# Patient Record
Sex: FEMALE | Race: ASIAN | NOT HISPANIC OR LATINO | Employment: UNEMPLOYED | URBAN - METROPOLITAN AREA
[De-identification: names, ages, dates, MRNs, and addresses within clinical notes are randomized per-mention and may not be internally consistent; named-entity substitution may affect disease eponyms.]

---

## 2017-01-06 ENCOUNTER — COMMUNICATION - HEALTHEAST (OUTPATIENT)
Dept: UROLOGY | Facility: CLINIC | Age: 72
End: 2017-01-06

## 2017-01-16 ENCOUNTER — OFFICE VISIT - HEALTHEAST (OUTPATIENT)
Dept: UROLOGY | Facility: CLINIC | Age: 72
End: 2017-01-16

## 2017-01-16 DIAGNOSIS — N20.9 URINARY CALCULUS, UNSPECIFIED: ICD-10-CM

## 2017-01-16 DIAGNOSIS — R34 LOW URINE OUTPUT: ICD-10-CM

## 2017-06-06 ENCOUNTER — OFFICE VISIT (OUTPATIENT)
Dept: FAMILY MEDICINE | Facility: CLINIC | Age: 72
End: 2017-06-06

## 2017-06-06 VITALS
BODY MASS INDEX: 23.33 KG/M2 | WEIGHT: 100.8 LBS | DIASTOLIC BLOOD PRESSURE: 73 MMHG | OXYGEN SATURATION: 100 % | HEART RATE: 56 BPM | SYSTOLIC BLOOD PRESSURE: 147 MMHG | TEMPERATURE: 97.8 F | HEIGHT: 55 IN

## 2017-06-06 DIAGNOSIS — M15.0 PRIMARY OSTEOARTHRITIS INVOLVING MULTIPLE JOINTS: ICD-10-CM

## 2017-06-06 DIAGNOSIS — E44.1 MILD MALNUTRITION (H): ICD-10-CM

## 2017-06-06 DIAGNOSIS — F33.1 MODERATE EPISODE OF RECURRENT MAJOR DEPRESSIVE DISORDER (H): Primary | ICD-10-CM

## 2017-06-06 DIAGNOSIS — E78.00 HYPERCHOLESTEREMIA: ICD-10-CM

## 2017-06-06 DIAGNOSIS — M81.0 OSTEOPOROSIS: ICD-10-CM

## 2017-06-06 RX ORDER — FEEDER CONTAINER WITH PUMP SET
1 EACH MISCELLANEOUS 2 TIMES DAILY
Qty: 60 CAN | Refills: 11 | Status: SHIPPED | OUTPATIENT
Start: 2017-06-06 | End: 2018-06-19

## 2017-06-06 RX ORDER — SIMVASTATIN 20 MG
20 TABLET ORAL DAILY
COMMUNITY
Start: 2017-06-06 | End: 2017-06-06

## 2017-06-06 RX ORDER — ALENDRONATE SODIUM 70 MG/1
70 TABLET ORAL
COMMUNITY
Start: 2017-06-06 | End: 2017-06-06

## 2017-06-06 RX ORDER — NAPROXEN 500 MG/1
500 TABLET ORAL DAILY PRN
Qty: 30 TABLET | Refills: 1 | Status: SHIPPED | OUTPATIENT
Start: 2017-06-06 | End: 2017-07-31

## 2017-06-06 RX ORDER — ACETAMINOPHEN 500 MG
500 TABLET ORAL EVERY 4 HOURS PRN
Qty: 30 TABLET | Refills: 11 | Status: SHIPPED | OUTPATIENT
Start: 2017-06-06 | End: 2018-07-27

## 2017-06-06 RX ORDER — SIMVASTATIN 20 MG
20 TABLET ORAL DAILY
Qty: 30 TABLET | Refills: 3 | Status: SHIPPED | OUTPATIENT
Start: 2017-06-06 | End: 2017-07-31

## 2017-06-06 RX ORDER — ACETAMINOPHEN 500 MG
500 TABLET ORAL EVERY 4 HOURS PRN
COMMUNITY
Start: 2017-06-06 | End: 2017-06-06

## 2017-06-06 RX ORDER — CAPSAICIN 0.025 %
CREAM (GRAM) TOPICAL DAILY
COMMUNITY
Start: 2017-06-06 | End: 2017-06-06

## 2017-06-06 RX ORDER — CITALOPRAM HYDROBROMIDE 20 MG/1
20 TABLET ORAL DAILY
Qty: 30 TABLET | Refills: 6 | Status: SHIPPED | OUTPATIENT
Start: 2017-06-06 | End: 2017-12-19

## 2017-06-06 RX ORDER — CAPSAICIN 0.025 %
CREAM (GRAM) TOPICAL
Qty: 45 G | Refills: 3 | Status: SHIPPED | OUTPATIENT
Start: 2017-06-06 | End: 2018-02-16

## 2017-06-06 RX ORDER — ALENDRONATE SODIUM 70 MG/1
70 TABLET ORAL
Qty: 4 TABLET | Refills: 2 | Status: SHIPPED | OUTPATIENT
Start: 2017-06-06 | End: 2017-08-31

## 2017-06-06 ASSESSMENT — ANXIETY QUESTIONNAIRES
1. FEELING NERVOUS, ANXIOUS, OR ON EDGE: MORE THAN HALF THE DAYS
2. NOT BEING ABLE TO STOP OR CONTROL WORRYING: NEARLY EVERY DAY
IF YOU CHECKED OFF ANY PROBLEMS ON THIS QUESTIONNAIRE, HOW DIFFICULT HAVE THESE PROBLEMS MADE IT FOR YOU TO DO YOUR WORK, TAKE CARE OF THINGS AT HOME, OR GET ALONG WITH OTHER PEOPLE: VERY DIFFICULT
3. WORRYING TOO MUCH ABOUT DIFFERENT THINGS: NEARLY EVERY DAY
7. FEELING AFRAID AS IF SOMETHING AWFUL MIGHT HAPPEN: NOT AT ALL
5. BEING SO RESTLESS THAT IT IS HARD TO SIT STILL: MORE THAN HALF THE DAYS
6. BECOMING EASILY ANNOYED OR IRRITABLE: MORE THAN HALF THE DAYS
GAD7 TOTAL SCORE: 14

## 2017-06-06 ASSESSMENT — PATIENT HEALTH QUESTIONNAIRE - PHQ9: 5. POOR APPETITE OR OVEREATING: MORE THAN HALF THE DAYS

## 2017-06-06 NOTE — PATIENT INSTRUCTIONS
Ask [facility] about delivering your prescriptions to your adult day center.     Restart depression medication - citalopram    Follow up in 3 weeks    Can take naproxen once daily as needed for joint pain

## 2017-06-06 NOTE — MR AVS SNAPSHOT
After Visit Summary   2017    See You    MRN: [id number]           Patient Information     Date Of Birth          1945        Visit Information        Provider Department      2017 9:40 AM Blanco Moreno MD [facility]        Today's Diagnoses     Mild malnutrition (H)    -  1    Hypercholesteremia        Major depressive disorder, recurrent episode, mild (H)        Primary osteoarthritis involving multiple joints        Osteoporosis          Care Instructions    Ask [facility] about delivering your prescriptions to your adult day center.     Restart depression medication - citalopram    Follow up in 3 weeks    Can take naproxen once daily as needed for joint pain          Follow-ups after your visit        Who to contact     Please call your clinic at [phone] to:    Ask questions about your health    Make or cancel appointments    Discuss your medicines    Learn about your test results    Speak to your doctor   If you have compliments or concerns about an experience at your clinic, or if you wish to file a complaint, please contact [facility] Patient Relations at [phone] or email us at [email or web address]         Additional Information About Your Visit        MyChart Information     [employer] is an electronic gateway that provides easy, online access to your medical records. With [employer], you can request a clinic appointment, read your test results, renew a prescription or communicate with your care team.     To sign up for [employer] visit the website at [email or web address]   You will be asked to enter the access code listed below, as well as some personal information. Please follow the directions to create your username and password.     Your access code is: [email or web address]  Expires: 2017 11:06 AM     Your access code will  in 90 days. If you need help or a new code, please contact your [facility] [place] [facility] or call [phone] for assistance.        Care EveryWhere ID     This is your Care EveryWhere ID. This could be used by other organizations to access your [place] medical records  [id number]        Your Vitals Were     Pulse Temperature Height Pulse Oximetry BMI (Body Mass Index)       56 97.8  F (36.6  C) (Oral) 4' 6.5\" (138.4 cm) 100% 23.86 kg/m2        Blood Pressure from Last 3 Encounters:   06/06/17 147/73   07/17/15 125/70   04/21/15 154/73    Weight from Last 3 Encounters:   06/06/17 100 lb 12.8 oz (45.7 kg)   07/17/15 103 lb 9.6 oz (47 kg)   04/21/15 108 lb (49 kg)              Today, you had the following     No orders found for display         Today's Medication Changes          These changes are accurate as of: 6/6/17 11:07 AM.  If you have any questions, ask your nurse or doctor.               Start taking these medicines.        Dose/Directions    acetaminophen 500 MG tablet   Commonly known as:  TYLENOL   Used for:  Primary osteoarthritis involving multiple joints   Started by:  Blanco Moreno MD        Dose:  500 mg   Take 1 tablet (500 mg) by mouth every 4 hours as needed for pain   Quantity:  30 tablet   Refills:  11       ENSURE HIGH PROTEIN   Used for:  Mild malnutrition (H)   Started by:  Blanco Moreno MD        Dose:  1 Can   Take 1 Can by mouth 2 times daily   Quantity:  60 Can   Refills:  11       naproxen 500 MG tablet   Commonly known as:  NAPROSYN   Used for:  Primary osteoarthritis involving multiple joints   Started by:  Blanco Moreno MD        Dose:  500 mg   Take 1 tablet (500 mg) by mouth daily as needed for moderate pain   Quantity:  30 tablet   Refills:  1       simvastatin 20 MG tablet   Commonly known as:  ZOCOR   Used for:  Hypercholesteremia   Started by:  Blanco Moreno MD        Dose:  20 mg   Take 1 tablet (20 mg) by mouth daily   Quantity:  30 tablet   Refills:  3         These medicines have changed or have " updated prescriptions.        Dose/Directions    capsaicin 0.025 % Crea cream   Commonly known as:  ZOSTRIX   This may have changed:    - how to take this  - when to take this  - additional instructions   Used for:  Primary osteoarthritis involving multiple joints   Changed by:  Blanco Moreno MD        Apply twice daily as needed   Quantity:  45 g   Refills:  3            Where to get your medicines      These medications were sent to [facility] - [place], [place] - [address] [place] [address] [address]  [address], [place] [address]     Phone:  [phone]     acetaminophen 500 MG tablet    alendronate 70 MG tablet    capsaicin 0.025 % Crea cream    citalopram 20 MG tablet    ENSURE HIGH PROTEIN    naproxen 500 MG tablet    simvastatin 20 MG tablet                Primary Care Provider Office Phone # Fax #    Blanco Moreno [phone]       [facility] [address] [facility] [address]        Thank you!     Thank you for choosing [facility]  for your care. Our goal is always to provide you with excellent care. Hearing back from our patients is one way we can continue to improve our services. Please take a few minutes to complete the written survey that you may receive in the mail after your visit with us. Thank you!             Your Updated Medication List - Protect others around you: Learn how to safely use, store and throw away your medicines at www.disposemymeds.org.          This list is accurate as of: 6/6/17 11:07 AM.  Always use your most recent med list.                   Brand Name Dispense Instructions for use    acetaminophen 500 MG tablet    TYLENOL    30 tablet    Take 1 tablet (500 mg) by mouth every 4 hours as needed for pain       alendronate 70 MG tablet    FOSAMAX    4 tablet    Take 1 tablet (70 mg) by mouth every 7 days Take 60 minutes before am meal with 8 oz. water. Remain upright for 30 minutes.       capsaicin 0.025 %  Crea cream    ZOSTRIX    45 g    Apply twice daily as needed       citalopram 20 MG tablet    celeXA    30 tablet    Take 1 tablet (20 mg) by mouth daily       ENSURE HIGH PROTEIN     60 Can    Take 1 Can by mouth 2 times daily       naproxen 500 MG tablet    NAPROSYN    30 tablet    Take 1 tablet (500 mg) by mouth daily as needed for moderate pain       simvastatin 20 MG tablet    ZOCOR    30 tablet    Take 1 tablet (20 mg) by mouth daily
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
## 2017-06-14 NOTE — PROGRESS NOTES
Preceptor Attestation:  Patient's case reviewed and discussed with  Patient seen and discussed with the resident..  I agree with written assessment and plan of care.  Supervising Physician:  Nkechi Olivo MD  [facility]

## 2017-06-15 ASSESSMENT — ANXIETY QUESTIONNAIRES: GAD7 TOTAL SCORE: 14

## 2017-06-15 ASSESSMENT — PATIENT HEALTH QUESTIONNAIRE - PHQ9: SUM OF ALL RESPONSES TO PHQ QUESTIONS 1-9: 17

## 2017-06-22 ENCOUNTER — DOCUMENTATION ONLY (OUTPATIENT)
Dept: FAMILY MEDICINE | Facility: CLINIC | Age: 72
End: 2017-06-22
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
## 2017-06-29 ENCOUNTER — OFFICE VISIT (OUTPATIENT)
Dept: FAMILY MEDICINE | Facility: CLINIC | Age: 72
End: 2017-06-29

## 2017-06-29 VITALS
WEIGHT: 99.4 LBS | HEIGHT: 55 IN | BODY MASS INDEX: 23.01 KG/M2 | OXYGEN SATURATION: 97 % | DIASTOLIC BLOOD PRESSURE: 75 MMHG | SYSTOLIC BLOOD PRESSURE: 131 MMHG | TEMPERATURE: 97.5 F | HEART RATE: 128 BPM

## 2017-06-29 DIAGNOSIS — R73.03 PREDIABETES: ICD-10-CM

## 2017-06-29 DIAGNOSIS — E78.00 HYPERCHOLESTEREMIA: ICD-10-CM

## 2017-06-29 DIAGNOSIS — F33.1 MODERATE EPISODE OF RECURRENT MAJOR DEPRESSIVE DISORDER (H): Primary | ICD-10-CM

## 2017-06-29 LAB
CHOLEST SERPL-MCNC: 196 MG/DL
CHOLEST/HDLC SERPL: 3 RATIO
HBA1C MFR BLD: 6.1 % (ref 4.1–5.7)
HDLC SERPL-MCNC: 66 MG/DL
LDLC SERPL CALC-MCNC: 79 MG/DL (ref 0–99)
TRIGL SERPL-MCNC: 255 MG/DL
VLDL-CHOLESTEROL: 51 MG/DL (ref 7–32)

## 2017-06-29 RX ORDER — BUPROPION HYDROCHLORIDE 150 MG/1
150 TABLET ORAL EVERY MORNING
Qty: 30 TABLET | Refills: 1 | Status: SHIPPED | OUTPATIENT
Start: 2017-06-29 | End: 2017-08-31

## 2017-06-29 NOTE — PROGRESS NOTES
Preceptor Attestation:  Patient's case reviewed and discussed with Blanco Moreno MD Patient seen and discussed with the resident.. I agree with assessment and plan of care.  Supervising Physician:  Mariajose Gayle DO  [facility]

## 2017-06-29 NOTE — PROGRESS NOTES
"[facility] Progress note: June 29, 2017       HPI:       See You is a 71 year old female with PMH of depression, HLD, osteoporosis who presents for:     Depression F/U:   - restarted citalopram 20 mg 3 weeks ago (recently moved back to [place] from [place])  - continues to feel significant depressed mood  - feels alone/ignored, isolated from family  - states her relationships with her kids are \"strained\"  - Going to adult day program 2 days a week, will increase to 4 days a week soon, finds this helpful  - plans to establish with her previous therapist  - no SI/HI    Wants to be checked for diabetes. Has family history of this.     Plans to fill out and advanced directive with her son. Plans to be DNR/DNI.          PMHX:     Patient Active Problem List   Diagnosis     Major depression     Esophageal reflux     Generalized osteoarthrosis, unspecified site     Hyperlipidemia     Osteoporosis       Current Outpatient Prescriptions   Medication Sig Dispense Refill     citalopram (CELEXA) 20 MG tablet Take 1 tablet (20 mg) by mouth daily 30 tablet 6     Nutritional Supplements (ENSURE HIGH PROTEIN) Take 1 Can by mouth 2 times daily 60 Can 11     acetaminophen (TYLENOL) 500 MG tablet Take 1 tablet (500 mg) by mouth every 4 hours as needed for pain 30 tablet 11     alendronate (FOSAMAX) 70 MG tablet Take 1 tablet (70 mg) by mouth every 7 days Take 60 minutes before am meal with 8 oz. water. Remain upright for 30 minutes. 4 tablet 2     capsaicin (ZOSTRIX) 0.025 % CREA cream Apply twice daily as needed 45 g 3     simvastatin (ZOCOR) 20 MG tablet Take 1 tablet (20 mg) by mouth daily 30 tablet 3     naproxen (NAPROSYN) 500 MG tablet Take 1 tablet (500 mg) by mouth daily as needed for moderate pain 30 tablet 1          Allergies   Allergen Reactions     Nka [No Known Allergies]             Review of Systems:   Complete ROS negative other than above          Physical Exam:     Vitals:    06/29/17 0956   BP: 131/75 " "  Pulse: 128   Temp: 97.5  F (36.4  C)   TempSrc: Oral   SpO2: 97%   Weight: 99 lb 6.4 oz (45.1 kg)   Height: 4' 7\" (139.7 cm)     Body mass index is 23.1 kg/(m^2).    Gen: AOx3, NAD  HEENT: PERRL, EOMI, no icterus, MMM  Neck: no LAD  Lungs: CTAB, no wheezing or crackles  CV: RRR, no murmurs/rubs/gallops  ABD: Soft, NT, ND, no masses, BS+  Extrem: warm with pulses, no edema  Skin: no rash or lesions visible  Neuro: grossly intact, no focal deficits  Psych: depressed affect, tearful at times    Results for orders placed or performed in visit on 06/29/17   Hemoglobin A1c ([facility] )   Result Value Ref Range    Hemoglobin A1C 6.1 (H) 4.1 - 5.7 %   Lipid Panel ([facility]) - Results < 1 hr   Result Value Ref Range    Cholesterol 196.0 <200.0 mg/dL    Triglycerides 255.0 (H) <150.0 mg/dL    HDL Cholesterol 66.0 >50.0 mg/dL    VLDL-Cholesterol 51.0 (H) 7.0 - 32.0 mg/dL    LDL Cholesterol Direct 79.0 0.0 - 99.0 mg/dL    Cholesterol/HDL Ratio 3.0 <5.0 RATIO         Assessment and Plan     Major depressive disorder, recurrent episode, moderate: primarily related to feelings of isolation from her family. At last visit restarted citalopram, she has not restarted therapy yet. Adult day-program has been helpful.   - citalopram 20 mg daily, max dose for geriatric patients  - start bupropion  mg daily as adjunct to SSRI  - restart therapy with previous provider  - continue Adult day program    New prediabetes: A1c of 6.1%. Has family history of diabetes.   - Will plan to discuss lifestyle modification at follow up  - recheck A1c in 6 months, consider starting metformin if increasing    HLD: lipids checked. 10 yr ASCVD risk 8.5%.   - continue simvastatin 20 mg daily     Follow up in 4 weeks     Blanco Moreno MD PGY3  [facility] Medicine Residency      Precepted today with: Dr. Gayle    "

## 2017-06-29 NOTE — MR AVS SNAPSHOT
After Visit Summary   2017    See You    MRN: [id number]           Patient Information     Date Of Birth          1945        Visit Information        Provider Department      2017 9:40 AM Blanco Moreno MD [facility]        Today's Diagnoses     Dry eyes    -  1    Hypercholesteremia        Screening for diabetes mellitus        Moderate episode of recurrent major depressive disorder (H)          Care Instructions    Start bupropion 150 mg daily to help with depression    Try lubricating eye drops twice daily           Follow-ups after your visit        Who to contact     Please call your clinic at [phone] to:    Ask questions about your health    Make or cancel appointments    Discuss your medicines    Learn about your test results    Speak to your doctor   If you have compliments or concerns about an experience at your clinic, or if you wish to file a complaint, please contact [facility] Patient Relations at [phone] or email us at [email or web address]         Additional Information About Your Visit        MyChart Information     [employer] is an electronic gateway that provides easy, online access to your medical records. With Promote[employer], you can request a clinic appointment, read your test results, renew a prescription or communicate with your care team.     To sign up for [employer] visit the website at [email or web address]   You will be asked to enter the access code listed below, as well as some personal information. Please follow the directions to create your username and password.     Your access code is: [email or web address]  Expires: 2017 11:06 AM     Your access code will  in 90 days. If you need help or a new code, please contact your [facility] or call [phone] for assistance.        Care EveryWhere ID     This is your Care EveryWhere ID. This could be used by other  "organizations to access your [facility] medical records  [id number]        Your Vitals Were     Pulse Temperature Height Pulse Oximetry BMI (Body Mass Index)       128 97.5  F (36.4  C) (Oral) 4' 7\" (139.7 cm) 97% 23.1 kg/m2        Blood Pressure from Last 3 Encounters:   06/29/17 131/75   06/06/17 147/73   07/17/15 125/70    Weight from Last 3 Encounters:   06/29/17 99 lb 6.4 oz (45.1 kg)   06/06/17 100 lb 12.8 oz (45.7 kg)   07/17/15 103 lb 9.6 oz (47 kg)              We Performed the Following     Hemoglobin A1c ([facility])     Lipid Panel ([facility]) - Results < 1 hr          Today's Medication Changes          These changes are accurate as of: 6/29/17 10:53 AM.  If you have any questions, ask your nurse or doctor.               Start taking these medicines.        Dose/Directions    buPROPion 150 MG 24 hr tablet   Commonly known as:  WELLBUTRIN XL   Used for:  Moderate episode of recurrent major depressive disorder (H)   Started by:  Blanco Moreno MD        Dose:  150 mg   Take 1 tablet (150 mg) by mouth every morning   Quantity:  30 tablet   Refills:  1       glycerin-hypromellose- 0.2-0.2-1 % Soln ophthalmic solution   Commonly known as:  ARTIFICIAL TEARS   Used for:  Dry eyes   Started by:  Blanco Moreno MD        Dose:  1 drop   Place 1 drop into both eyes 2 times daily   Quantity:  1 Bottle   Refills:  3            Where to get your medicines      These medications were sent to [facility] - [place], [place] - [address]  [address], [place] [address]     Phone:  [phone]     buPROPion 150 MG 24 hr tablet    glycerin-hypromellose- 0.2-0.2-1 % Soln ophthalmic solution                Primary Care Provider Office Phone # Fax #    Blanco Moreno [phone]       [facility] [address] [place] [address]        Equal Access to Services     BANDAR MENDOZA AH: nimesh Gomes, " guido childs tristongrover mcghee ah. So [facility] [phone].    ATENCIÓN: Si cody mast, tiene a bedoya disposición servicios gratuitos de asistencia lingüística. Sonja al [phone].    We comply with applicable federal civil rights laws and [place] laws. We do not discriminate on the basis of race, color, national origin, age, disability sex, sexual orientation or gender identity.            Thank you!     Thank you for choosing [facility]  for your care. Our goal is always to provide you with excellent care. Hearing back from our patients is one way we can continue to improve our services. Please take a few minutes to complete the written survey that you may receive in the mail after your visit with us. Thank you!             Your Updated Medication List - Protect others around you: Learn how to safely use, store and throw away your medicines at www.disposemymeds.org.          This list is accurate as of: 6/29/17 10:53 AM.  Always use your most recent med list.                   Brand Name Dispense Instructions for use Diagnosis    acetaminophen 500 MG tablet    TYLENOL    30 tablet    Take 1 tablet (500 mg) by mouth every 4 hours as needed for pain    Primary osteoarthritis involving multiple joints       alendronate 70 MG tablet    FOSAMAX    4 tablet    Take 1 tablet (70 mg) by mouth every 7 days Take 60 minutes before am meal with 8 oz. water. Remain upright for 30 minutes.    Osteoporosis       buPROPion 150 MG 24 hr tablet    WELLBUTRIN XL    30 tablet    Take 1 tablet (150 mg) by mouth every morning    Moderate episode of recurrent major depressive disorder (H)       capsaicin 0.025 % Crea cream    ZOSTRIX    45 g    Apply twice daily as needed    Primary osteoarthritis involving multiple joints       citalopram 20 MG tablet    celeXA    30 tablet    Take 1 tablet (20 mg) by mouth daily        ENSURE HIGH PROTEIN     60 Can    Take 1 Can by mouth 2 times daily     Mild malnutrition (H)       glycerin-hypromellose- 0.2-0.2-1 % Soln ophthalmic solution    ARTIFICIAL TEARS    1 Bottle    Place 1 drop into both eyes 2 times daily    Dry eyes       naproxen 500 MG tablet    NAPROSYN    30 tablet    Take 1 tablet (500 mg) by mouth daily as needed for moderate pain    Primary osteoarthritis involving multiple joints       simvastatin 20 MG tablet    ZOCOR    30 tablet    Take 1 tablet (20 mg) by mouth daily    Hypercholesteremia

## 2017-06-29 NOTE — PATIENT INSTRUCTIONS
Start bupropion 150 mg daily to help with depression  - this can cause headaches or sleep issues, let us know if this happens    Try lubricating eye drops twice daily

## 2017-07-02 PROBLEM — R73.03 PREDIABETES: Status: ACTIVE | Noted: 2017-07-02

## 2017-07-31 ENCOUNTER — OFFICE VISIT (OUTPATIENT)
Dept: FAMILY MEDICINE | Facility: CLINIC | Age: 72
End: 2017-07-31

## 2017-07-31 VITALS
TEMPERATURE: 98.7 F | WEIGHT: 98.6 LBS | HEIGHT: 55 IN | OXYGEN SATURATION: 97 % | BODY MASS INDEX: 22.82 KG/M2 | SYSTOLIC BLOOD PRESSURE: 130 MMHG | HEART RATE: 67 BPM | DIASTOLIC BLOOD PRESSURE: 76 MMHG

## 2017-07-31 DIAGNOSIS — F33.1 MODERATE EPISODE OF RECURRENT MAJOR DEPRESSIVE DISORDER (H): ICD-10-CM

## 2017-07-31 DIAGNOSIS — M15.0 PRIMARY OSTEOARTHRITIS INVOLVING MULTIPLE JOINTS: ICD-10-CM

## 2017-07-31 DIAGNOSIS — Z71.89 ADVANCE CARE PLANNING: ICD-10-CM

## 2017-07-31 DIAGNOSIS — Z91.81 AT MODERATE RISK FOR FALL: ICD-10-CM

## 2017-07-31 DIAGNOSIS — E78.00 HYPERCHOLESTEREMIA: ICD-10-CM

## 2017-07-31 DIAGNOSIS — H04.123 DRY EYES: Primary | ICD-10-CM

## 2017-07-31 RX ORDER — NAPROXEN 500 MG/1
500 TABLET ORAL DAILY PRN
Qty: 30 TABLET | Refills: 1 | Status: SHIPPED | OUTPATIENT
Start: 2017-07-31 | End: 2018-02-16

## 2017-07-31 RX ORDER — SIMVASTATIN 20 MG
20 TABLET ORAL DAILY
Qty: 30 TABLET | Refills: 3 | Status: SHIPPED | OUTPATIENT
Start: 2017-07-31 | End: 2018-07-27

## 2017-07-31 NOTE — PROGRESS NOTES
HPI:   Neil Orta is a 71 year old  female who presents to clinic today for follow-up of her depression, refills, and request to fill out advanced care directive.     She states she feels sad, but ok. PHQ-9 is 16 today. She does not have problems with sleep, SI, or appetite; however, she does feel depressed and little energy as well as labile mood. Mainly feels guilty and sad that she does not have a place to live of her own. She moved here from [place] because she does not have a good relationship with her kids and other family living there. She is currently living with a nephew of another relative.  She would like to make him her medical decision maker. She also attends adult day care 4 days a week.    She takes all of her medications without missing doses including her citalopram and Wellbutrin. She feels both of these medications are helping. She has not as of yet set up an appointment with her previous therapist. She states she will call to do so.    She has applied for public housing a few months back. She states all the paperwork is in, but she is waiting for an opening.    Additionally, she is working with [employer] to obtain a cane and bath chair. She states she feels unsteady and very weak at times and is worried she may fall, especially in the shower/bath. She states she does not currently need any further help obtaining these items, but will address me if needed.    She has no other concerns today. She denies fever, chills, chest pain, or shortness of breath.    Patient is an established patient of this clinic.    A [employer]  was used for this visit         PMHX:   Active Problems List  Patient Active Problem List   Diagnosis     Major depression     Esophageal reflux     Generalized osteoarthrosis, unspecified site     Hyperlipidemia     Osteoporosis     Prediabetes     Active problem list reviewed and updated.    Current Medications  Current Outpatient Prescriptions   Medication Sig Dispense  "Refill     naproxen (NAPROSYN) 500 MG tablet Take 1 tablet (500 mg) by mouth daily as needed for moderate pain 30 tablet 1     glycerin-hypromellose- (ARTIFICIAL TEARS) 0.2-0.2-1 % SOLN ophthalmic solution Place 1 drop into both eyes 2 times daily 1 Bottle 3     simvastatin (ZOCOR) 20 MG tablet Take 1 tablet (20 mg) by mouth daily 30 tablet 3     buPROPion (WELLBUTRIN XL) 150 MG 24 hr tablet Take 1 tablet (150 mg) by mouth every morning 30 tablet 1     citalopram (CELEXA) 20 MG tablet Take 1 tablet (20 mg) by mouth daily 30 tablet 6     Nutritional Supplements (ENSURE HIGH PROTEIN) Take 1 Can by mouth 2 times daily 60 Can 11     acetaminophen (TYLENOL) 500 MG tablet Take 1 tablet (500 mg) by mouth every 4 hours as needed for pain 30 tablet 11     alendronate (FOSAMAX) 70 MG tablet Take 1 tablet (70 mg) by mouth every 7 days Take 60 minutes before am meal with 8 oz. water. Remain upright for 30 minutes. 4 tablet 2     capsaicin (ZOSTRIX) 0.025 % CREA cream Apply twice daily as needed 45 g 3     [DISCONTINUED] simvastatin (ZOCOR) 20 MG tablet Take 1 tablet (20 mg) by mouth daily 30 tablet 3     Medication list reviewed and updated.    Social History  - Lives in a house with nephew of another family member  - Recently returned from move to [place]. Now living here because she is estranged from her other family members.  - She attends adult  4 days a week.  Social History   Substance Use Topics     Smoking status: Never Smoker     Smokeless tobacco: Not on file     Alcohol use No     History   Drug Use No     Allergies  Allergies   Allergen Reactions     Nka [No Known Allergies]      Allergies and Medication Intolerances Updated         Physical Exam:     Vitals:    07/31/17 1318   BP: 130/76   Pulse: 67   Temp: 98.7  F (37.1  C)   TempSrc: Oral   SpO2: 97%   Weight: 98 lb 9.6 oz (44.7 kg)   Height: 4' 6.5\" (138.4 cm)     Body mass index is 23.34 kg/(m^2).    General: Appears well and in no acute " "distress.  HEENT: Eyes grossly normal to inspection. Extraocular movements intact. Pupils equal, round, and reactive to light. Mucous membranes moist. No ulcers or lesions noted in the oropharynx.  Cardiovascular:  Regular rate and rhythm, normal S1 and S2 without murmur. No extra heartsounds or friction rub. Radial pulses present and equal bilaterally.  Respiratory: Lungs clear to auscultation bilaterally. No wheezing or crackles. No prolonged expiration. Symmetrical chest rise.  Psych: Depressed/\"sad but ok\" mood. Tearful at times. Alert and oriented to person, place, and time. Able to articulate logical thoughts. Affect matches mood.    Assessment and Plan   1. Primary osteoarthritis involving multiple joints: Refilled medication.   - naproxen (NAPROSYN) 500 MG tablet; Take 1 tablet (500 mg) by mouth daily as needed for moderate pain  Dispense: 30 tablet; Refill: 1      2. Risk for Falls: Does not live alone. Patient has difficulty with walking and sometimes looses balance in the shower. Working on getting a cane and bath chair which I recommend to prevent falls or significant harm that would otherwise increase her risk of morbidity and mortality.  - Obtain cane and bath chair.    3. Dry eyes: Refilled meds as requested.  - glycerin-hypromellose- (ARTIFICIAL TEARS) 0.2-0.2-1 % SOLN ophthalmic solution; Place 1 drop into both eyes 2 times daily  Dispense: 1 Bottle; Refill: 3    4. Hypercholesteremia: Last lipid panel 6/29/17. On Simvastain. Refilled medication as requested.  - simvastatin (ZOCOR) 20 MG tablet; Take 1 tablet (20 mg) by mouth daily  Dispense: 30 tablet; Refill: 3    5. Moderate episode of recurrent major depressive disorder (H): PHQ9 of 16 today. No SI or plan though. Currently depressed/sad mood and tearful mainly regarding lack of having her own place. On wait list for public housing. Thinks both her Wellbutrin and Citalopram are helping at current dose and no noticeable side effects. " Patient reports not having set up her therapist appointment yet with her old therapist. Recommend f/u in a month to check in, if still failing to follow-up recommend mental health referral placed with our clinic to have them help schedule it.   - 1 month follow-up   - Establish appointment with therapist, referral if not made by next appointment mental health referral for out clinic should be given  - No medication changes today. On max dose of citalopram 20 mg for Geriatrics. Continue Wellbutrin 150 mg XL.    6. Advanced Care Directive Planning:  Would like to fill out advanced irective to assign distant relative (nephew of another family member) as her medical decision maker. Currently living with relative. [place] met with patient to go over it today, but patient will take it home to discuss with family where she says they can help her read it and fill it out.  - Fill out advanced care directive and return to clinic at next appointment, to be filled out with family.  - Review at next appointment.    Options for treatment and follow-up care were reviewed with the patient and/or guardian. See You and/or guardian engaged in the decision making process and verbalized understanding of the options discussed and agreed with the final plan.    Luis Alberto Maya MD  [facility] Medicine Resident  Pager# [phone]    Precepted with: Dr. Lyndon Cash      This chart is completed utilizing dictation software; typos and/or incorrect word substitutions may unintentionally occur.

## 2017-07-31 NOTE — MR AVS SNAPSHOT
After Visit Summary   2017    See You    MRN: [id number]           Patient Information     Date Of Birth          1945        Visit Information        Provider Department      2017 1:20 PM Luis Alberto Maya MD [facility]         Follow-ups after your visit        Your next 10 appointments already scheduled     2017  1:20 PM CDT   Return Visit with Luis Alberto Maya MD   [facility] ([facility])    [address] [place] [address]   [phone]              Who to contact     Please call your clinic at [phone] to:    Ask questions about your health    Make or cancel appointments    Discuss your medicines    Learn about your test results    Speak to your doctor   If you have compliments or concerns about an experience at your clinic, or if you wish to file a complaint, please contact [facility] Patient Relations at [phone] or email us at [email or web address]         Additional Information About Your Visit        MyChart Information     [employer] is an electronic gateway that provides easy, online access to your medical records. With [employer], you can request a clinic appointment, read your test results, renew a prescription or communicate with your care team.     To sign up for [employer] visit the website at [email or web address]   You will be asked to enter the access code listed below, as well as some personal information. Please follow the directions to create your username and password.     Your access code is: [email or web address]  Expires: 2017 11:06 AM     Your access code will  in 90 days. If you need help or a new code, please contact your [facility] or call [phone] for assistance.        Care EveryWhere ID     This is your Care EveryWhere ID. This could be used by other organizations to access your [facility]  records  [id number]         Blood Pressure from Last 3 Encounters:   06/29/17 131/75   06/06/17 147/73   07/17/15 125/70    Weight from Last 3 Encounters:   06/29/17 99 lb 6.4 oz (45.1 kg)   06/06/17 100 lb 12.8 oz (45.7 kg)   07/17/15 103 lb 9.6 oz (47 kg)              Today, you had the following     No orders found for display       Primary Care Provider Office Phone # Fax #    Luis Alberto Maya [phone]       [facility] [address] [place] [address]        Equal Access to Services     BANDAR MENDOZA : Hadii cierra vyas hadasho Sosylvia, waaxda luqadaha, qaybta kaalmada adeegyada, grover small . So [facility] [phone].    ATENCIÓN: Si habla español, tiene a bedoya disposición servicios gratuitos de asistencia lingüística. Llame al [phone].    We comply with applicable federal civil rights laws and [place] laws. We do not discriminate on the basis of race, color, national origin, age, disability sex, sexual orientation or gender identity.            Thank you!     Thank you for choosing [facility]  for your care. Our goal is always to provide you with excellent care. Hearing back from our patients is one way we can continue to improve our services. Please take a few minutes to complete the written survey that you may receive in the mail after your visit with us. Thank you!             Your Updated Medication List - Protect others around you: Learn how to safely use, store and throw away your medicines at www.disposemymeds.org.          This list is accurate as of: 7/31/17  1:15 PM.  Always use your most recent med list.                   Brand Name Dispense Instructions for use Diagnosis    acetaminophen 500 MG tablet    TYLENOL    30 tablet    Take 1 tablet (500 mg) by mouth every 4 hours as needed for pain    Primary osteoarthritis involving multiple joints       alendronate 70 MG tablet    FOSAMAX    4 tablet    Take 1 tablet (70 mg) by  mouth every 7 days Take 60 minutes before am meal with 8 oz. water. Remain upright for 30 minutes.    Osteoporosis       buPROPion 150 MG 24 hr tablet    WELLBUTRIN XL    30 tablet    Take 1 tablet (150 mg) by mouth every morning    Moderate episode of recurrent major depressive disorder (H)       capsaicin 0.025 % Crea cream    ZOSTRIX    45 g    Apply twice daily as needed    Primary osteoarthritis involving multiple joints       citalopram 20 MG tablet    celeXA    30 tablet    Take 1 tablet (20 mg) by mouth daily        ENSURE HIGH PROTEIN     60 Can    Take 1 Can by mouth 2 times daily    Mild malnutrition (H)       glycerin-hypromellose- 0.2-0.2-1 % Soln ophthalmic solution    ARTIFICIAL TEARS    1 Bottle    Place 1 drop into both eyes 2 times daily        naproxen 500 MG tablet    NAPROSYN    30 tablet    Take 1 tablet (500 mg) by mouth daily as needed for moderate pain    Primary osteoarthritis involving multiple joints       simvastatin 20 MG tablet    ZOCOR    30 tablet    Take 1 tablet (20 mg) by mouth daily    Hypercholesteremia

## 2017-07-31 NOTE — PROGRESS NOTES
asked me to talk to the pt about doing a Advance Directive.  The pt stated that she wanted to talk to her son, and sister about it first. I told her to talk to them, and when she is ready to do one, then I can help her.

## 2017-07-31 NOTE — PROGRESS NOTES
Preceptor Attestation:  Patient's case reviewed and discussed with Luis Alberto Maya MD Patient seen and discussed with the resident.. I agree with assessment and plan of care.  Supervising Physician:  Lyndon Cash MD  [facility]

## 2017-07-31 NOTE — NURSING NOTE
name: Lis Manley-Her  Language: HMong  Agency: [employer]  Phone number: [phone]    PHQ9--given to pt to have  help fill out  Mammogram--pt decline.

## 2017-07-31 NOTE — PATIENT INSTRUCTIONS
Important Takeaway Points From This Visit:    Please fill out your after advanced care directive and bring it back to clinic for your next appointment.    I have refilled your medications.    Please schedule an appoint with me again in 1 month      As always, please call with any questions or concerns. I look forward to seeing you again soon!    Take care,  Dr. Maya    Your current medication list is printed. Please keep this with you - it is helpful to bring this current list to any other medical appointments. It can also be helpful if you ever go to the emergency room or hospital.    If you had lab testing today we will call you with the results. The phone number we will call with your results is # [phone] (home) . If this is not the best number please call our clinic and change the number.    If you need any refills, please call your pharmacy and they will contact us.    If you have any further concerns or wish to schedule another appointment, please call our office at [phone] during normal business hours (8-5, M-F).    If you have urgent medical questions that cannot wait, you may call [phone] at any time of day.    If you have a medical emergency, please call [phone].    Thank you for coming to [facility].

## 2017-08-01 ASSESSMENT — PATIENT HEALTH QUESTIONNAIRE - PHQ9: SUM OF ALL RESPONSES TO PHQ QUESTIONS 1-9: 16

## 2017-08-29 ENCOUNTER — OFFICE VISIT (OUTPATIENT)
Dept: FAMILY MEDICINE | Facility: CLINIC | Age: 72
End: 2017-08-29

## 2017-08-29 VITALS
TEMPERATURE: 97.9 F | HEART RATE: 59 BPM | OXYGEN SATURATION: 98 % | HEIGHT: 55 IN | WEIGHT: 99.8 LBS | SYSTOLIC BLOOD PRESSURE: 104 MMHG | DIASTOLIC BLOOD PRESSURE: 65 MMHG | BODY MASS INDEX: 23.1 KG/M2 | RESPIRATION RATE: 18 BRPM

## 2017-08-29 DIAGNOSIS — F33.1 MODERATE EPISODE OF RECURRENT MAJOR DEPRESSIVE DISORDER (H): Primary | ICD-10-CM

## 2017-08-29 DIAGNOSIS — Z71.89 ADVANCED DIRECTIVES, COUNSELING/DISCUSSION: ICD-10-CM

## 2017-08-29 DIAGNOSIS — K21.9 GASTROESOPHAGEAL REFLUX DISEASE, ESOPHAGITIS PRESENCE NOT SPECIFIED: ICD-10-CM

## 2017-08-29 RX ORDER — NICOTINE POLACRILEX 4 MG/1
20 GUM, CHEWING ORAL DAILY
Qty: 30 TABLET | Refills: 4 | Status: SHIPPED | OUTPATIENT
Start: 2017-08-29 | End: 2017-08-29 | Stop reason: ALTCHOICE

## 2017-08-29 ASSESSMENT — PATIENT HEALTH QUESTIONNAIRE - PHQ9: SUM OF ALL RESPONSES TO PHQ QUESTIONS 1-9: 9

## 2017-08-29 NOTE — PROGRESS NOTES
HPI:   See You is a 71 year old  female who presents to clinic today for f/u of her depression. She states she feels she is doing better. She has set up appointments with prior therapist and had one yesterday she thinks went very well. Her next appointment is September 29th. She denies SI. Sleeping ok. No side effects form Wellbutrin.    PHQ9 - 9 today, which is a clinically significant decrease from 16 at last visit on 7/31/17.    Would also like nexium or omeprazole. She has been on omeprazole in the past, but not since leaving for [place] years ago. Her friend gave her Nexium that has been helping with discomfort she has been having. She has been having abdominal pain with eating hard/chewy foods such as meat. This does not change with position, but she does not lie down frequently after meals. It is not made worse with activity and only ever comes on after eating foods. She cannot describe the pain much more than this other than being irritating. It is not a ache, stabbing, pressure, or burning type of pain. She does not feel that food is getting stuck in her throat or chest. tends to upset stomach like meat. No nausea, vomiting, diarrhea, fever, chills, or bloody or tarry stools.    I also helped this patient finish filling out advanced directive and this was signed by ana. 5 Copies given, one to be scanned in, one for her, and multiple to give to family members.    Now living with son.    Patient is an established patient of this clinic.    A beSUCCESS  was used for this visit         PMHX:   Active Problems List  Patient Active Problem List   Diagnosis     Major depression     Esophageal reflux     Generalized osteoarthrosis, unspecified site     Hyperlipidemia     Osteoporosis     Prediabetes     Active problem list reviewed and updated.    Current Medications  Current Outpatient Prescriptions   Medication Sig Dispense Refill     naproxen (NAPROSYN) 500 MG tablet Take 1 tablet (500 mg) by  mouth daily as needed for moderate pain 30 tablet 1     glycerin-hypromellose- (ARTIFICIAL TEARS) 0.2-0.2-1 % SOLN ophthalmic solution Place 1 drop into both eyes 2 times daily 1 Bottle 3     simvastatin (ZOCOR) 20 MG tablet Take 1 tablet (20 mg) by mouth daily 30 tablet 3     buPROPion (WELLBUTRIN XL) 150 MG 24 hr tablet Take 1 tablet (150 mg) by mouth every morning 30 tablet 1     citalopram (CELEXA) 20 MG tablet Take 1 tablet (20 mg) by mouth daily 30 tablet 6     Nutritional Supplements (ENSURE HIGH PROTEIN) Take 1 Can by mouth 2 times daily 60 Can 11     acetaminophen (TYLENOL) 500 MG tablet Take 1 tablet (500 mg) by mouth every 4 hours as needed for pain 30 tablet 11     alendronate (FOSAMAX) 70 MG tablet Take 1 tablet (70 mg) by mouth every 7 days Take 60 minutes before am meal with 8 oz. water. Remain upright for 30 minutes. 4 tablet 2     capsaicin (ZOSTRIX) 0.025 % CREA cream Apply twice daily as needed 45 g 3     Medication list reviewed and updated.    Family History  Family History   Problem Relation Age of Onset     DIABETES No family hx of      Coronary Artery Disease No family hx of      Breast Cancer No family hx of      Cancer - colorectal No family hx of      Ovarian Cancer No family hx of      Prostate Cancer No family hx of      Hypertension No family hx of      Other Cancer No family hx of      Mental Illness No family hx of      CEREBROVASCULAR DISEASE No family hx of      Anesthesia Reaction No family hx of      Asthma No family hx of      OSTEOPOROSIS No family hx of      Known Genetic Syndrome No family hx of      Obesity No family hx of      Unknown/Adopted No family hx of        Family history reviewed and updated.  Social History  - Lives with son now. Most of her family is still in [place].  Social History   Substance Use Topics     Smoking status: Never Smoker     Smokeless tobacco: Not on file     Alcohol use No     History   Drug Use No     Allergies  Allergies   Allergen  "Reactions     Nka [No Known Allergies]      Allergies and Medication Intolerances Updated         Physical Exam:     Vitals:    08/29/17 1101   BP: 104/65   Pulse: 59   Resp: 18   Temp: 97.9  F (36.6  C)   SpO2: 98%   Weight: 99 lb 12.8 oz (45.3 kg)   Height: 4' 6.5\" (138.4 cm)     Body mass index is 23.62 kg/(m^2).    General: Appears well and in no acute distress.  HEENT: Eyes grossly normal to inspection. Extraocular movements intact. Pupils equal, round, and reactive to light. Mucous membranes moist. No ulcers or lesions noted in the oropharynx.  Cardiovascular: Regular rate and rhythm, normal S1 and S2 without murmur. No extra heartsounds or friction rub. Radial pulses present and equal bilaterally.  Respiratory: Lungs clear to auscultation bilaterally. No wheezing or crackles.  GI/Rectal: Soft, non-tender abdomen. No hepatosplenomegaly. Normal active bowel sounds.    Assessment and Plan   1. Gastroesophageal reflux disease, esophagitis presence not specified: Likely GERD given description of occurring with food and relieved with PPI. No concerning symptoms otherwise for ACS or bleeding ulcer. Would prefer use of H2 blocker if possible rather than long term PPI. Will trial Zantac for 1 month and f/u.  - ranitidine (ZANTAC) 150 MG tablet; Take 1 tablet (150 mg) by mouth 2 times daily  Dispense: 60 tablet; Refill: 1  - F/u 1 month PRN    2. Moderate episode of recurrent major depressive disorder (H): Established care with Therapist. Living with son now. No side effects from Wellbutrin and mood improving. Will continue current care.   - Wellbutrin 150 mg XL Daily    3. Advanced directives, counseling/discussion: As above. Mostly completed at home, but helped patient fill out the rest with help of  for parts she had questions on. Patient and I signed form with notary witness who also stamped and signed the form. This is to be scanned into chart. Copies given to patient for herself and family.    Options " for treatment and follow-up care were reviewed with the patient and/or guardian. See You and/or guardian engaged in the decision making process and verbalized understanding of the options discussed and agreed with the final plan.    Luis Alberto Maya MD  [facility] Resident  Pager# [phone]    Precepted with: Lynn Olivo MD      This chart is completed utilizing dictation software; typos and/or incorrect word substitutions may unintentionally occur.

## 2017-08-29 NOTE — MR AVS SNAPSHOT
After Visit Summary   8/29/2017    See You    MRN: [id number]           Patient Information     Date Of Birth          1945        Visit Information        Provider Department      8/29/2017 11:00 AM Luis Alberto Maya MD [facility]        Today's Diagnoses     Moderate episode of recurrent major depressive disorder (H)    -  1    Gastroesophageal reflux disease, esophagitis presence not specified        Advanced directives, counseling/discussion          Care Instructions    Important Takeaway Points From This Visit:    Please try this new medication, Zantac twice a day for a month. This medication is safer than Omeprazole or Nexium.    If your symptoms aren't better in a month please come back to the clinic to let us know    Please give your extra copies of your advanced directive is given to family members      As always, please call with any questions or concerns. I look forward to seeing you again soon!    Take care,  Dr. Maya    Your current medication list is printed. Please keep this with you - it is helpful to bring this current list to any other medical appointments. It can also be helpful if you ever go to the emergency room or hospital.    If you had lab testing today we will call you with the results. The phone number we will call with your results is # [phone] (home) . If this is not the best number please call our clinic and change the number.    If you need any refills, please call your pharmacy and they will contact us.    If you have any further concerns or wish to schedule another appointment, please call our office at [phone] during normal business hours (8-5, M-F).    If you have urgent medical questions that cannot wait, you may call [phone] at any time of day.    If you have a medical emergency, please call [phone].    Thank you for coming to [facility].              Follow-ups after your visit        Your next 10 appointments  "already scheduled     Sep 22, 2017  1:20 PM CDT   Return Visit with Luis Alberto Maya MD   [facility] ([facility])    [address] [place] [address]   [phone]              Who to contact     Please call your clinic at [phone] to:    Ask questions about your health    Make or cancel appointments    Discuss your medicines    Learn about your test results    Speak to your doctor   If you have compliments or concerns about an experience at your clinic, or if you wish to file a complaint, please contact [facility] Patient Relations at [phone] or email us at [email or web address]         Additional Information About Your Visit        Biotixhart Information     PeakÂ® is an electronic gateway that provides easy, online access to your medical records. With PeakÂ®, you can request a clinic appointment, read your test results, renew a prescription or communicate with your care team.     To sign up for PeakÂ® visit the website at [email or web address]   You will be asked to enter the access code listed below, as well as some personal information. Please follow the directions to create your username and password.     Your access code is: [email or web address]  Expires: 12/10/2017  8:07 AM     Your access code will  in 90 days. If you need help or a new code, please contact your [facility] or call [phone] for assistance.        Care EveryWhere ID     This is your Care EveryWhere ID. This could be used by other organizations to access your [place] medical records  [id number]        Your Vitals Were     Pulse Temperature Respirations Height Pulse Oximetry BMI (Body Mass Index)    59 97.9  F (36.6  C) 18 4' 6.5\" (138.4 cm) 98% 23.62 kg/m2       Blood Pressure from Last 3 Encounters:   17 104/65   17 130/76   17 131/75    Weight from Last 3 Encounters:   17 99 lb 12.8 oz (45.3 kg) "   07/31/17 98 lb 9.6 oz (44.7 kg)   06/29/17 99 lb 6.4 oz (45.1 kg)              Today, you had the following     No orders found for display         Today's Medication Changes          These changes are accurate as of: 8/29/17 11:59 PM.  If you have any questions, ask your nurse or doctor.               Start taking these medicines.        Dose/Directions    ranitidine 150 MG tablet   Commonly known as:  ZANTAC   Used for:  Gastroesophageal reflux disease, esophagitis presence not specified   Started by:  Luis Alberto Maya MD        Dose:  150 mg   Take 1 tablet (150 mg) by mouth 2 times daily   Quantity:  60 tablet   Refills:  1            Where to get your medicines      These medications were sent to [facility] - [place], [place] - [address] [place] [address] [address]kw[address]  [address], [place] [address]     Phone:  [phone]     ranitidine 150 MG tablet                Primary Care Provider Office Phone # Fax #    Luis Alberto Maya [phone]       [facility] [address] [place] [address]        Equal Access to Services     [facility] Hospital[facility]ULIETTE AH: Hadii aad ku hadasho Soomaali, waaxda luqadaha, qaybta kaalmada adeegyada, waxay idiin hayaan tanner small . So [facility] [phone].    ATENCIÓN: Si habla español, tiene a bedoya disposición servicios gratuitos de asistencia lingüística. Llame al [phone].    We comply with applicable federal civil rights laws and [place] laws. We do not discriminate on the basis of race, color, national origin, age, disability sex, sexual orientation or gender identity.            Thank you!     Thank you for choosing [facility]  for your care. Our goal is always to provide you with excellent care. Hearing back from our patients is one way we can continue to improve our services. Please take a few minutes to complete the written survey that you may receive in the mail after your visit with us. Thank you!              Your Updated Medication List - Protect others around you: Learn how to safely use, store and throw away your medicines at www.disposemymeds.org.          This list is accurate as of: 8/29/17 11:59 PM.  Always use your most recent med list.                   Brand Name Dispense Instructions for use Diagnosis    acetaminophen 500 MG tablet    TYLENOL    30 tablet    Take 1 tablet (500 mg) by mouth every 4 hours as needed for pain    Primary osteoarthritis involving multiple joints       capsaicin 0.025 % Crea cream    ZOSTRIX    45 g    Apply twice daily as needed    Primary osteoarthritis involving multiple joints       citalopram 20 MG tablet    celeXA    30 tablet    Take 1 tablet (20 mg) by mouth daily        ENSURE HIGH PROTEIN     60 Can    Take 1 Can by mouth 2 times daily    Mild malnutrition (H)       glycerin-hypromellose- 0.2-0.2-1 % Soln ophthalmic solution    ARTIFICIAL TEARS    1 Bottle    Place 1 drop into both eyes 2 times daily    Dry eyes       naproxen 500 MG tablet    NAPROSYN    30 tablet    Take 1 tablet (500 mg) by mouth daily as needed for moderate pain    Primary osteoarthritis involving multiple joints       ranitidine 150 MG tablet    ZANTAC    60 tablet    Take 1 tablet (150 mg) by mouth 2 times daily    Gastroesophageal reflux disease, esophagitis presence not specified       simvastatin 20 MG tablet    ZOCOR    30 tablet    Take 1 tablet (20 mg) by mouth daily    Hypercholesteremia

## 2017-08-29 NOTE — PATIENT INSTRUCTIONS
Important Takeaway Points From This Visit:    Please try this new medication, Zantac twice a day for a month. This medication is safer than Omeprazole or Nexium.    If your symptoms aren't better in a month please come back to the clinic to let us know    Please give your extra copies of your advanced directive is given to family members      As always, please call with any questions or concerns. I look forward to seeing you again soon!    Take care,  Dr. Maya    Your current medication list is printed. Please keep this with you - it is helpful to bring this current list to any other medical appointments. It can also be helpful if you ever go to the emergency room or hospital.    If you had lab testing today we will call you with the results. The phone number we will call with your results is # [phone] (home) . If this is not the best number please call our clinic and change the number.    If you need any refills, please call your pharmacy and they will contact us.    If you have any further concerns or wish to schedule another appointment, please call our office at [phone] during normal business hours (8-5, M-F).    If you have urgent medical questions that cannot wait, you may call [phone] at any time of day.    If you have a medical emergency, please call [phone].    Thank you for coming to [facility].

## 2017-08-31 DIAGNOSIS — M81.0 AGE RELATED OSTEOPOROSIS, UNSPECIFIED PATHOLOGICAL FRACTURE PRESENCE: Primary | ICD-10-CM

## 2017-08-31 DIAGNOSIS — F33.1 MODERATE EPISODE OF RECURRENT MAJOR DEPRESSIVE DISORDER (H): ICD-10-CM

## 2017-08-31 DIAGNOSIS — M81.0 OSTEOPOROSIS: ICD-10-CM

## 2017-08-31 RX ORDER — BUPROPION HYDROCHLORIDE 150 MG/1
150 TABLET ORAL EVERY MORNING
Qty: 30 TABLET | Refills: 2 | Status: SHIPPED | OUTPATIENT
Start: 2017-08-31 | End: 2017-11-20

## 2017-08-31 RX ORDER — ALENDRONATE SODIUM 70 MG/1
70 TABLET ORAL
Qty: 4 TABLET | Refills: 5 | Status: SHIPPED | OUTPATIENT
Start: 2017-08-31 | End: 2018-01-29

## 2017-09-22 ENCOUNTER — OFFICE VISIT (OUTPATIENT)
Dept: FAMILY MEDICINE | Facility: CLINIC | Age: 72
End: 2017-09-22

## 2017-09-22 VITALS
HEART RATE: 67 BPM | TEMPERATURE: 98.4 F | WEIGHT: 100 LBS | DIASTOLIC BLOOD PRESSURE: 71 MMHG | HEIGHT: 55 IN | OXYGEN SATURATION: 97 % | SYSTOLIC BLOOD PRESSURE: 123 MMHG | BODY MASS INDEX: 23.14 KG/M2

## 2017-09-22 DIAGNOSIS — H57.9 ITCH OF EYE: ICD-10-CM

## 2017-09-22 DIAGNOSIS — F33.1 MODERATE EPISODE OF RECURRENT MAJOR DEPRESSIVE DISORDER (H): Primary | ICD-10-CM

## 2017-09-22 DIAGNOSIS — H53.8 BLURRED VISION: ICD-10-CM

## 2017-09-22 DIAGNOSIS — K21.9 GASTROESOPHAGEAL REFLUX DISEASE, ESOPHAGITIS PRESENCE NOT SPECIFIED: ICD-10-CM

## 2017-09-22 RX ORDER — KETOROLAC TROMETHAMINE 4 MG/ML
1 SOLUTION/ DROPS OPHTHALMIC 2 TIMES DAILY
Qty: 1 BOTTLE | Refills: 1 | Status: SHIPPED | OUTPATIENT
Start: 2017-09-22 | End: 2018-02-16

## 2017-09-22 NOTE — PATIENT INSTRUCTIONS
Important Takeaway Points From This Visit:    I have given you a new medicated eye drop to try for your itchy eyes    I would like to have you see an eye doctor too to check the pressure in your eyes given your vision changes.      As always, please call with any questions or concerns. I look forward to seeing you again soon!    Take care,  Dr. Maya    Your current medication list is printed. Please keep this with you - it is helpful to bring this current list to any other medical appointments. It can also be helpful if you ever go to the emergency room or hospital.    If you had lab testing today we will call you with the results. The phone number we will call with your results is # [phone] (home) . If this is not the best number please call our clinic and change the number.    If you need any refills, please call your pharmacy and they will contact us.    If you have any further concerns or wish to schedule another appointment, please call our office at [phone] during normal business hours (8-5, M-F).    If you have urgent medical questions that cannot wait, you may call [phone] at any time of day.    If you have a medical emergency, please call [phone].    Thank you for coming to [facility].      Referral for (Test): Optometry  Location/Place/Provider: DR Olivier 116Gisela Malone   Date/Time:10/13/17 at 10:00 AM  Phone: [phone]  Fax: [phone]  Additional information/prep.: Confirmed appointment with patient.  Scheduled by: Tiara BASS

## 2017-09-22 NOTE — NURSING NOTE
Flu vaccine--pt received at adult . Updated in our chart.  Mammogram--pt decline  Offer NCS40--sd decline. Believe already receive vaccine elsewhere but does not recall when.e     name: Rahel Yanira  Language: Hmgalina  Agency: [employer]  Phone number: [phone]

## 2017-09-22 NOTE — MR AVS SNAPSHOT
After Visit Summary   9/22/2017    See You    MRN: [id number]           Patient Information     Date Of Birth          1945        Visit Information        Provider Department      9/22/2017 1:20 PM Luis Alberto Maya MD [facility]        Today's Diagnoses     Moderate episode of recurrent major depressive disorder (H)    -  1    Gastroesophageal reflux disease, esophagitis presence not specified        Itch of eye        Blurred vision          Care Instructions    Important Takeaway Points From This Visit:    I have given you a new medicated eye drop to try for your itchy eyes    I would like to have you see an eye doctor too to check the pressure in your eyes given your vision changes.      As always, please call with any questions or concerns. I look forward to seeing you again soon!    Take care,  Dr. Maya    Your current medication list is printed. Please keep this with you - it is helpful to bring this current list to any other medical appointments. It can also be helpful if you ever go to the emergency room or hospital.    If you had lab testing today we will call you with the results. The phone number we will call with your results is # [phone] (home) . If this is not the best number please call our clinic and change the number.    If you need any refills, please call your pharmacy and they will contact us.    If you have any further concerns or wish to schedule another appointment, please call our office at [phone] during normal business hours (8-5, M-F).    If you have urgent medical questions that cannot wait, you may call [phone] at any time of day.    If you have a medical emergency, please call [phone].    Thank you for coming to [facility].              Follow-ups after your visit        Additional Services     OPTOMETRY REFERRAL       Your provider has referred you to: patient preference    Please be aware that coverage of these  services is subject to the terms and limitations of your health insurance plan.  Call member services at your health plan with any benefit or coverage questions.      Please bring the following with you to your appointment:    (1) Any X-Rays, CTs or MRIs which have been performed.  Contact the facility where they were done to arrange for  prior to your scheduled appointment.    (2) List of current medications  (3) This referral request   (4) Any documents/labs given to you for this referral                  Who to contact     Please call your clinic at [phone] to:    Ask questions about your health    Make or cancel appointments    Discuss your medicines    Learn about your test results    Speak to your doctor   If you have compliments or concerns about an experience at your clinic, or if you wish to file a complaint, please contact [facility] Patient Relations at [phone] or email us at [email or web address]         Additional Information About Your Visit        Ionic SecurityharPlurchase Information     Imaging3t is an electronic gateway that provides easy, online access to your medical records. With Bizzuka, you can request a clinic appointment, read your test results, renew a prescription or communicate with your care team.     To sign up for Imaging3t visit the website at [email or web address]   You will be asked to enter the access code listed below, as well as some personal information. Please follow the directions to create your username and password.     Your access code is: [email or web address]  Expires: 12/10/2017  8:07 AM     Your access code will  in 90 days. If you need help or a new code, please contact your [facility] or call [phone] for assistance.        Care EveryWhere ID     This is your Care EveryWhere ID. This could be used by other organizations to access your [facility] medical records  [id number]        Your Vitals Were   "   Pulse Temperature Height Pulse Oximetry BMI (Body Mass Index)       67 98.4  F (36.9  C) (Oral) 4' 6.75\" (139.1 cm) 97% 23.45 kg/m2        Blood Pressure from Last 3 Encounters:   09/22/17 123/71   08/29/17 104/65   07/31/17 130/76    Weight from Last 3 Encounters:   09/22/17 100 lb (45.4 kg)   08/29/17 99 lb 12.8 oz (45.3 kg)   07/31/17 98 lb 9.6 oz (44.7 kg)              We Performed the Following     OPTOMETRY REFERRAL          Today's Medication Changes          These changes are accurate as of: 9/22/17  1:53 PM.  If you have any questions, ask your nurse or doctor.               Start taking these medicines.        Dose/Directions    ketorolac tromethamine 0.4 % Soln ophthalmic solution   Commonly known as:  ACULAR-LS   Used for:  Itch of eye   Started by:  Luis Alberto Maya MD        Dose:  1 drop   Place 1 drop into both eyes 2 times daily   Quantity:  1 Bottle   Refills:  1            Where to get your medicines      These medications were sent to [facility] - [place], [place] - [address] [place] [address] [address]  [address], [place] [address]     Phone:  [phone]     ketorolac tromethamine 0.4 % Soln ophthalmic solution                Primary Care Provider Office Phone # Fax #    Luis Alberto Maya [phone]       [facility] [address] [place] [address]        Equal Access to Services     BANDAR MENDOZA AH: Hadii cierra ku hadasho Soomaali, waaxda luqadaha, qaybta kaalmada adeegyada, waxay idichristina hale. So [facility] [phone].    ATENCIÓN: Si habla español, tiene a bedoya disposición servicios gratuitos de asistencia lingüística. Llame al [phone].    We comply with applicable federal civil rights laws and [place] laws. We do not discriminate on the basis of race, color, national origin, age, disability sex, sexual orientation or gender identity.            Thank you!     Thank you for choosing [facility]  for " your care. Our goal is always to provide you with excellent care. Hearing back from our patients is one way we can continue to improve our services. Please take a few minutes to complete the written survey that you may receive in the mail after your visit with us. Thank you!             Your Updated Medication List - Protect others around you: Learn how to safely use, store and throw away your medicines at www.disposemymeds.org.          This list is accurate as of: 9/22/17  1:53 PM.  Always use your most recent med list.                   Brand Name Dispense Instructions for use Diagnosis    acetaminophen 500 MG tablet    TYLENOL    30 tablet    Take 1 tablet (500 mg) by mouth every 4 hours as needed for pain    Primary osteoarthritis involving multiple joints       alendronate 70 MG tablet    FOSAMAX    4 tablet    Take 1 tablet (70 mg) by mouth every 7 days Take 60 minutes before am meal with 8 oz. water. Remain upright for 30 minutes.    Age related osteoporosis, unspecified pathological fracture presence       buPROPion 150 MG 24 hr tablet    WELLBUTRIN XL    30 tablet    Take 1 tablet (150 mg) by mouth every morning    Moderate episode of recurrent major depressive disorder (H)       capsaicin 0.025 % Crea cream    ZOSTRIX    45 g    Apply twice daily as needed    Primary osteoarthritis involving multiple joints       citalopram 20 MG tablet    celeXA    30 tablet    Take 1 tablet (20 mg) by mouth daily        ENSURE HIGH PROTEIN     60 Can    Take 1 Can by mouth 2 times daily    Mild malnutrition (H)       glycerin-hypromellose- 0.2-0.2-1 % Soln ophthalmic solution    ARTIFICIAL TEARS    1 Bottle    Place 1 drop into both eyes 2 times daily    Dry eyes       ketorolac tromethamine 0.4 % Soln ophthalmic solution    ACULAR-LS    1 Bottle    Place 1 drop into both eyes 2 times daily    Itch of eye       naproxen 500 MG tablet    NAPROSYN    30 tablet    Take 1 tablet (500 mg) by mouth daily as needed for  moderate pain    Primary osteoarthritis involving multiple joints       ranitidine 150 MG tablet    ZANTAC    60 tablet    Take 1 tablet (150 mg) by mouth 2 times daily    Gastroesophageal reflux disease, esophagitis presence not specified       simvastatin 20 MG tablet    ZOCOR    30 tablet    Take 1 tablet (20 mg) by mouth daily    Hypercholesteremia

## 2017-09-22 NOTE — PROGRESS NOTES
HPI:   See You is a 71 year old  female who presents to clinic today for follow-up of her abdominal pain and bilateral eye itching.     I previously saw her for her abdominal pain on 8/29/17. She was previously on omeprazole prior to her trip to [place] for this. Her friend offered her Nexium when she returned which completely resolved her pain. After our last appointment I gave her a prescription for ranitidine which has completely resolved her pain.    She is more concerned about her bilateral eye itching R > L. This has been going on for ~ 1 year. She has associated blurry vision in the morning. She does feel something is caught in her eye at times.    Denies eye pain, fever, chills, runny nose, diarrhea, sore throat, cough, ear pain, and changes in hearing.    Previously prescribed drops when living in [place] that helped. Lubricating eye drops prescribed here have not been helping.    She uses a hand lotion/cream; however, this has not changed recently. She denies using facial cosmetic products.    Her mood is very good today. PHQ-9 = 0 today. No side effects from Wellbutrin and seeing therapist next week on the 29th.    Patient is an established patient of this clinic.     A Hot Dot  was used for this visit         PMHX:   Active Problems List  Patient Active Problem List   Diagnosis     Major depression     Esophageal reflux     Generalized osteoarthrosis, unspecified site     Hyperlipidemia     Osteoporosis     Prediabetes     Active problem list reviewed and updated.    Current Medications  Current Outpatient Prescriptions   Medication Sig Dispense Refill     alendronate (FOSAMAX) 70 MG tablet Take 1 tablet (70 mg) by mouth every 7 days Take 60 minutes before am meal with 8 oz. water. Remain upright for 30 minutes. 4 tablet 5     buPROPion (WELLBUTRIN XL) 150 MG 24 hr tablet Take 1 tablet (150 mg) by mouth every morning 30 tablet 2     ranitidine (ZANTAC) 150 MG tablet Take 1 tablet  "(150 mg) by mouth 2 times daily 60 tablet 1     naproxen (NAPROSYN) 500 MG tablet Take 1 tablet (500 mg) by mouth daily as needed for moderate pain 30 tablet 1     glycerin-hypromellose- (ARTIFICIAL TEARS) 0.2-0.2-1 % SOLN ophthalmic solution Place 1 drop into both eyes 2 times daily 1 Bottle 3     simvastatin (ZOCOR) 20 MG tablet Take 1 tablet (20 mg) by mouth daily 30 tablet 3     citalopram (CELEXA) 20 MG tablet Take 1 tablet (20 mg) by mouth daily 30 tablet 6     Nutritional Supplements (ENSURE HIGH PROTEIN) Take 1 Can by mouth 2 times daily 60 Can 11     acetaminophen (TYLENOL) 500 MG tablet Take 1 tablet (500 mg) by mouth every 4 hours as needed for pain 30 tablet 11     capsaicin (ZOSTRIX) 0.025 % CREA cream Apply twice daily as needed 45 g 3     Medication list reviewed and updated.    Social History  Social History   Substance Use Topics     Smoking status: Never Smoker     Smokeless tobacco: Not on file     Alcohol use No     History   Drug Use No     Allergies  Allergies   Allergen Reactions     Nka [No Known Allergies]      Allergies and Medication Intolerances Updated         Physical Exam:     Vitals:    09/22/17 1323   BP: 123/71   Pulse: 67   Temp: 98.4  F (36.9  C)   TempSrc: Oral   SpO2: 97%   Weight: 100 lb (45.4 kg)   Height: 4' 6.75\" (139.1 cm)     Body mass index is 23.45 kg/(m^2).     General: Appears well and in no acute distress.  HEENT: Erythematous medial superior semilunar fold right greater than. Eyes otherwise grossly normal to inspection. Extraocular movements intact. Pupils equal, round, and reactive to light. Mucous membranes moist. No ulcers or lesions noted in the oropharynx. Conjunctiva slightly injected. Sclera normal.  Cardiovascular: Regular rate and rhythm, normal S1 and S2 without murmur. No extra heartsounds or friction rub. Radial pulses present and equal bilaterally.  Respiratory: Lungs clear to auscultation bilaterally. No wheezing or crackles.  GI/Rectal: Soft, " non-tender abdomen. No hepatosplenomegaly. Normal active bowel sounds.    Assessment and Plan   1. Moderate episode of recurrent major depressive disorder (H): Much improved. Scheduled with therapist    2. Gastroesophageal reflux disease, esophagitis presence not specified: Resolved with Zantac. Continue therapy. If continues consider association with patients fosamax.    3. Itch of eye: No history of trauma. No symptoms of pain. But does have changes in vision. Bilateral and does so less likely cellulitis. Consider dry eye, allergies, or cosmetic irritation (lotions other unknown environmental factor). Will prescribe Acular drops and F/u prn.  - ketorolac tromethamine (ACULAR-LS) 0.4 % SOLN ophthalmic solution; Place 1 drop into both eyes 2 times daily  Dispense: 1 Bottle; Refill: 1    4. Blurred vision: No eye pain, but should have occular pressures measured given report of morning vision blurriness.  - OPTOMETRY REFERRAL    Options for treatment and follow-up care were reviewed with the patient and/or guardian. See You and/or guardian engaged in the decision making process and verbalized understanding of the options discussed and agreed with the final plan.    Luis Alberto Maya MD  [facility] Resident  Pager# [phone]    Precepted with: Maryellen Nickerson MD      This chart is completed utilizing dictation software; typos and/or incorrect word substitutions may unintentionally occur.

## 2017-09-27 ASSESSMENT — PATIENT HEALTH QUESTIONNAIRE - PHQ9: SUM OF ALL RESPONSES TO PHQ QUESTIONS 1-9: 0

## 2017-09-28 NOTE — PROGRESS NOTES
Preceptor Attestation:  Patient's case reviewed and discussed with Luis Alberto Maya MD.  Patient seen and discussed with the resident.  I agree with assessment and plan of care.  Supervising Physician:  Maryellen Nickerson MD  [facility]

## 2017-10-05 ENCOUNTER — MEDICAL CORRESPONDENCE (OUTPATIENT)
Dept: HEALTH INFORMATION MANAGEMENT | Facility: CLINIC | Age: 72
End: 2017-10-05

## 2017-10-23 DIAGNOSIS — K21.9 GASTROESOPHAGEAL REFLUX DISEASE, ESOPHAGITIS PRESENCE NOT SPECIFIED: ICD-10-CM

## 2017-11-20 DIAGNOSIS — F33.1 MODERATE EPISODE OF RECURRENT MAJOR DEPRESSIVE DISORDER (H): ICD-10-CM

## 2017-11-21 RX ORDER — BUPROPION HYDROCHLORIDE 150 MG/1
150 TABLET ORAL EVERY MORNING
Qty: 30 TABLET | Refills: 2 | Status: SHIPPED | OUTPATIENT
Start: 2017-11-21 | End: 2018-02-12

## 2017-12-19 DIAGNOSIS — F33.42 RECURRENT MAJOR DEPRESSIVE DISORDER, IN FULL REMISSION (H): Primary | ICD-10-CM

## 2017-12-19 RX ORDER — CITALOPRAM HYDROBROMIDE 20 MG/1
20 TABLET ORAL DAILY
Qty: 30 TABLET | Refills: 6 | Status: SHIPPED | OUTPATIENT
Start: 2017-12-19 | End: 2018-02-16

## 2018-01-29 DIAGNOSIS — M81.0 AGE RELATED OSTEOPOROSIS, UNSPECIFIED PATHOLOGICAL FRACTURE PRESENCE: ICD-10-CM

## 2018-01-29 RX ORDER — ALENDRONATE SODIUM 70 MG/1
70 TABLET ORAL
Qty: 4 TABLET | Refills: 5 | Status: SHIPPED | OUTPATIENT
Start: 2018-01-29 | End: 2018-07-02

## 2018-02-12 DIAGNOSIS — F33.1 MODERATE EPISODE OF RECURRENT MAJOR DEPRESSIVE DISORDER (H): ICD-10-CM

## 2018-02-12 RX ORDER — BUPROPION HYDROCHLORIDE 150 MG/1
150 TABLET ORAL EVERY MORNING
Qty: 30 TABLET | Refills: 2 | Status: SHIPPED | OUTPATIENT
Start: 2018-02-12 | End: 2018-02-16

## 2018-02-16 ENCOUNTER — RECORDS - HEALTHEAST (OUTPATIENT)
Dept: ADMINISTRATIVE | Facility: OTHER | Age: 73
End: 2018-02-16

## 2018-02-16 ENCOUNTER — OFFICE VISIT (OUTPATIENT)
Dept: FAMILY MEDICINE | Facility: CLINIC | Age: 73
End: 2018-02-16
Payer: COMMERCIAL

## 2018-02-16 VITALS
SYSTOLIC BLOOD PRESSURE: 128 MMHG | HEART RATE: 64 BPM | DIASTOLIC BLOOD PRESSURE: 69 MMHG | HEIGHT: 55 IN | TEMPERATURE: 98.1 F | BODY MASS INDEX: 22.91 KG/M2 | OXYGEN SATURATION: 96 % | RESPIRATION RATE: 12 BRPM | WEIGHT: 99 LBS

## 2018-02-16 DIAGNOSIS — M15.0 PRIMARY OSTEOARTHRITIS INVOLVING MULTIPLE JOINTS: ICD-10-CM

## 2018-02-16 DIAGNOSIS — Z13.9 SCREENING FOR CONDITION: ICD-10-CM

## 2018-02-16 DIAGNOSIS — H57.89 EYE IRRITATION: ICD-10-CM

## 2018-02-16 DIAGNOSIS — F33.1 MODERATE EPISODE OF RECURRENT MAJOR DEPRESSIVE DISORDER (H): Primary | ICD-10-CM

## 2018-02-16 LAB
BUN SERPL-MCNC: 17 MG/DL (ref 7–30)
CALCIUM SERPL-MCNC: 9.4 MG/DL (ref 8.5–10.4)
CHLORIDE SERPLBLD-SCNC: 105 MMOL/L (ref 94–109)
CO2 SERPL-SCNC: 25 MMOL/L (ref 20–32)
CREAT SERPL-MCNC: 0.6 MG/DL (ref 0.6–1.3)
EGFR CALCULATED (BLACK REFERENCE): >90 ML/MIN
EGFR CALCULATED (NON BLACK REFERENCE): >90 ML/MIN
GLUCOSE SERPL-MCNC: 106 MG/DL (ref 60–109)
POTASSIUM SERPL-SCNC: 3.6 MMOL/L (ref 3.4–5.3)
SODIUM SERPL-SCNC: 141 MMOL/L (ref 133–144)

## 2018-02-16 RX ORDER — BUPROPION HYDROCHLORIDE 300 MG/1
300 TABLET ORAL EVERY MORNING
Qty: 30 TABLET | Refills: 1 | Status: SHIPPED | OUTPATIENT
Start: 2018-02-16 | End: 2018-04-12

## 2018-02-16 RX ORDER — NAPROXEN 500 MG/1
500 TABLET ORAL DAILY PRN
Qty: 30 TABLET | Refills: 1 | Status: SHIPPED | OUTPATIENT
Start: 2018-02-16 | End: 2018-07-27

## 2018-02-16 NOTE — MR AVS SNAPSHOT
After Visit Summary   2018    See You    MRN: [id number]           Patient Information     Date Of Birth          1945        Visit Information        Provider Department      2018 9:40 AM Chasity Gill MD [facility]        Today's Diagnoses     Moderate episode of recurrent major depressive disorder (H)    -  1    Eye irritation        Screening for condition          Care Instructions    Suspect eye irritation is from crying and low mood.  Will increase wellbutrin to 300mg daily to help with this  Can keep using saline eye drops to help with eye irritation  Return to clinic in 1 month for recheck          Follow-ups after your visit        Future tests that were ordered for you today     Open Future Orders        Priority Expected Expires Ordered    Fecal Occult Blood ([facility]) Routine  2019            Who to contact     Please call your clinic at [phone] to:    Ask questions about your health    Make or cancel appointments    Discuss your medicines    Learn about your test results    Speak to your doctor            Additional Information About Your Visit        MyChart Information     TEAM INTERVAL is an electronic gateway that provides easy, online access to your medical records. With TEAM INTERVAL, you can request a clinic appointment, read your test results, renew a prescription or communicate with your care team.     To sign up for TEAM INTERVAL visit the website at [email or web address]   You will be asked to enter the access code listed below, as well as some personal information. Please follow the directions to create your username and password.     Your access code is: [email or web address]  Expires: 2018 10:48 AM     Your access code will  in 90 days. If you need help or a new code, please contact your [facility] or call [phone] for assistance.        Care EveryWhere ID     This is your Care EveryWhere ID.  "This could be used by other organizations to access your [facility] medical records  [id number]        Your Vitals Were     Pulse Temperature Respirations Height Pulse Oximetry BMI (Body Mass Index)    64 98.1  F (36.7  C) (Oral) 12 4' 6.25\" (137.8 cm) 96% 23.65 kg/m2       Blood Pressure from Last 3 Encounters:   02/16/18 128/69   09/22/17 123/71   08/29/17 104/65    Weight from Last 3 Encounters:   02/16/18 99 lb (44.9 kg)   09/22/17 100 lb (45.4 kg)   08/29/17 99 lb 12.8 oz (45.3 kg)              We Performed the Following     Basic Metabolic Panel ([facility])  - Results < 1 hr     [facility]atitis C Antibody ([facility])     MA SCREENING DIGITAL BILAT          Today's Medication Changes          These changes are accurate as of 2/16/18 10:48 AM.  If you have any questions, ask your nurse or doctor.               These medicines have changed or have updated prescriptions.        Dose/Directions    buPROPion 300 MG 24 hr tablet   Commonly known as:  WELLBUTRIN XL   This may have changed:    - medication strength  - how much to take   Used for:  Moderate episode of recurrent major depressive disorder (H)   Changed by:  Chasity Gill MD        Dose:  300 mg   Take 1 tablet (300 mg) by mouth every morning   Quantity:  30 tablet   Refills:  1            Where to get your medicines      These medications were sent to [facility] - [place], [place] - [address]  [address], [place] [address]     Phone:  [phone]     buPROPion 300 MG 24 hr tablet                Primary Care Provider Office Phone # Fax #    Luis Alberto Maya [phone]       [facility] [address] [place] [address]        Equal Access to Services     HANNA MENDOZA AH: Lennox Manuel, waaxda luqadaha, qaybta kaalmada chanelle, grover hale. So [facility] [phone].    ATENCIÓN: Si habla español, tiene a bedoya disposición servicios gratuitos " de asistencia lingüística. Sonja soriano [phone].    We comply with applicable federal civil rights laws and [place] laws. We do not discriminate on the basis of race, color, national origin, age, disability, sex, sexual orientation, or gender identity.            Thank you!     Thank you for choosing [facility]  for your care. Our goal is always to provide you with excellent care. Hearing back from our patients is one way we can continue to improve our services. Please take a few minutes to complete the written survey that you may receive in the mail after your visit with us. Thank you!             Your Updated Medication List - Protect others around you: Learn how to safely use, store and throw away your medicines at www.disposemymeds.org.          This list is accurate as of 2/16/18 10:48 AM.  Always use your most recent med list.                   Brand Name Dispense Instructions for use Diagnosis    acetaminophen 500 MG tablet    TYLENOL    30 tablet    Take 1 tablet (500 mg) by mouth every 4 hours as needed for pain    Primary osteoarthritis involving multiple joints       alendronate 70 MG tablet    FOSAMAX    4 tablet    Take 1 tablet (70 mg) by mouth every 7 days Take 60 minutes before am meal with 8 oz. water. Remain upright for 30 minutes.    Age related osteoporosis, unspecified pathological fracture presence       buPROPion 300 MG 24 hr tablet    WELLBUTRIN XL    30 tablet    Take 1 tablet (300 mg) by mouth every morning    Moderate episode of recurrent major depressive disorder (H)       capsaicin 0.025 % Crea cream    ZOSTRIX    45 g    Apply twice daily as needed    Primary osteoarthritis involving multiple joints       ENSURE HIGH PROTEIN     60 Can    Take 1 Can by mouth 2 times daily    Mild malnutrition (H)       glycerin-hypromellose- 0.2-0.2-1 % Soln ophthalmic solution    ARTIFICIAL TEARS    1 Bottle    Place 1 drop into both eyes 2 times daily    Dry eyes       naproxen  500 MG tablet    NAPROSYN    30 tablet    Take 1 tablet (500 mg) by mouth daily as needed for moderate pain    Primary osteoarthritis involving multiple joints       ranitidine 150 MG tablet    ZANTAC    60 tablet    Take 1 tablet (150 mg) by mouth 2 times daily    Gastroesophageal reflux disease, esophagitis presence not specified       simvastatin 20 MG tablet    ZOCOR    30 tablet    Take 1 tablet (20 mg) by mouth daily    Hypercholesteremia

## 2018-02-16 NOTE — NURSING NOTE
name: Jayjay Orta  Language: Hmong  Agency: [employer]tts  Phone number: [phone]    phq9 given to  to help pt fill out  Kori Black, SMA

## 2018-02-16 NOTE — PROGRESS NOTES
Preceptor Attestation:  Patient's case reviewed and discussed with Chasity Gill MD Patient seen and discussed with the resident.. I agree with assessment and plan of care.  Supervising Physician:  Nancy Cook MD  [facility]

## 2018-02-16 NOTE — PROGRESS NOTES
"       HPI:       See You is a 72 year old female with a hx of major depression, GERD and osteoporosis who presents for follow up of concern(s) listed below    Red and itchy eyes:  - She was seen in clinic Sept 2017 for bilateral erythema and pruritis of her eyes associated with blurriness of vision especially in the morning. She was prescribed acular drops and artificial tears and referred to optometry for further evaluation   - She saw optometry and was prescribed erythromycin cream and ketotifen drops. She feels these worsened symptoms and self-discontinued 1 month ago.   - Symptoms include: itching, redness, excessive tearing and blurriness. Symptoms are similar bilaterally. Feels blurriness is worse but all other symptoms are similar in severity the past 6 months  - worsens with crying. She reports she is crying too many times to count every day (see below)  - She has been using inder and a cold compress. Has improved her symptoms slightly. Has not found any of the creams or eye drops she has used previously to be helpful  - No seasonal allergies. No congestion, rhinorrhea, sneezing, ear issues, sore/dry throat. No dry mouth  - No new cosmetics, soaps, lotions, detergents etc.     Depression - PHQ-9 today = 3 (scored 3 for feeling depressed, down or hopeless), somewhat difficult with functioning  - had been taking celexa 20mg and wellbutrin 150mg at last appointment in September.   - self-discontinued celexa one month ago because she felt that it made her foggy. She has not tried other anti-depressants in the past   - she states she cries frequently because she is upset about \"suffering\" related to feeling rejected by her family and living alone.   - feels people at [facility] are supportive but only sees them once a week  - denies suicidal ideation, auditory hallucinations, visual hallucinations  - had previously going to therapy but her provider left the practice and hasn't been able to establish with a new " provider yet; states she has been told that she will get in within 2 weeks    [employer]  was used for this visit         PMHX:     Patient Active Problem List   Diagnosis     Major depression     Esophageal reflux     Generalized osteoarthrosis, unspecified site     Hyperlipidemia     Osteoporosis     Prediabetes     Current Outpatient Prescriptions   Medication Sig Dispense Refill     buPROPion (WELLBUTRIN XL) 300 MG 24 hr tablet Take 1 tablet (300 mg) by mouth every morning 30 tablet 1     ranitidine (ZANTAC) 150 MG tablet Take 1 tablet (150 mg) by mouth 2 times daily 60 tablet 11     naproxen (NAPROSYN) 500 MG tablet Take 1 tablet (500 mg) by mouth daily as needed for moderate pain 30 tablet 1     glycerin-hypromellose- (ARTIFICIAL TEARS) 0.2-0.2-1 % SOLN ophthalmic solution Place 1 drop into both eyes 2 times daily 1 Bottle 3     simvastatin (ZOCOR) 20 MG tablet Take 1 tablet (20 mg) by mouth daily 30 tablet 3     [DISCONTINUED] buPROPion (WELLBUTRIN XL) 150 MG 24 hr tablet Take 1 tablet (150 mg) by mouth every morning 30 tablet 2     alendronate (FOSAMAX) 70 MG tablet Take 1 tablet (70 mg) by mouth every 7 days Take 60 minutes before am meal with 8 oz. water. Remain upright for 30 minutes. 4 tablet 5     Nutritional Supplements (ENSURE HIGH PROTEIN) Take 1 Can by mouth 2 times daily 60 Can 11     acetaminophen (TYLENOL) 500 MG tablet Take 1 tablet (500 mg) by mouth every 4 hours as needed for pain 30 tablet 11     capsaicin (ZOSTRIX) 0.025 % CREA cream Apply twice daily as needed 45 g 3     Social History     Social History     Marital status: Single     Spouse name: N/A     Number of children: N/A     Years of education: N/A     Occupational History     Not on file.     Social History Main Topics     Smoking status: Never Smoker     Smokeless tobacco: Never Used     Alcohol use No     Drug use: No     Sexual activity: No     Other Topics Concern     Not on file     Social History Narrative     "This January moved to [place] from [place], [place]. [place] she lived with her son and his family, however, in January they kicked her out of the house and she relocated to be near her brother here. Here she lives alone in a small apartment.      Allergies   Allergen Reactions     Nka [No Known Allergies]           Review of Systems:   ROS negative except as noted above          Physical Exam:     Vitals:    02/16/18 0948   BP: 128/69   BP Location: Right arm   Patient Position: Sitting   Cuff Size: Adult Regular   Pulse: 64   Resp: 12   Temp: 98.1  F (36.7  C)   TempSrc: Oral   SpO2: 96%   Weight: 99 lb (44.9 kg)   Height: 4' 6.25\" (137.8 cm)     Body mass index is 23.65 kg/(m^2).    General appearance: alert, NAD  HEENT: atraumatic, normocephalic, PERRL, EOMI, no scleral icterus or injection. No discharge from eyes. No palpebral conjunctival edema. ears and nose normal, moist mucous membranes. No oral lesions or ulcers. Posterior oropharynx clear. No pain with movement of eyes. No swelling of eyelids. No redness of eyes.  Neck: supple, few palpable anterior cervical nodes bilaterally but none tender and not enlarged. normal ROM  CV: RRR, no murmurs/rubs/gallops, normal S1 and S2  Lungs: CTAB, no wheezes or crackles, breathing comfortably on room air  Neuro: alert, oriented x3, CNs grossly intact, no focal deficits appreciated  Psych: depressed affect and tearful at times during visit, answering questions appropriately, linear thought process    Assessment and Plan     Depression - clearly uncontrolled though only scored 3 on PHQ9 today. Pt tearful throughout visit but open to sharing her feelings. Pt previously on celexa and wellbutrin but stopped celexa d/t side effect (fogginess); no issues taking wellbutrin. Discussed increasing Wellbutrin or adding a different SSRI - pt wishes to increase wellbutrin.   - Increase Wellbutrin to 300mg  - Restart therapy. Instructed to call clinic for new referral if not able " to get an appointment in the next 2-4 weeks   - F/U in 1 month to discuss medication change and assess if able to get into therapy     Bilateral eye irritation - feel this is most likely due to frequent crying. No evidence of conjunctivitis or other infection on today's exam; no signs of allergies. Will focus on improving mental health in order to reduce symptoms. Encouraged symptomatic treatment with lubricating drops and cold compresses. Will reassess at f/u appointment in 4 weeks.     Preventive care: has not had BMP in many years, will recheck mainly for kidney function today. Due for hep C screening, done today.    Options for treatment and follow-up care were reviewed with the patient and/or guardian. See You and/or guardian engaged in the decision making process and verbalized understanding of the options discussed and agreed with the final plan.    Chasity Gill MD    Precepted today with: Nancy Cook MD

## 2018-02-16 NOTE — PATIENT INSTRUCTIONS
Suspect eye irritation is from crying and low mood.  Will increase wellbutrin to 300mg daily to help with this  Can keep using saline eye drops to help with eye irritation  Return to clinic in 1 month for recheck    Referral for ( TEST )  :      Mammogram  LOCATION/PLACE/Provider :    [facility]  DATE & TIME :     3-2-2018  PHONE :     [phone]  FAX :     [phone]  Appointment made by clinic staff/:    Iliana

## 2018-02-19 LAB — HCV AB SER QL: NEGATIVE

## 2018-02-24 ASSESSMENT — PATIENT HEALTH QUESTIONNAIRE - PHQ9: SUM OF ALL RESPONSES TO PHQ QUESTIONS 1-9: 3

## 2018-03-02 ENCOUNTER — HOSPITAL ENCOUNTER (OUTPATIENT)
Dept: MAMMOGRAPHY | Facility: HOSPITAL | Age: 73
Discharge: HOME | End: 2018-03-02

## 2018-03-02 DIAGNOSIS — Z12.31 VISIT FOR SCREENING MAMMOGRAM: ICD-10-CM

## 2018-03-02 LAB — MAMMOGRAM: NORMAL

## 2018-03-20 ENCOUNTER — RECORDS - HEALTHEAST (OUTPATIENT)
Dept: ADMINISTRATIVE | Facility: OTHER | Age: 73
End: 2018-03-20

## 2018-03-21 ENCOUNTER — OFFICE VISIT (OUTPATIENT)
Dept: FAMILY MEDICINE | Facility: CLINIC | Age: 73
End: 2018-03-21
Payer: COMMERCIAL

## 2018-03-21 VITALS
OXYGEN SATURATION: 99 % | BODY MASS INDEX: 23.37 KG/M2 | HEIGHT: 55 IN | TEMPERATURE: 97.8 F | WEIGHT: 101 LBS | HEART RATE: 49 BPM

## 2018-03-21 DIAGNOSIS — K21.9 GASTROESOPHAGEAL REFLUX DISEASE, ESOPHAGITIS PRESENCE NOT SPECIFIED: ICD-10-CM

## 2018-03-21 DIAGNOSIS — H04.123 DRY EYES: ICD-10-CM

## 2018-03-21 DIAGNOSIS — Z13.9 SCREENING FOR CONDITION: ICD-10-CM

## 2018-03-21 DIAGNOSIS — F33.1 MODERATE EPISODE OF RECURRENT MAJOR DEPRESSIVE DISORDER (H): Primary | ICD-10-CM

## 2018-03-21 LAB — HEMOCCULT STL QL IA: NEGATIVE

## 2018-03-21 NOTE — MR AVS SNAPSHOT
After Visit Summary   3/21/2018    See You    MRN: [id number]           Patient Information     Date Of Birth          1945        Visit Information        Provider Department      3/21/2018 9:40 AM Luis Alberto Maya MD [facility]        Today's Diagnoses     Moderate episode of recurrent major depressive disorder (H)    -  1    Gastroesophageal reflux disease, esophagitis presence not specified          Care Instructions    Important Takeaway Points From This Visit:    I have given you a referral for a new therapist since yours has left    Please call if you symptoms are worsening    I have not changed your medications today    Please see your eye doctor about your eyes.    We should see you again in 1 month. Please set up this appointment.    Please take your Wellbutrin in the morning rather than at night to help your sleep      As always, please call with any questions or concerns. I look forward to seeing you again soon!    Take care,  Dr. Maya    Your current medication list is printed. Please keep this with you - it is helpful to bring this current list to any other medical appointments. It can also be helpful if you ever go to the emergency room or hospital.    If you had lab testing today we will call you with the results. The phone number we will call with your results is # [phone] (home) . If this is not the best number please call our clinic and change the number.    If you need any refills, please call your pharmacy and they will contact us.    If you have any further concerns or wish to schedule another appointment, please call our office at [phone] during normal business hours (8-5, M-F).    If you have urgent medical questions that cannot wait, you may call [phone] at any time of day.    If you have a medical emergency, please call [phone].    Thank you for coming to [facility].              Follow-ups after your visit         Additional Services     MENTAL HEALTH REFERRAL  -       Use this form for behavioral health consults and assessments. The referral coordinator will help to determine whether patients are best served by clinic behavioral health staff or by community providers.    Type of referral(s) requested (indicate all that apply):  Adult Psychotherapy--for diagnosis and non-pharmacological treatment    Reason for referral: previous therapist left because of personal interest. Depression related to loss of  50 years ago    Currently receiving mental health services (if 'Yes', what services and why today's referral?): Yes: but not available anymore  Currently having suicidal thoughts: No  Previous psych hospitalization: No    Please provide data for below screening tools if available.   PHQ-9 Score: 12       needed: Yes  Language: Hmong                  Who to contact     Please call your clinic at [phone] to:    Ask questions about your health    Make or cancel appointments    Discuss your medicines    Learn about your test results    Speak to your doctor            Additional Information About Your Visit        MyChart Information     [employer] is an electronic gateway that provides easy, online access to your medical records. With [employer], you can request a clinic appointment, read your test results, renew a prescription or communicate with your care team.     To sign up for [employer] visit the website at [email or web address]   You will be asked to enter the access code listed below, as well as some personal information. Please follow the directions to create your username and password.     Your access code is: [email or web address]  Expires: 2018 11:48 AM     Your access code will  in 90 days. If you need help or a new code, please contact your [facility] or call [phone] for assistance.        Care EveryWhere ID     This is your Care EveryWhere ID. This could be  "used by other organizations to access your [facility] medical records  [id number]        Your Vitals Were     Pulse Temperature Height Pulse Oximetry BMI (Body Mass Index)       49 97.8  F (36.6  C) (Oral) 4' 6.8\" (139.2 cm) 99% 23.65 kg/m2        Blood Pressure from Last 3 Encounters:   02/16/18 128/69   09/22/17 123/71   08/29/17 104/65    Weight from Last 3 Encounters:   03/21/18 101 lb (45.8 kg)   02/16/18 99 lb (44.9 kg)   09/22/17 100 lb (45.4 kg)              We Performed the Following     MENTAL HEALTH REFERRAL  -        Primary Care Provider Office Phone # Fax #    Luis Alberto Maya [phone]       [facility] [address] [place] [address]        Equal Access to Services     [facility]: Hadii aad ku hadasho Soomaali, waaxda luqadaha, qaybta kaalmada adeegyada, waxay idiin hayaan tanner kharaana luisa la'aan . So [facility] [phone].    ATENCIÓN: Si habla español, tiene a bedoya disposición servicios gratuitos de asistencia lingüística. Sonja al [phone].    We comply with applicable federal civil rights laws and [place] laws. We do not discriminate on the basis of race, color, national origin, age, disability, sex, sexual orientation, or gender identity.            Thank you!     Thank you for choosing [facility]  for your care. Our goal is always to provide you with excellent care. Hearing back from our patients is one way we can continue to improve our services. Please take a few minutes to complete the written survey that you may receive in the mail after your visit with us. Thank you!             Your Updated Medication List - Protect others around you: Learn how to safely use, store and throw away your medicines at www.disposemymeds.org.          This list is accurate as of 3/21/18 11:02 AM.  Always use your most recent med list.                   Brand Name Dispense Instructions for use Diagnosis    acetaminophen 500 MG tablet    TYLENOL    30 tablet    " Take 1 tablet (500 mg) by mouth every 4 hours as needed for pain    Primary osteoarthritis involving multiple joints       alendronate 70 MG tablet    FOSAMAX    4 tablet    Take 1 tablet (70 mg) by mouth every 7 days Take 60 minutes before am meal with 8 oz. water. Remain upright for 30 minutes.    Age related osteoporosis, unspecified pathological fracture presence       buPROPion 300 MG 24 hr tablet    WELLBUTRIN XL    30 tablet    Take 1 tablet (300 mg) by mouth every morning    Moderate episode of recurrent major depressive disorder (H)       ENSURE HIGH PROTEIN     60 Can    Take 1 Can by mouth 2 times daily    Mild malnutrition (H)       glycerin-hypromellose- 0.2-0.2-1 % Soln ophthalmic solution    ARTIFICIAL TEARS    1 Bottle    Place 1 drop into both eyes 2 times daily    Dry eyes       naproxen 500 MG tablet    NAPROSYN    30 tablet    Take 1 tablet (500 mg) by mouth daily as needed for moderate pain    Primary osteoarthritis involving multiple joints       ranitidine 150 MG tablet    ZANTAC    60 tablet    Take 1 tablet (150 mg) by mouth 2 times daily    Gastroesophageal reflux disease, esophagitis presence not specified       simvastatin 20 MG tablet    ZOCOR    30 tablet    Take 1 tablet (20 mg) by mouth daily    Hypercholesteremia

## 2018-03-21 NOTE — PATIENT INSTRUCTIONS
Important Takeaway Points From This Visit:    I have given you a referral for a new therapist since yours has left    Please call if you symptoms are worsening    I have not changed your medications today    Please see your eye doctor about your eyes.    We should see you again in 1 month. Please set up this appointment.    Please take your Wellbutrin in the morning rather than at night to help your sleep      As always, please call with any questions or concerns. I look forward to seeing you again soon!    Take care,  Dr. Maya    Your current medication list is printed. Please keep this with you - it is helpful to bring this current list to any other medical appointments. It can also be helpful if you ever go to the emergency room or hospital.    If you had lab testing today we will call you with the results. The phone number we will call with your results is # [phone] (home) . If this is not the best number please call our clinic and change the number.    If you need any refills, please call your pharmacy and they will contact us.    If you have any further concerns or wish to schedule another appointment, please call our office at [phone] during normal business hours (8-5, M-F).    If you have urgent medical questions that cannot wait, you may call [phone] at any time of day.    If you have a medical emergency, please call [phone].    Thank you for coming to [facility].

## 2018-03-21 NOTE — PROGRESS NOTES
"  SUBJECTIVE:                                                    See You is a 72 year old year old female who presents to clinic today for the following health issues:    Depression Followup    Status since last visit: Stable, \"unchanged per patient\".     Has been more active on Wellbutrin.    See PHQ-9 for current symptoms (Score of 12).  Other associated symptoms: Sleeps only 4 hours a day (10 pm - 2 am), but feels rested.     Lives alone. Son and daughter-in-law come to visit her. Usually stays home and watches TV, no other socialization.    Has not been following with therapist for 2 months because the therapist left. They are looking to find her a new therapist. She believes this was helpful.    Complicating factors:   Significant life event:  Yes-  Passing of  50 years ago and loss of therapist   Current substance abuse:  None  Anxiety or Panic symptoms:  No    PHQ-9 9/22/2017 2/16/2018 3/21/2018   Total Score 0 3 12   Q9: Suicide Ideation Not at all Not at all Not at all     In the past two weeks have you had thoughts of suicide or self-harm?  No.    Do you have concerns about your personal safety or the safety of others?   No    Amount of exercise or physical activity: 4-5 days/week for an average of 45-60 minutes at adult     Problems taking medications regularly: No    Medication side effects: none, but has been taking Wellbutrin before bed    Dry Eyes:  States she feels this is unchanged. Feels very dry L>R. Saw eye doctor twice. She state she had \"3 eye bumps\" that went away with drops. Now taking 2 drops artifical tears and an unknown type of eye drop. Was told this is from her crying from grief. Follow-up appointment to see eye doctor set up.    GERD:   Resolved on Zantac. No symptoms of nausea, vomiting, abdominal/epigastric pain, melena, hematochezia.     Patient is an established patient of this clinic.    A [employer]  was used for this " visit  ---------------------------------------------------------------------------------------------------------------  Problem list and histories reviewed & adjusted, as indicated.    Patient Active Problem List   Diagnosis     Major depression     Esophageal reflux     Generalized osteoarthrosis, unspecified site     Hyperlipidemia     Osteoporosis     Prediabetes     Past Surgical History:   Procedure Laterality Date     CHOLECYSTECTOMY         Social History   Substance Use Topics     Smoking status: Never Smoker     Smokeless tobacco: Never Used     Alcohol use No     Family History   Problem Relation Age of Onset     DIABETES No family hx of      Coronary Artery Disease No family hx of      Breast Cancer No family hx of      Cancer - colorectal No family hx of      Ovarian Cancer No family hx of      Prostate Cancer No family hx of      Hypertension No family hx of      Other Cancer No family hx of      Mental Illness No family hx of      CEREBROVASCULAR DISEASE No family hx of      Anesthesia Reaction No family hx of      Asthma No family hx of      OSTEOPOROSIS No family hx of      Known Genetic Syndrome No family hx of      Obesity No family hx of      Unknown/Adopted No family hx of          Current Outpatient Prescriptions   Medication Sig Dispense Refill     buPROPion (WELLBUTRIN XL) 300 MG 24 hr tablet Take 1 tablet (300 mg) by mouth every morning 30 tablet 1     naproxen (NAPROSYN) 500 MG tablet Take 1 tablet (500 mg) by mouth daily as needed for moderate pain 30 tablet 1     alendronate (FOSAMAX) 70 MG tablet Take 1 tablet (70 mg) by mouth every 7 days Take 60 minutes before am meal with 8 oz. water. Remain upright for 30 minutes. 4 tablet 5     ranitidine (ZANTAC) 150 MG tablet Take 1 tablet (150 mg) by mouth 2 times daily 60 tablet 11     glycerin-hypromellose- (ARTIFICIAL TEARS) 0.2-0.2-1 % SOLN ophthalmic solution Place 1 drop into both eyes 2 times daily 1 Bottle 3     simvastatin (ZOCOR)  "20 MG tablet Take 1 tablet (20 mg) by mouth daily 30 tablet 3     Nutritional Supplements (ENSURE HIGH PROTEIN) Take 1 Can by mouth 2 times daily 60 Can 11     acetaminophen (TYLENOL) 500 MG tablet Take 1 tablet (500 mg) by mouth every 4 hours as needed for pain 30 tablet 11     Allergies   Allergen Reactions     Nka [No Known Allergies]      ----------------------------------------------------------------------------------------------  ROS:  Constitutional, HEENT, cardiovascular, pulmonary, gi and gu systems are negative, except as otherwise noted.    OBJECTIVE:     Pulse (!) 49  Temp 97.8  F (36.6  C) (Oral)  Ht 4' 6.8\" (139.2 cm)  Wt 101 lb (45.8 kg)  SpO2 99%  BMI 23.65 kg/m2  Body mass index is 23.65 kg/(m^2).  General: Appears well and in no acute distress.  Psych: Alert and oriented to person, place, and time. Able to articulate logical thoughts. Mood is fair. Affect matches mood.  Cardiovascular: Heart rate of 65. Regular rate and rhythm, normal S1 and S2 without murmur. No extra heartsounds or friction rub. Radial pulses present and equal bilaterally.  Respiratory: Lungs clear to auscultation bilaterally. No wheezing or crackles. No prolonged expiration. Symmetrical chest rise.    Diagnostic Test Results:  none     ASSESSMENT/PLAN:     1. Moderate episode of recurrent major depressive disorder (H): Worsening PHQ-9, though patient feels nothing has changed. No SI. Patient is having difficult time without therapist. Will give referral to establish with new provider. Additionally, having sleep difficulty. Discussed taking Wellbutrin in the morning rather than at night to help with sleep. No medication changes today.  - MENTAL HEALTH REFERRAL  -    2. Gastroesophageal reflux disease, esophagitis presence not specified:   - Resolved with Zantac. Continue. If worsens consider discussion to d/c naproxen and/or fosamax.    3. Dry eyes: Encouraged follow-up with ophthalmology.    4. Screening for condition: Due " for screening. Declines colonoscopy. Will call with results.  - Fecal Occult Blood ([facility])    Schedule follow-up appointment in 1 month.    Luis Alberto Maya MD  [facility] Resident  Pager# [phone]    Precepted with: Preethi Perez MD      Options for treatment and follow-up care were reviewed with the patient and/or guardian. See You and/or guardian engaged in the decision making process and verbalized understanding of the options discussed and agreed with the final plan    This chart is completed utilizing dictation software; typos and/or incorrect word substitutions may unintentionally occur.

## 2018-03-21 NOTE — PROGRESS NOTES
Preceptor Attestation:  Patient's case reviewed and discussed with  Patient seen and discussed with the resident.  I agree with written assessment and plan of care.  Supervising Physician:  Preethi Perez MD  [facility]

## 2018-03-22 ENCOUNTER — DOCUMENTATION ONLY (OUTPATIENT)
Dept: FAMILY MEDICINE | Facility: CLINIC | Age: 73
End: 2018-03-22

## 2018-03-22 ASSESSMENT — PATIENT HEALTH QUESTIONNAIRE - PHQ9: SUM OF ALL RESPONSES TO PHQ QUESTIONS 1-9: 12

## 2018-03-22 NOTE — PROGRESS NOTES
Referral for (Test): Psychology   Location/Place/Provider: [facility] [address], [place] [address]   Date/Time:    Phone: [phone]  Fax:   Additional information/prep.: I faxed over the referral to [facility], once they receive it, they will register the pt and call to schedule an appointment for the pt.   Scheduled by: JESUS Galan

## 2018-03-22 NOTE — PROGRESS NOTES
Procedure Requested        9035     MENTAL HEALTH REFERRAL  -             [#[id number]]         Priority: Routine  Class: External referral         Comment:Use this form for behavioral health consults and assessments. The                  referral coordinator will help to determine whether patients are                  best served by clinic behavioral health staff or by community                  providers.                                    Type of referral(s) requested (indicate all that apply):                  Adult Psychotherapy--for diagnosis and non-pharmacological                   treatment                                    Reason for referral: previous therapist left because of personal                   interest. Depression related to loss of  50 years ago                                    Currently receiving mental health services (if 'Yes', what                   services and why today's referral?): Yes: but not available anymore                  Currently having suicidal thoughts: No                  Previous psych hospitalization: No                                    Please provide data for below screening tools if available.                   PHQ-9 Score: 12                                                       needed: Yes                  Language: Hmong       Associated Diagnoses         F33.1 Moderate episode of recurrent major depressive disorder (H)         Adult or Child/Adolescent:  Adult               Location:  [facility]                     [place]                   [id number]               : 1945  F      [address]                           PCP: EUNICE, DO*     [place] [address]                                CTR: [facility]

## 2018-04-12 DIAGNOSIS — F33.1 MODERATE EPISODE OF RECURRENT MAJOR DEPRESSIVE DISORDER (H): ICD-10-CM

## 2018-04-12 RX ORDER — BUPROPION HYDROCHLORIDE 300 MG/1
300 TABLET ORAL EVERY MORNING
Qty: 30 TABLET | Refills: 3 | Status: SHIPPED | OUTPATIENT
Start: 2018-04-12 | End: 2018-07-27

## 2018-05-25 ENCOUNTER — DOCUMENTATION ONLY (OUTPATIENT)
Dept: FAMILY MEDICINE | Facility: CLINIC | Age: 73
End: 2018-05-25

## 2018-06-04 NOTE — PROGRESS NOTES
Visit to the client's home for annual health risk assessment and PCA assessment.  An  was present.    Current situation/living environment  See is a 72 year old female who lives alone in a high rise building. Today we met at her son's home, as his wife is her RP ([employer] for PCA), and had to be present. She lived with them last year and they are supportive to her needs.    Activities of daily living (ADL)/instrumental activities of daily living (IADL) and functional issues  Client needs help with the following ADL's: dressing (making sure she is wearing appropriate clothing), grooming and bathing  Client needs help with the following IADL's: shopping, cooking, housekeeping, laundry, managing finances/bills and transportation  Client states she is unable to perform the above due to pain, limited ROM in her extremities and depression      Health concerns for today  No concerns today. Last clinic visit was in March for Depression.  Has patient fallen 2 or more times in the last year? No  Has patient fallen with injury in the last year? No    Cognition/mental health  She has an RP to help her make decisions. She also is very tearful during our visit. She was sitting on the floor, tucked behind the side of a couch. She did scoot out a little as we spoke. Seems fearful and guarded. Has been going to a [employer] therapist.     STARS/Med Adherence  Client is non-compliant with the following quality measures: NA   Comments: Has had mammogram this year, and FIT test as well.    Client's Plan of Care consists of:  Adult day care (4 days/week but is asking for one more day), Life line, Personal care assistance (PCA) (1.25 hours per day) and Transportation. She was assessed for 1.75hrs/day but is choosing to give up some of her PCA hours to go to the [facility] one more day.    Preethi Monterroso, ROBBIE  [phone]

## 2018-06-19 DIAGNOSIS — E44.1 MILD MALNUTRITION (H): ICD-10-CM

## 2018-06-19 RX ORDER — FEEDER CONTAINER WITH PUMP SET
1 EACH MISCELLANEOUS 2 TIMES DAILY
Qty: 60 CAN | Refills: 11 | Status: SHIPPED | OUTPATIENT
Start: 2018-06-19 | End: 2018-07-27

## 2018-07-02 DIAGNOSIS — M81.0 AGE RELATED OSTEOPOROSIS, UNSPECIFIED PATHOLOGICAL FRACTURE PRESENCE: ICD-10-CM

## 2018-07-02 RX ORDER — ALENDRONATE SODIUM 70 MG/1
70 TABLET ORAL
Qty: 4 TABLET | Refills: 5 | Status: SHIPPED | OUTPATIENT
Start: 2018-07-02 | End: 2019-01-23

## 2018-07-27 ENCOUNTER — OFFICE VISIT (OUTPATIENT)
Dept: FAMILY MEDICINE | Facility: CLINIC | Age: 73
End: 2018-07-27
Payer: COMMERCIAL

## 2018-07-27 VITALS
WEIGHT: 101.2 LBS | HEIGHT: 55 IN | DIASTOLIC BLOOD PRESSURE: 76 MMHG | OXYGEN SATURATION: 97 % | SYSTOLIC BLOOD PRESSURE: 114 MMHG | HEART RATE: 59 BPM | BODY MASS INDEX: 23.42 KG/M2 | TEMPERATURE: 98.4 F

## 2018-07-27 DIAGNOSIS — E78.00 HYPERCHOLESTEREMIA: ICD-10-CM

## 2018-07-27 DIAGNOSIS — F33.1 MODERATE EPISODE OF RECURRENT MAJOR DEPRESSIVE DISORDER (H): ICD-10-CM

## 2018-07-27 DIAGNOSIS — M15.0 PRIMARY OSTEOARTHRITIS INVOLVING MULTIPLE JOINTS: ICD-10-CM

## 2018-07-27 DIAGNOSIS — Z23 NEED FOR TDAP VACCINATION: ICD-10-CM

## 2018-07-27 DIAGNOSIS — E44.1 MILD MALNUTRITION (H): ICD-10-CM

## 2018-07-27 DIAGNOSIS — Z23 NEED FOR VACCINATION WITH 13-POLYVALENT PNEUMOCOCCAL CONJUGATE VACCINE: ICD-10-CM

## 2018-07-27 DIAGNOSIS — K21.9 GASTROESOPHAGEAL REFLUX DISEASE, ESOPHAGITIS PRESENCE NOT SPECIFIED: Primary | ICD-10-CM

## 2018-07-27 RX ORDER — SIMVASTATIN 20 MG
20 TABLET ORAL DAILY
Qty: 30 TABLET | Refills: 3 | Status: SHIPPED | OUTPATIENT
Start: 2018-07-27 | End: 2018-07-27

## 2018-07-27 RX ORDER — FEEDER CONTAINER WITH PUMP SET
1 EACH MISCELLANEOUS 2 TIMES DAILY
Qty: 60 CAN | Refills: 11 | Status: SHIPPED | OUTPATIENT
Start: 2018-07-27 | End: 2019-08-05

## 2018-07-27 RX ORDER — ACETAMINOPHEN 500 MG
500 TABLET ORAL EVERY 4 HOURS PRN
Qty: 30 TABLET | Refills: 11 | Status: SHIPPED | OUTPATIENT
Start: 2018-07-27 | End: 2019-01-23

## 2018-07-27 NOTE — PATIENT INSTRUCTIONS
Important Takeaway Points From This Visit:    Do not take the pills with the x's on the bottles    I have refilled your other medications    You got your TDAP and PCV13 shots today.    See us again in 1 month, sooner if needed for your mood    Please bring your son and all your medications to your next visit.    Continue to see your therapist      As always, please call with any questions or concerns. I look forward to seeing you again soon!    Take care,  Dr. Maya    Your current medication list is printed. Please keep this with you - it is helpful to bring this current list to any other medical appointments. It can also be helpful if you ever go to the emergency room or hospital.    If you had lab testing today we will call you with the results. The phone number we will call with your results is # [phone] (home) . If this is not the best number please call our clinic and change the number.    If you need any refills, please call your pharmacy and they will contact us.    If you have any further concerns or wish to schedule another appointment, please call our office at [phone] during normal business hours (8-5, M-F).    If you have urgent medical questions that cannot wait, you may call [phone] at any time of day.    If you have a medical emergency, please call 122.    Thank you for coming to [facility].

## 2018-07-27 NOTE — PROGRESS NOTES
Preceptor Attestation:   Patient seen, evaluated and discussed with the resident. I have verified the content of the note, which accurately reflects my assessment of the patient and the plan of care.  Supervising Physician:Nancy Cook MD  [facility]

## 2018-07-27 NOTE — MR AVS SNAPSHOT
After Visit Summary   7/27/2018    See You    MRN: [id number]           Patient Information     Date Of Birth          1945        Visit Information        Provider Department      7/27/2018 8:20 AM Luis Alberto Maya MD [facility]        Today's Diagnoses     Gastroesophageal reflux disease, esophagitis presence not specified    -  1    Moderate episode of recurrent major depressive disorder (H)        Mild malnutrition (H)        Hypercholesteremia        Primary osteoarthritis involving multiple joints        Need for Tdap vaccination        Need for vaccination with 13-polyvalent pneumococcal conjugate vaccine          Care Instructions    Important Takeaway Points From This Visit:    Do not take the pills with the x's on the bottles    I have refilled your other medications    You got your TDAP and PCV13 shots today.    See us again in 1 month, sooner if needed for your mood    Please bring your son and all your medications to your next visit.    Continue to see your therapist      As always, please call with any questions or concerns. I look forward to seeing you again soon!    Take care,  Dr. Maya    Your current medication list is printed. Please keep this with you - it is helpful to bring this current list to any other medical appointments. It can also be helpful if you ever go to the emergency room or hospital.    If you had lab testing today we will call you with the results. The phone number we will call with your results is # [phone] (home) . If this is not the best number please call our clinic and change the number.    If you need any refills, please call your pharmacy and they will contact us.    If you have any further concerns or wish to schedule another appointment, please call our office at [phone] during normal business hours (8-5, M-F).    If you have urgent medical questions that cannot wait, you may call [phone] at any time of  "day.    If you have a medical emergency, please call 911.    Thank you for coming to [facility].              Follow-ups after your visit        Who to contact     Please call your clinic at [phone] to:    Ask questions about your health    Make or cancel appointments    Discuss your medicines    Learn about your test results    Speak to your doctor            Additional Information About Your Visit        Care EveryWhere ID     This is your Care EveryWhere ID. This could be used by other organizations to access your [facility] medical records  [id number]        Your Vitals Were     Pulse Temperature Height Pulse Oximetry BMI (Body Mass Index)       59 98.4  F (36.9  C) (Oral) 4' 6.72\" (139 cm) 97% 23.76 kg/m2        Blood Pressure from Last 3 Encounters:   07/27/18 114/76   02/16/18 128/69   09/22/17 123/71    Weight from Last 3 Encounters:   07/27/18 101 lb 3.2 oz (45.9 kg)   03/21/18 101 lb (45.8 kg)   02/16/18 99 lb (44.9 kg)              We Performed the Following     ADMIN VACCINE, EACH ADDITIONAL     ADMIN VACCINE, INITIAL     Pneumococcal vaccine 13 valent PCV13 IM (Prevnar) [59965]     TDAP VACCINE (BOOSTRIX)          Today's Medication Changes          These changes are accurate as of 7/27/18  9:42 AM.  If you have any questions, ask your nurse or doctor.               Start taking these medicines.        Dose/Directions    FLUoxetine 20 MG capsule   Commonly known as:  PROzac   Used for:  Moderate episode of recurrent major depressive disorder (H)   Started by:  Luis Alberto Maya MD        Dose:  20 mg   Take 1 capsule (20 mg) by mouth daily   Quantity:  30 capsule   Refills:  1       omeprazole 20 MG CR capsule   Commonly known as:  priLOSEC   Used for:  Gastroesophageal reflux disease, esophagitis presence not specified   Started by:  Luis Alberto Maya MD        Dose:  20 mg   Take 1 capsule (20 mg) by mouth daily   Quantity:  90 capsule   Refills:  0         Stop taking " these medicines if you haven't already. Please contact your care team if you have questions.     simvastatin 20 MG tablet   Commonly known as:  ZOCOR   Stopped by:  Luis Alberto Maya MD                Where to get your medicines      These medications were sent to [facility] - [place], [place] - [address]  [address], [place] [address]     Phone:  [phone]     acetaminophen 500 MG tablet    FLUoxetine 20 MG capsule    omeprazole 20 MG CR capsule         Some of these will need a paper prescription and others can be bought over the counter.  Ask your nurse if you have questions.     Bring a paper prescription for each of these medications     ENSURE HIGH PROTEIN                Primary Care Provider Office Phone # Fax #    Luis Alberto Maya [phone]       [facility] [address] [place] [address]        Equal Access to Services     [facility] : Hadii aad ku hadasho Soomaali, waaxda luqadaha, qaybta kaalmada adeegyada, waxay idiin hayaan adecynthia small . So [facility] [phone].    ATENCIÓN: Si habla español, tiene a bedoya disposición servicios gratuitos de asistencia lingüística. Llame al [phone].    We comply with applicable federal civil rights laws and [place] laws. We do not discriminate on the basis of race, color, national origin, age, disability, sex, sexual orientation, or gender identity.            Thank you!     Thank you for choosing [facility]  for your care. Our goal is always to provide you with excellent care. Hearing back from our patients is one way we can continue to improve our services. Please take a few minutes to complete the written survey that you may receive in the mail after your visit with us. Thank you!             Your Updated Medication List - Protect others around you: Learn how to safely use, store and throw away your medicines at www.disposemymeds.org.          This list is  accurate as of 7/27/18  9:42 AM.  Always use your most recent med list.                   Brand Name Dispense Instructions for use Diagnosis    acetaminophen 500 MG tablet    TYLENOL    30 tablet    Take 1 tablet (500 mg) by mouth every 4 hours as needed for pain    Primary osteoarthritis involving multiple joints       alendronate 70 MG tablet    FOSAMAX    4 tablet    Take 1 tablet (70 mg) by mouth every 7 days Take 60 minutes before am meal with 8 oz. water. Remain upright for 30 minutes.    Age related osteoporosis, unspecified pathological fracture presence       ENSURE HIGH PROTEIN     60 Can    Take 1 Can by mouth 2 times daily    Mild malnutrition (H)       FLUoxetine 20 MG capsule    PROzac    30 capsule    Take 1 capsule (20 mg) by mouth daily    Moderate episode of recurrent major depressive disorder (H)       omeprazole 20 MG CR capsule    priLOSEC    90 capsule    Take 1 capsule (20 mg) by mouth daily    Gastroesophageal reflux disease, esophagitis presence not specified

## 2018-07-27 NOTE — NURSING NOTE
"Chief Complaint   Patient presents with     Recheck Medication     Medication Reconciliation     needs attention but was reviewed. No longer taking Buproprion     Refill Request     Tylenol and Ensure, but would like print out of ensure.      Imm/Inj     Tdap and PCV13       /76  Pulse 59  Temp 98.4  F (36.9  C) (Oral)  Ht 4' 6.72\" (139 cm)  Wt 101 lb 3.2 oz (45.9 kg)  SpO2 97%  BMI 23.76 kg/m2       name: Nona Orta Her  Language: GRIN Publishingong  Agency: [email or web address]  Phone number: [phone]  Type of interpretation: Face to Face, Spoken  "

## 2018-07-27 NOTE — PROGRESS NOTES
"  SUBJECTIVE:                                                    See You is a 72 year old year old female who presents to clinic today for the following health issues:    Medication Review:    Would like Ensure refilled, but with paper print out. Weight is stable from last visit. Cost is an issue for her as it is not covered by insurance.    Has ranitidine, but no longer wants to take as it has not been helpful for her reflux.    Omeprazole greatly helps her reflux and only takes this as needed. Still has prescription left, but expires in August of 2018.    Does take fosamax weekly for osteoporosis.    She does not take Wellbutrin 300 XL as she does not find it helps with her mood. It also make her feel drowsy.    She has simvastatin and states she has been taking it; however, she has the original prescription bottle and has not been prescribed this for 1 year.    Her son sets up her medications (Isaías). Comes over every Monday's or Friday to set up pill box for the week.      She has a pill box.     Misses medications up to \"2 times a week\" because \"I forgot\".    States her son could come with he to a next visit.    Mood/Depression:    Going to Adult day care 5 times a week now that approved with social work help.     Has couple of friends there.    No SI.    Feels more stressed when at home alone by herself compared to around people at the .    Has some issues with memory such as turning off stove when done with it once a month.    Does her own cooking and daughter-in-law brings over food occasionally.    Does not want to discuss anymore about her life and is very tearful at this point    Feels safe at her building, it has a  and she states no one is abusing her.    Has new [facility] therapist and sees them next in August around the 8th she thinks.    Denies chest pain, shortness of breath, leg swelling, abdominal pain, rash. Is drowsy as stated above. No recent fevers, chills. Denies SI and has " depressed mood as above. Sleep is still an issue.    Son's Cell phone number is [phone]    Due for TDAP and PCV13.    Patient is an established patient of this clinic.    A [employer]  was used for this visit  -------------------------------------------------------------------------------------------------------------  Patient Active Problem List   Diagnosis     Major depression     Esophageal reflux     Generalized osteoarthrosis, unspecified site     Hyperlipidemia     Osteoporosis     Prediabetes     Past Surgical History:   Procedure Laterality Date     CHOLECYSTECTOMY         Social History   Substance Use Topics     Smoking status: Never Smoker     Smokeless tobacco: Never Used     Alcohol use No     Family History   Problem Relation Age of Onset     Diabetes No family hx of      Coronary Artery Disease No family hx of      Breast Cancer No family hx of      Cancer - colorectal No family hx of      Ovarian Cancer No family hx of      Prostate Cancer No family hx of      Hypertension No family hx of      Other Cancer No family hx of      Mental Illness No family hx of      Cerebrovascular Disease No family hx of      Anesthesia Reaction No family hx of      Asthma No family hx of      Osteoperosis No family hx of      Known Genetic Syndrome No family hx of      Obesity No family hx of      Unknown/Adopted No family hx of          Problem list and past medical, surgical, social, and family histories reviewed & adjusted, as indicated.    Current Outpatient Prescriptions   Medication Sig Dispense Refill     acetaminophen (TYLENOL) 500 MG tablet Take 1 tablet (500 mg) by mouth every 4 hours as needed for pain 30 tablet 11     alendronate (FOSAMAX) 70 MG tablet Take 1 tablet (70 mg) by mouth every 7 days Take 60 minutes before am meal with 8 oz. water. Remain upright for 30 minutes. 4 tablet 5     FLUoxetine (PROZAC) 20 MG capsule Take 1 capsule (20 mg) by mouth daily 30 capsule 1     Nutritional  "Supplements (ENSURE HIGH PROTEIN) Take 1 Can by mouth 2 times daily 60 Can 11     omeprazole (PRILOSEC) 20 MG CR capsule Take 1 capsule (20 mg) by mouth daily 90 capsule 0     Medication list reviewed and updated as indicated.    Allergies   Allergen Reactions     Nka [No Known Allergies]      Allergies reviewed and updated as indicated.  ------------------------------------------------------------------------------------------------------------  ROS:  Constitutional, HEENT, cardiovascular, pulmonary, GI, musculoskeletal, neuro, skin, and psych systems are negative, except as otherwise noted.    OBJECTIVE:     /76  Pulse 59  Temp 98.4  F (36.9  C) (Oral)  Ht 4' 6.72\" (139 cm)  Wt 101 lb 3.2 oz (45.9 kg)  SpO2 97%  BMI 23.76 kg/m2  Body mass index is 23.76 kg/(m^2).  General: Tearful at times. Appears otherwise well and in no acute distress.  Psych: Feels sad and trapped in her life. Alert and oriented to person, place, and time. Able to articulate logical thoughts. Affect matches mood.  Cardiovascular: Regular rate and rhythm, normal S1 and S2 without murmur. No extra heartsounds or friction rub. Radial pulses present and equal bilaterally.  Respiratory: Lungs clear to auscultation bilaterally. No wheezing or crackles. No prolonged expiration. Symmetrical chest rise.  GI/Rectal: Soft, non-tender abdomen. No hepatosplenomegaly. Normal active bowel sounds.  Musculoskeletal: No gross extremity deformities. No peripheral edema. Normal muscle bulk.    ASSESSMENT/PLAN:     1. Moderate episode of recurrent major depressive disorder (H): Tired wellbutrin and celexa in the past as well as abilify. Unsure about patient compliance. Patient is doing adult day care 5 days weekly and helping with socializing. Son and daughter-in-law help support, but she lives a lone. Suspect poor medication compliance given medications brought in today. Asked if son could come to next visit. F/u in 1 month. Discontinue wellbutrin and " start Prozac 20 mg daily. Continue with [facility] therapist. Call clinic or 911 if having SI or tell family, friend. Patient understanding, no current SI.  - FLUoxetine (PROZAC) 20 MG capsule; Take 1 capsule (20 mg) by mouth daily  Dispense: 30 capsule; Refill: 1    2. Mild malnutrition (H): Paper prescription given to patient for Ensure as desired.  - Nutritional Supplements (ENSURE HIGH PROTEIN); Take 1 Can by mouth 2 times daily  Dispense: 60 Can; Refill: 11    3. Gastroesophageal reflux disease, esophagitis presence not specified: Discontinued Zantac as this was not helpful for patient. Re-prescribe 90 day supply of omeprazole. No current symptoms and stable per patient.  - omeprazole (PRILOSEC) 20 MG CR capsule; Take 1 capsule (20 mg) by mouth daily  Dispense: 90 capsule; Refill: 0    4. Hypercholesteremia: Does not have DM or other co morbidities. Last cholesterol level in 2017 would not warrant therapy and patient likely is not taking anyway's. Discontinue simvastatin.    5. Primary osteoarthritis involving multiple joints: Refilled per patient request.  - acetaminophen (TYLENOL) 500 MG tablet; Take 1 tablet (500 mg) by mouth every 4 hours as needed for pain  Dispense: 30 tablet; Refill: 11    6. Need for Tdap vaccination: Give today for preventative health.  - TDAP VACCINE (BOOSTRIX)  - ADMIN VACCINE, INITIAL    7. Need for vaccination with 13-polyvalent pneumococcal conjugate vaccine: Give today for preventative health.  - Pneumococcal vaccine 13 valent PCV13 IM (Prevnar) [27267]  - ADMIN VACCINE, EACH ADDITIONAL    Schedule follow-up appointment in 1 month.      Medications Discontinued During This Encounter   Medication Reason     ranitidine (ZANTAC) 150 MG tablet Ineffective     buPROPion (WELLBUTRIN XL) 300 MG 24 hr tablet Stopped by Patient     naproxen (NAPROSYN) 500 MG tablet Medication Reconciliation Clean Up     glycerin-hypromellose- (ARTIFICIAL TEARS) 0.2-0.2-1 % SOLN ophthalmic solution  Medication Reconciliation Clean Up     Nutritional Supplements (ENSURE HIGH PROTEIN) Reorder     acetaminophen (TYLENOL) 500 MG tablet Reorder     simvastatin (ZOCOR) 20 MG tablet Stopped by MD Luis Alberto Maya MD  [facility] Resident  Pager# [phone]    Precepted with: Nancy Cook MD    Options for treatment and follow-up care were reviewed with the patient and/or guardian. See You and/or guardian engaged in the decision making process and verbalized understanding of the options discussed and agreed with the final plan    This chart is completed utilizing dictation software; typos and/or incorrect word substitutions may unintentionally occur.     The Following is for Coding Purposes Only    Dx Type # This visit   Self-Limited                  (1 pt ea) 0   Established, Stable      (1 pt ea) 3   Established, Worse      (2 pt ea) 1   New, Simple                 (3 pt ea) 0   New, Further Work-Up (4 pt ea) 0       Total Points 4+ - High       Data Reviewed    Decision to Obtain Records                 (1 pt) 0   Review/Summarize Old Records         (2 pt) 0   Order/Review Labs                              (1 pt) 0   Order/Review Radiology                      (1 pt) 0   Order/Review Medical Tests                (1 pt) 0   Independent Review of EKG/XRay (2 pt ea) 0       Total Points 0 - Straightforward       Risk    1       One Minor Problem No   Basic Labs / Imaging / EKG No   Supportive Cares Yes   2       2+ Minor / Stable Chronic / Simple Acute Problem   Yes   Unstressed Test (Biopsy, PFT's, etc) No   OTC Meds / PT/OT Yes   3       Complicated Acute / Worse Chronic / 2+ Stable / Undiagnosed  Yes   Stress Test and Endoscopies No   Prescription Drugs or Treatment of Closed Injury Yes   4       Life Threatening Condition No   Rx With Monitoring / De-Escalate Care For Poor Prognosis  No   Level 3

## 2018-07-28 ASSESSMENT — PATIENT HEALTH QUESTIONNAIRE - PHQ9: SUM OF ALL RESPONSES TO PHQ QUESTIONS 1-9: 7

## 2018-08-23 ENCOUNTER — OFFICE VISIT (OUTPATIENT)
Dept: FAMILY MEDICINE | Facility: CLINIC | Age: 73
End: 2018-08-23
Payer: COMMERCIAL

## 2018-08-23 VITALS
WEIGHT: 100 LBS | OXYGEN SATURATION: 97 % | BODY MASS INDEX: 23.14 KG/M2 | HEIGHT: 55 IN | SYSTOLIC BLOOD PRESSURE: 123 MMHG | DIASTOLIC BLOOD PRESSURE: 78 MMHG | TEMPERATURE: 98.2 F | RESPIRATION RATE: 16 BRPM | HEART RATE: 52 BPM

## 2018-08-23 DIAGNOSIS — F33.1 MODERATE EPISODE OF RECURRENT MAJOR DEPRESSIVE DISORDER (H): ICD-10-CM

## 2018-08-23 DIAGNOSIS — R73.03 PREDIABETES: Primary | ICD-10-CM

## 2018-08-23 LAB — HBA1C MFR BLD: 5.9 % (ref 4.1–5.7)

## 2018-08-23 NOTE — PROGRESS NOTES
"  SUBJECTIVE:                                                    See You is a 72 year old year old female who presents to clinic today for the following health issues:    Mood/Depression:    Going to Adult day care 5 times a week now that approved with social work help.     Since attending she states \"I don't have depression or stress anymore\" as a result of \"people starting to talk to me\".    She states she has couple of friends there.    Also thinks Prozac is helping, no side effects.    No SI.     Her son (Isaías) is present today and states he sets up her medications for her daily.    Additionally, her meetings with her [employer] speaking therapist are going well and she sees them again tomorrow.    She has no other concerns today    PHQ-9 score of 6 today.    PHQ-9 SCORE 2/16/2018 3/21/2018 7/27/2018   Total Score - - -   Total Score 3 12 7     Patient is an established patient of this clinic.    A [employer]  was used for this visit  -------------------------------------------------------------------------------------------------------------  Patient Active Problem List   Diagnosis     Major depression     Esophageal reflux     Generalized osteoarthrosis, unspecified site     Hyperlipidemia     Osteoporosis     Prediabetes     Past Surgical History:   Procedure Laterality Date     CHOLECYSTECTOMY         Social History   Substance Use Topics     Smoking status: Never Smoker     Smokeless tobacco: Never Used     Alcohol use No     Family History   Problem Relation Age of Onset     Diabetes No family hx of      Coronary Artery Disease No family hx of      Breast Cancer No family hx of      Cancer - colorectal No family hx of      Ovarian Cancer No family hx of      Prostate Cancer No family hx of      Hypertension No family hx of      Other Cancer No family hx of      Mental Illness No family hx of      Cerebrovascular Disease No family hx of      Anesthesia Reaction No family hx of      Asthma No family hx of " "     Osteoperosis No family hx of      Known Genetic Syndrome No family hx of      Obesity No family hx of      Unknown/Adopted No family hx of          Problem list and past medical, surgical, social, and family histories reviewed & adjusted, as indicated.    Current Outpatient Prescriptions   Medication Sig Dispense Refill     acetaminophen (TYLENOL) 500 MG tablet Take 1 tablet (500 mg) by mouth every 4 hours as needed for pain 30 tablet 11     alendronate (FOSAMAX) 70 MG tablet Take 1 tablet (70 mg) by mouth every 7 days Take 60 minutes before am meal with 8 oz. water. Remain upright for 30 minutes. 4 tablet 5     FLUoxetine (PROZAC) 20 MG capsule Take 1 capsule (20 mg) by mouth daily 30 capsule 1     omeprazole (PRILOSEC) 20 MG CR capsule Take 1 capsule (20 mg) by mouth daily (Patient taking differently: Take 20 mg by mouth daily as needed ) 90 capsule 0     Nutritional Supplements (ENSURE HIGH PROTEIN) Take 1 Can by mouth 2 times daily 60 Can 11     Medication list reviewed and updated as indicated.    Allergies   Allergen Reactions     Nka [No Known Allergies]      Allergies reviewed and updated as indicated.  ------------------------------------------------------------------------------------------------------------  ROS:  Constitutional, HEENT, cardiovascular, pulmonary, GI, musculoskeletal, neuro, skin, and psych systems are negative, except as otherwise noted.    OBJECTIVE:     /78  Pulse 52  Temp 98.2  F (36.8  C) (Oral)  Resp 16  Ht 4' 6.72\" (139 cm)  Wt 100 lb (45.4 kg)  SpO2 97%  BMI 23.48 kg/m2  Body mass index is 23.48 kg/(m^2).  General: Appears well and in no acute distress. Accompanied by son.  Psych: Feels good today. Alert and oriented to person, place, and time. Able to articulate logical thoughts. Affect matches mood.  Cardiovascular: Regular rate and rhythm, normal S1 and S2 without murmur. No extra heartsounds or friction rub. Radial pulses present and equal " bilaterally.  Respiratory: Lungs clear to auscultation bilaterally. No wheezing or crackles. No prolonged expiration. Symmetrical chest rise.  Musculoskeletal: No gross extremity deformities. No peripheral edema. Normal muscle bulk.    A1c toay of 5.9    ASSESSMENT/PLAN:     1. Moderate episode of recurrent major depressive disorder (H): Much improved with Prozac and social situation attending adult day care. Will not make any medication changes today as she is greatly improved. No SI. Recommend continuing to see therapist as well. . Note: has tired wellbutrin and celexa in the past as well as abilify without improvement. Follow-up in 3 months.    2. Prediabetes: History of prediabetes with A1c of 6.1 over a year ago. Recheck today looking for progression to diabetes. Improved to 5.9. Recheck in 1 year.  - Hemoglobin A1c ([facility])    Schedule follow-up appointment in 3 months.    Luis Alberto Maya MD  [facility] Resident  Pager# [phone]    Precepted with: Dr. Dalila MD    Options for treatment and follow-up care were reviewed with the patient and/or guardian. See You and/or guardian engaged in the decision making process and verbalized understanding of the options discussed and agreed with the final plan    This chart is completed utilizing dictation software; typos and/or incorrect word substitutions may unintentionally occur.     The Following is for Coding Purposes Only    Dx Type # This visit   Self-Limited                  (1 pt ea) 0   Established, Stable      (1 pt ea) 2   Established, Worse      (2 pt ea) 0   New, Simple                 (3 pt ea) 0   New, Further Work-Up (4 pt ea) 0       Total Points 2- low       Data Reviewed    Decision to Obtain Records                 (1 pt) 0   Review/Summarize Old Records         (2 pt) 0   Order/Review Labs                              (1 pt) 1   Order/Review Radiology                      (1 pt) 0   Order/Review Medical Tests                (1  pt) 0   Independent Review of EKG/XRay (2 pt ea) 0       Total Points 1 - Straightforward       Risk    1       One Minor Problem No   Basic Labs / Imaging / EKG Yes   Supportive Cares Yes   2       2+ Minor / Stable Chronic / Simple Acute Problem   No   Unstressed Test (Biopsy, PFT's, etc) No   OTC Meds / PT/OT No   3       Complicated Acute / Worse Chronic / 2+ Stable / Undiagnosed  Yes   Stress Test and Endoscopies No   Prescription Drugs or Treatment of Closed Injury No   4       Life Threatening Condition No   Rx With Monitoring / De-Escalate Care For Poor Prognosis  No   Level 3

## 2018-08-23 NOTE — NURSING NOTE
Due to patient being non-English speaking/uses sign language, an  was used for this visit. Only for face-to-face interpretation by an external agency, date and length of interpretation can be found on the scanned worksheet.     name: Sherron  Agency: [employer]  Language: Hmong   Telephone number: [phone]  Type of interpretation: Face-to-face, spoken

## 2018-08-23 NOTE — MR AVS SNAPSHOT
"              After Visit Summary   8/23/2018    See You    MRN: [id number]           Patient Information     Date Of Birth          1945        Visit Information        Provider Department      8/23/2018 9:00 AM Luis Alberto Maya MD [facility]        Today's Diagnoses     Prediabetes    -  1    Moderate episode of recurrent major depressive disorder (H)           Follow-ups after your visit        Follow-up notes from your care team     Return in about 3 months (around 11/23/2018).      Who to contact     Please call your clinic at [phone] to:    Ask questions about your health    Make or cancel appointments    Discuss your medicines    Learn about your test results    Speak to your doctor            Additional Information About Your Visit        Care EveryWhere ID     This is your Care EveryWhere ID. This could be used by other organizations to access your [place] medical records  [id number]        Your Vitals Were     Pulse Temperature Respirations Height Pulse Oximetry BMI (Body Mass Index)    52 98.2  F (36.8  C) (Oral) 16 4' 6.72\" (139 cm) 97% 23.48 kg/m2       Blood Pressure from Last 3 Encounters:   08/23/18 123/78   07/27/18 114/76   02/16/18 128/69    Weight from Last 3 Encounters:   08/23/18 100 lb (45.4 kg)   07/27/18 101 lb 3.2 oz (45.9 kg)   03/21/18 101 lb (45.8 kg)              We Performed the Following     Hemoglobin A1c ([facility])          Today's Medication Changes          These changes are accurate as of 8/23/18 11:59 PM.  If you have any questions, ask your nurse or doctor.               These medicines have changed or have updated prescriptions.        Dose/Directions    omeprazole 20 MG CR capsule   Commonly known as:  priLOSEC   This may have changed:    - when to take this  - reasons to take this   Used for:  Gastroesophageal reflux disease, esophagitis presence not specified        Dose:  20 mg   Take 1 capsule (20 mg) by mouth daily   Quantity:  90 capsule "   Refills:  0                Primary Care Provider Office Phone # Fax #    Luis Alberto Maya [phone]       [facility] [address] [place] [address]        Equal Access to Services     BANDAR MENDOZA : Hadii cierra ku hadkassandrao Soomaali, waaxda luqadaha, qaybta kaalmada adeegyada, grover donin hayyoanan tanner brandon laLisamichelle hale. So [facility] [phone].    ATENCIÓN: Si habla español, tiene a bedoya disposición servicios gratuitos de asistencia lingüística. Llame al [phone].    We comply with applicable federal civil rights laws and [place] laws. We do not discriminate on the basis of race, color, national origin, age, disability, sex, sexual orientation, or gender identity.            Thank you!     Thank you for choosing [facility]  for your care. Our goal is always to provide you with excellent care. Hearing back from our patients is one way we can continue to improve our services. Please take a few minutes to complete the written survey that you may receive in the mail after your visit with us. Thank you!             Your Updated Medication List - Protect others around you: Learn how to safely use, store and throw away your medicines at www.disposemymeds.org.          This list is accurate as of 8/23/18 11:59 PM.  Always use your most recent med list.                   Brand Name Dispense Instructions for use Diagnosis    acetaminophen 500 MG tablet    TYLENOL    30 tablet    Take 1 tablet (500 mg) by mouth every 4 hours as needed for pain    Primary osteoarthritis involving multiple joints       alendronate 70 MG tablet    FOSAMAX    4 tablet    Take 1 tablet (70 mg) by mouth every 7 days Take 60 minutes before am meal with 8 oz. water. Remain upright for 30 minutes.    Age related osteoporosis, unspecified pathological fracture presence       ENSURE HIGH PROTEIN     60 Can    Take 1 Can by mouth 2 times daily    Mild malnutrition (H)       FLUoxetine 20 MG capsule     PROzac    30 capsule    Take 1 capsule (20 mg) by mouth daily    Moderate episode of recurrent major depressive disorder (H)       omeprazole 20 MG CR capsule    priLOSEC    90 capsule    Take 1 capsule (20 mg) by mouth daily    Gastroesophageal reflux disease, esophagitis presence not specified

## 2018-08-30 NOTE — PROGRESS NOTES
I have personally reviewed the history and examination as documented by Dr. Maya.  I was present during key portions of the visit and agree with the assessment and plan as documented for 72 yr old female with depression, pre-DM here for follow-up. Mood improved on serotonin specific reuptake inhibitor and w/ adult day program.. Precautions given. Anticipatory guidance given.     Alvaro Conner MD  August 30, 2018  2:22 PM

## 2018-09-20 DIAGNOSIS — F33.1 MODERATE EPISODE OF RECURRENT MAJOR DEPRESSIVE DISORDER (H): ICD-10-CM

## 2018-09-20 NOTE — TELEPHONE ENCOUNTER
Message to physician:     Date of last visit: 8/23/2018     Date of next visit if scheduled: Visit date not found       Last Comprehensive Metabolic Panel:  Sodium   Date Value Ref Range Status   02/16/2018 141.0 133.0 - 144.0 mmol/L Final     Potassium   Date Value Ref Range Status   02/16/2018 3.6 3.4 - 5.3 mmol/L Final     Chloride   Date Value Ref Range Status   02/16/2018 105.0 94.0 - 109.0 mmol/L Final     Carbon Dioxide   Date Value Ref Range Status   02/16/2018 25.0 20.0 - 32.0 mmol/L Final     Glucose   Date Value Ref Range Status   02/16/2018 106.0 60.0 - 109.0 mg/dL Final     Urea Nitrogen   Date Value Ref Range Status   02/16/2018 17.0 7.0 - 30.0 mg/dL Final     Creatinine   Date Value Ref Range Status   02/16/2018 0.6 0.6 - 1.3 mg/dL Final     GFR Estimate   Date Value Ref Range Status   07/08/2014 >60 >60 mL/min/1.73m2 Final     Calcium   Date Value Ref Range Status   02/16/2018 9.4 8.5 - 10.4 mg/dL Final       BP Readings from Last 3 Encounters:   08/23/18 123/78   07/27/18 114/76   02/16/18 128/69       Lab Results   Component Value Date    A1C 5.9 08/23/2018    A1C 6.1 06/29/2017                Please complete refill and CLOSE ENCOUNTER.  Closing the encounter signifies the refill is complete.

## 2018-11-05 ENCOUNTER — COMMUNICATION - HEALTHEAST (OUTPATIENT)
Dept: UROLOGY | Facility: CLINIC | Age: 73
End: 2018-11-05

## 2018-11-07 ENCOUNTER — HOSPITAL ENCOUNTER (OUTPATIENT)
Dept: RADIOLOGY | Facility: CLINIC | Age: 73
Discharge: HOME | End: 2018-11-07
Attending: UROLOGY

## 2018-11-07 ENCOUNTER — OFFICE VISIT - HEALTHEAST (OUTPATIENT)
Dept: UROLOGY | Facility: CLINIC | Age: 73
End: 2018-11-07

## 2018-11-07 DIAGNOSIS — N20.9 URINARY TRACT STONES: ICD-10-CM

## 2018-11-07 LAB
ALBUMIN UR-MCNC: NEGATIVE MG/DL
APPEARANCE UR: CLEAR
BILIRUB UR QL STRIP: NEGATIVE
COLOR UR AUTO: YELLOW
GLUCOSE UR STRIP-MCNC: ABNORMAL MG/DL
HGB UR QL STRIP: ABNORMAL
KETONES UR STRIP-MCNC: NEGATIVE MG/DL
LEUKOCYTE ESTERASE UR QL STRIP: ABNORMAL
NITRATE UR QL: NEGATIVE
PH UR STRIP: 5.5 [PH] (ref 5–8)
SP GR UR STRIP: 1.02 (ref 1–1.03)
UROBILINOGEN UR STRIP-ACNC: ABNORMAL

## 2018-11-14 ENCOUNTER — OFFICE VISIT - HEALTHEAST (OUTPATIENT)
Dept: UROLOGY | Facility: CLINIC | Age: 73
End: 2018-11-14

## 2018-11-14 ENCOUNTER — HOSPITAL ENCOUNTER (OUTPATIENT)
Dept: RADIOLOGY | Facility: CLINIC | Age: 73
Discharge: HOME | End: 2018-11-14
Attending: UROLOGY

## 2018-11-14 DIAGNOSIS — N20.1 CALCULUS OF URETER: ICD-10-CM

## 2018-11-14 DIAGNOSIS — N20.9 URINARY TRACT STONES: ICD-10-CM

## 2018-11-14 DIAGNOSIS — N20.0 CALCULUS OF KIDNEY: ICD-10-CM

## 2018-11-14 LAB
ALBUMIN UR-MCNC: NEGATIVE MG/DL
APPEARANCE UR: CLEAR
BILIRUB UR QL STRIP: NEGATIVE
COLOR UR AUTO: YELLOW
GLUCOSE UR STRIP-MCNC: NEGATIVE MG/DL
HGB UR QL STRIP: ABNORMAL
KETONES UR STRIP-MCNC: NEGATIVE MG/DL
LEUKOCYTE ESTERASE UR QL STRIP: NEGATIVE
NITRATE UR QL: NEGATIVE
PH UR STRIP: 5 [PH] (ref 5–8)
SP GR UR STRIP: 1.02 (ref 1–1.03)
UROBILINOGEN UR STRIP-ACNC: ABNORMAL

## 2018-11-16 LAB
1ST CONSTITUENT:: NORMAL
SOURCE: NORMAL

## 2018-11-28 ENCOUNTER — OFFICE VISIT (OUTPATIENT)
Dept: FAMILY MEDICINE | Facility: CLINIC | Age: 73
End: 2018-11-28
Payer: COMMERCIAL

## 2018-11-28 ENCOUNTER — AMBULATORY - HEALTHEAST (OUTPATIENT)
Dept: LAB | Facility: HOSPITAL | Age: 73
End: 2018-11-28

## 2018-11-28 VITALS
HEIGHT: 55 IN | WEIGHT: 97.4 LBS | TEMPERATURE: 98 F | OXYGEN SATURATION: 99 % | RESPIRATION RATE: 20 BRPM | HEART RATE: 57 BPM | BODY MASS INDEX: 22.54 KG/M2 | SYSTOLIC BLOOD PRESSURE: 115 MMHG | DIASTOLIC BLOOD PRESSURE: 71 MMHG

## 2018-11-28 DIAGNOSIS — Z00.00 ROUTINE GENERAL MEDICAL EXAMINATION AT A HEALTH CARE FACILITY: Primary | ICD-10-CM

## 2018-11-28 DIAGNOSIS — N20.1 CALCULUS OF URETER: ICD-10-CM

## 2018-11-28 NOTE — MR AVS SNAPSHOT
After Visit Summary   11/28/2018    See You    MRN: [id number]           Patient Information     Date Of Birth          1945        Visit Information        Provider Department      11/28/2018 9:20 AM Luis Alberto Maya MD [facility]        Today's Diagnoses     Routine general medical examination at a health care facility    -  1      Care Instructions      Preventive Health Recommendations    See your health care provider every year to    Review health changes.     Discuss preventive care.      Review your medicines if your doctor has prescribed any.      You no longer need a yearly Pap test unless you've had an abnormal Pap test in the past 10 years. If you have vaginal symptoms, such as bleeding or discharge, be sure to talk with your provider about a Pap test.      Every 1 to 2 years, have a mammogram.  If you are over 69, talk with your health care provider about whether or not you want to continue having screening mammograms.      Every 10 years, have a colonoscopy. Or, have a yearly FIT test (stool test). These exams will check for colon cancer.       Have a cholesterol test every 5 years, or more often if your doctor advises it.       Have a diabetes test (fasting glucose) every three years. If you are at risk for diabetes, you should have this test more often.       At age 65, have a bone density scan (DEXA) to check for osteoporosis (brittle bone disease).    Shots:    Get a flu shot each year.    Get a tetanus shot every 10 years.    Talk to your doctor about your pneumonia vaccines. There are now two you should receive - Pneumovax (PPSV 23) and Prevnar (PCV 13).    Talk to your pharmacist about the shingles vaccine.    Talk to your doctor about the hepatitis B vaccine.    Nutrition:     Eat at least 5 servings of fruits and vegetables each day.      Eat whole-grain bread, whole-wheat pasta and brown rice instead of white grains and rice.      Get adequate Calcium and  "Vitamin D.     Lifestyle    Exercise at least 150 minutes a week (30 minutes a day, 5 days a week). This will help you control your weight and prevent disease.      Limit alcohol to one drink per day.      No smoking.       Wear sunscreen to prevent skin cancer.       See your dentist twice a year for an exam and cleaning.      See your eye doctor every 1 to 2 years to screen for conditions such as glaucoma, macular degeneration and cataracts.    Personalized Prevention Plan  You are due for the preventive services outlined below.  Your care team is available to assist you in scheduling these services.  If you have already completed any of these items, please share that information with your care team to update in your medical record.  Health Maintenance Due   Topic Date Due     Wellness Visit with your Primary Provider - yearly  1945     Depression Action Plan Review  10/10/1965     Flu Vaccine (1) 09/01/2018     FALL RISK ASSESSMENT  09/22/2018             Follow-ups after your visit        Who to contact     Please call your clinic at [phone] to:    Ask questions about your health    Make or cancel appointments    Discuss your medicines    Learn about your test results    Speak to your doctor            Additional Information About Your Visit        Care EveryWhere ID     This is your Care EveryWhere ID. This could be used by other organizations to access your [place] medical records  [id number]        Your Vitals Were     Pulse Temperature Respirations Height Pulse Oximetry BMI (Body Mass Index)    57 98  F (36.7  C) (Oral) 20 4' 6.33\" (138 cm) 99% 23.2 kg/m2       Blood Pressure from Last 3 Encounters:   11/28/18 115/71   08/23/18 123/78   07/27/18 114/76    Weight from Last 3 Encounters:   11/28/18 97 lb 6.4 oz (44.2 kg)   08/23/18 100 lb (45.4 kg)   07/27/18 101 lb 3.2 oz (45.9 kg)              Today, you had the following     No orders found for display         Today's Medication Changes        "   These changes are accurate as of 11/28/18 11:59 PM.  If you have any questions, ask your nurse or doctor.               These medicines have changed or have updated prescriptions.        Dose/Directions    omeprazole 20 MG DR capsule   Commonly known as:  priLOSEC   This may have changed:    - when to take this  - reasons to take this   Used for:  Gastroesophageal reflux disease, esophagitis presence not specified        Dose:  20 mg   Take 1 capsule (20 mg) by mouth daily   Quantity:  90 capsule   Refills:  0                Primary Care Provider Office Phone # Fax #    Luis Alberto Maya [phone]       [facility] [address]        Equal Access to Services     BANDAR MENDOZA : Hadii cierra Manuel, waaxda luopaladaha, qaybta kaalmada chanelle, grover hale. So [facility] [phone].    ATENCIÓN: Si habla español, tiene a bedoya disposición servicios gratuitos de asistencia lingüística. Llame al [phone].    We comply with applicable federal civil rights laws and [place] laws. We do not discriminate on the basis of race, color, national origin, age, disability, sex, sexual orientation, or gender identity.            Thank you!     Thank you for choosing [facility]  for your care. Our goal is always to provide you with excellent care. Hearing back from our patients is one way we can continue to improve our services. Please take a few minutes to complete the written survey that you may receive in the mail after your visit with us. Thank you!             Your Updated Medication List - Protect others around you: Learn how to safely use, store and throw away your medicines at www.disposemymeds.org.          This list is accurate as of 11/28/18 11:59 PM.  Always use your most recent med list.                   Brand Name Dispense Instructions for use Diagnosis    acetaminophen 500 MG tablet    TYLENOL    30 tablet    Take 1 tablet (500  mg) by mouth every 4 hours as needed for pain    Primary osteoarthritis involving multiple joints       alendronate 70 MG tablet    FOSAMAX    4 tablet    Take 1 tablet (70 mg) by mouth every 7 days Take 60 minutes before am meal with 8 oz. water. Remain upright for 30 minutes.    Age related osteoporosis, unspecified pathological fracture presence       ENSURE HIGH PROTEIN     60 Can    Take 1 Can by mouth 2 times daily    Mild malnutrition (H)       FLUoxetine 20 MG capsule    PROzac    90 capsule    Take 1 capsule (20 mg) by mouth daily    Moderate episode of recurrent major depressive disorder (H)       omeprazole 20 MG DR capsule    priLOSEC    90 capsule    Take 1 capsule (20 mg) by mouth daily    Gastroesophageal reflux disease, esophagitis presence not specified

## 2018-11-28 NOTE — PATIENT INSTRUCTIONS

## 2018-11-28 NOTE — PROGRESS NOTES
Annual Wellness Visit         HPI     This 73 year old female presents as an established patient of myself who presents for an subsequent Medicare Wellness Exam. No specific concerns today.    Chart review showed she went to the ER on 11/4/18 for nephrolithiasis and has been following-up with the [facility].    A [employer]  was used for this visit.     Patient Active Problem List   Diagnosis     Major depression     Esophageal reflux     Generalized osteoarthrosis, unspecified site     Hyperlipidemia     Osteoporosis     Prediabetes     Past Medical History:   Diagnosis Date     Hyperlipidemia      Osteoarthritis       Family History   Problem Relation Age of Onset     Diabetes No family hx of      Coronary Artery Disease No family hx of      Breast Cancer No family hx of      Cancer - colorectal No family hx of      Ovarian Cancer No family hx of      Prostate Cancer No family hx of      Hypertension No family hx of      Other Cancer No family hx of      Mental Illness No family hx of      Cerebrovascular Disease No family hx of      Anesthesia Reaction No family hx of      Asthma No family hx of      Osteoporosis No family hx of      Known Genetic Syndrome No family hx of      Obesity No family hx of      Unknown/Adopted No family hx of        Past Surgical History:   Procedure Laterality Date     CHOLECYSTECTOMY       Reviewed no other significant FH    Family History and past Medical History reviewed and it is unchanged/updated.       Review of Systems     Constitutional:   fevers, night sweats or unintentional weight change 10/13/18?  NO      Eyes:   vision change, diplopia or red eyes?  NO, saw eye doctor 10/13/18. Records reviewed, has cataracts.       Ears, Nose, Mouth, Throat:   tinnitus or hearing change,  epistaxis or nasal discharge,  oral lesions, throat pain ?  NO      Neck:   stiffness?  NO           Cardiovascular:   chest pain, palpitations, or pain with walking, orthopnea or  PND?  NO   Breasts:  Any bumps or unusual discharge?     NO         Respiratory:   dyspnea, cough, shortness of breath or wheezing?  NO         GI:   nausea, vomiting, diarrhea or constipation,  abdominal pain ?  NO         :   change in urine,  dysuria or hematuria,  sexual dysfunction ?  NO        Musculoskeletal:   joint or muscle pain or swelling?  NO            Skin:   concerning lesions or moles?  NO           Nervous System:   loss of strength or sensation,  numbness or tingling,  tremor,  dizziness,  headache?  NO   Endocrine/Homone:   polyuria or polydipsia,  temperature intolerance?  NO            Blood and Lymphnodes:   concerning bumps,  bleeding problems?  NO            Allergy:   environmental allergies?  NO            Mental Health:   depression or anxiety,  sleep problems?  Yes, currently controlled on medication, therapy, and adult                Medical Care     Have you been to an ER or a hospital in the last year? Yes, Kidney stone visit on November 4th, 2018  What other specialists or organizations are involved in your medical care?  Opthalmology (cataracts) and Urology (kidney stones) and therapist (depression)  Current providers sharing in care for this patient include:  Patient Care Team:  Luis Alberto aMya MD as PCP - General (Student in organized health care education/training program)  Preethi Monterroso as          Social History     Social History   Substance Use Topics     Smoking status: Never Smoker     Smokeless tobacco: Never Used     Alcohol use No     Marital Status:  Who lives in your household? Alone, son and daughter in law visits daily and helps with chores  Does your home have any of the following safety concerns? Loose rugs in the hallway, no grab bars in the bathroom, no handrails on the stairs or have poorly lit areas?  No  Do you feel threatened or controlled by a partner, ex-partner or anyone in your life? No  Has anyone hurt you physically,  for example by pushing, hitting, slapping or kicking you   or forcing you to have sex? No  Do you need help with the phone, transportation, shopping, preparing meals, housework, laundry, medications or managing money? Yes, Son helps with all of this.   Have you noticed any hearing difficulties? No      Risk Behaviors and Healthy Habits     How many servings of fruits and vegetables do you eat a day? 2  How often do you exercise and what do you do? Yes 5 times daily at adult .  Do you frequently ride without a seatbelt? Yes  Do you use tobacco?  No  Do you use any other drugs? No         Do you use alcohol?No    Today's PHQ-2 Score: 0    Timed up and go test of 13 seconds    Sexual Health     Are you sexually active?  No      FOR WOMEN  What year did you stop having periods? ~30 year agos  Any vaginal bleeding in the last year? No  Have you ever had an abnormal Pap smear? No    FUNCTIONAL ABILITY/SAFETY SCREENING     Fall Risk Assessment Today:      Hearing evaluation if done: Not done    EVALUATION OF COGNITIVE FUNCTION     Mood/affect:Normal  Appearance:Normal  Family member/caregiver input: Not pressent    Mini Cog Scoring   3 words recalled.3 points  Clock Draw Test result:  Abnormal: Unable to draw clock due to cultural differences.    SCREENING FOR PREVENTION and EARLY DETECTION     Corrected Visual acuity: Not done since seen by ophthalmology 2 months ago.     Screening Lipid Level (covered every 5 years ): Testing not indicated, completed on 17  Cervical cancer screening: Not indicated due to age and not high risk.  HIV screening (at risk ):  Testing not indicated   Colon CA Screening (>50-75 ) (FITT annually or colonoscopy every 10years):  Date done 3/21/18  Result(s) negative FITT test    Breast CA Screenin-2 years 50-74years:  Date done 3/2/18 and  Result(s) normal    Dexa Scan (>65 yrs) (covered every 2 years):  Done in 2015, on treatment for osteoporosis    Diabetes Screening : [facility]BG  "covered if at risk (obesity, HTN, dyslipidemia, FH): Date done 8/23/18  Result(s) A1c of 5.9    CV Risk based on Pooled Cohort Risk:  The 10-year ASCVD risk score ([place]jess[place] HILL Jr, et al., 2013) is: 8.4%    Values used to calculate the score:      Age: 73 years      Sex: Female      Is Non- : No      Diabetic: No      Tobacco smoker: No      Systolic Blood Pressure: 115 mmHg      Is BP treated: No      HDL Cholesterol: 66 mg/dL      Total Cholesterol: 196 mg/dL    Advanced Directives: Discussed and patient desires to be DNR/DNI.      Immunization History   Administered Date(s) Administered     Influenza (IIV3) PF 08/30/2017     Pneumo Conj 13-V (2010&after) 07/27/2018     TDAP Vaccine (Boostrix) 07/27/2018     Reviewed Immunization Record Today  Pneumoccocal Vaccine: Up-to-date  Varicella Vaccine: Up-to-date  TDaP: Up-to-date  Flu shot: Received at adult day care already this year         Physical Exam     Vitals: /71  Pulse 57  Temp 98  F (36.7  C) (Oral)  Resp 20  Ht 4' 6.33\" (138 cm)  Wt 97 lb 6.4 oz (44.2 kg)  SpO2 99%  BMI 23.2 kg/m2  BMI= Body mass index is 23.2 kg/(m^2).     GENERAL APPEARANCE: healthy, alert and no distress  EYES: Eyes grossly normal to inspection, PERRL and conjunctivae and sclerae normal  HENT: ear canals and TM's normal, nose and mouth without ulcers or lesions, oropharynx clear and oral mucous membranes moist  NECK: no adenopathy, no asymmetry, masses, or scars and thyroid normal to palpation  RESP: lungs clear to auscultation - no rales, rhonchi or wheezes  BREAST: deferred by patent  CV: regular rate and rhythm, normal S1 S2, no S3 or S4, no murmur, click or rub, no peripheral edema and peripheral pulses strong  ABDOMEN: soft, nontender, no hepatosplenomegaly, no masses and bowel sounds normal  MS: no musculoskeletal defects are noted and gait is age appropriate without ataxia  SKIN: no suspicious lesions or rashes  NEURO: Normal strength and tone, " sensory exam grossly normal, mentation intact and speech normal  PSYCH: mentation appears normal and affect normal/bright        Assessment and Plan   subsequent   Medicare Wellness Exam  1. Health Care Maintenance: Normal Physical Exam    PLAN:  Reviewed Preventive Services with patient and preventive service plan is complete. Routine follow up in one year.     See was seen today for wellness visit and medication reconciliation.    Diagnoses and all orders for this visit:    Routine general medical examination at a health care facility      Options for treatment and follow-up care were reviewed with the See You and/or guardian engaged in the decision making process and verbalized understanding of the options discussed and agreed with the final plan.    Luis Alberto Maya MD  [facility] Resident  Pager# [phone]    Precepted with: Daron Burgos MD

## 2018-11-28 NOTE — NURSING NOTE
Vision not done - pt went to eye Dr. 2 months ago    Due to patient being non-English speaking/uses sign language, an  was used for this visit. Only for face-to-face interpretation by an external agency, date and length of interpretation can be found on the scanned worksheet.     name: Carolina Jensen  Agency: [employer]  Language: [facility]   Telephone number: [phone]  Type of interpretation: Face-to-face, spoken

## 2018-11-30 LAB
CALCIUM 24H UR-MRATE: 5 MG/24HR (ref 20–275)
CHLORIDE 24H UR-SRATE: 7 MMOL/24HR (ref 110–250)
CITRATE 24H UR-MCNC: 27 MG/24HR
CREATININE, 24 HR URINE - HISTORICAL: 231.5 MG/24HR
MAGNESIUM 24H UR-MRATE: 4 MG/24 HR (ref 75–150)
OXALATE MG/SPEC: 7.1 MG/24HR (ref 7–44)
PH UR STRIP: 6 [PH] (ref 4.5–8)
PHOSPHORUS URINE MG/SPEC: 154.4 MG/24HR
POTASSIUM 24H UR-SCNC: 7 MMOL/24HR (ref 30–90)
SODIUM 24H UR-SRATE: 4 MMOL/24HR (ref 40–217)
SPECIMEN VOL UR: 150 ML
URIC ACID URINE MG/SPEC: 104 MG/24HR (ref 250–750)

## 2018-12-04 ENCOUNTER — COMMUNICATION - HEALTHEAST (OUTPATIENT)
Dept: UROLOGY | Facility: CLINIC | Age: 73
End: 2018-12-04

## 2018-12-05 NOTE — PROGRESS NOTES
Preceptor Attestation:  Patient's case reviewed and discussed with Luis Alberto Maya MD resident and I evaluated the patient. I agree with written assessment and plan of care.  Supervising Physician:  Kevin Burgos MD MD  [facility]

## 2018-12-14 ENCOUNTER — MEDICAL CORRESPONDENCE (OUTPATIENT)
Dept: HEALTH INFORMATION MANAGEMENT | Facility: CLINIC | Age: 73
End: 2018-12-14

## 2018-12-18 ENCOUNTER — COMMUNICATION - HEALTHEAST (OUTPATIENT)
Dept: UROLOGY | Facility: CLINIC | Age: 73
End: 2018-12-18

## 2019-01-14 ENCOUNTER — AMBULATORY - HEALTHEAST (OUTPATIENT)
Dept: LAB | Facility: HOSPITAL | Age: 74
End: 2019-01-14

## 2019-01-14 DIAGNOSIS — N20.1 CALCULI, URETER: ICD-10-CM

## 2019-01-15 LAB
CALCIUM 24H UR-MRATE: 219 MG/24HR (ref 20–275)
CHLORIDE 24H UR-SRATE: 117 MMOL/24HR (ref 110–250)
CITRATE 24H UR-MCNC: 402 MG/24HR
CREATININE, 24 HR URINE - HISTORICAL: 932 MG/24HR
MAGNESIUM 24H UR-MRATE: 80 MG/24 HR (ref 75–150)
OXALATE MG/SPEC: 22.3 MG/24HR (ref 7–44)
PH UR STRIP: 5.5 [PH] (ref 4.5–8)
PHOSPHORUS URINE MG/SPEC: 983 MG/24HR
POTASSIUM 24H UR-SCNC: 50 MMOL/24HR (ref 30–90)
SODIUM 24H UR-SRATE: 120 MMOL/24HR (ref 40–217)
SPECIMEN VOL UR: 1000 ML
URIC ACID URINE MG/SPEC: 184 MG/24HR (ref 250–750)

## 2019-01-23 ENCOUNTER — OFFICE VISIT (OUTPATIENT)
Dept: FAMILY MEDICINE | Facility: CLINIC | Age: 74
End: 2019-01-23
Payer: COMMERCIAL

## 2019-01-23 VITALS
RESPIRATION RATE: 14 BRPM | DIASTOLIC BLOOD PRESSURE: 71 MMHG | HEART RATE: 71 BPM | SYSTOLIC BLOOD PRESSURE: 117 MMHG | BODY MASS INDEX: 24.07 KG/M2 | OXYGEN SATURATION: 95 % | TEMPERATURE: 98 F | WEIGHT: 104 LBS | HEIGHT: 55 IN

## 2019-01-23 DIAGNOSIS — R39.89 DARK YELLOW-COLORED URINE: ICD-10-CM

## 2019-01-23 DIAGNOSIS — M81.0 AGE-RELATED OSTEOPOROSIS WITHOUT CURRENT PATHOLOGICAL FRACTURE: Primary | ICD-10-CM

## 2019-01-23 DIAGNOSIS — M15.0 PRIMARY OSTEOARTHRITIS INVOLVING MULTIPLE JOINTS: ICD-10-CM

## 2019-01-23 RX ORDER — ACETAMINOPHEN 500 MG
500 TABLET ORAL EVERY 4 HOURS PRN
Qty: 30 TABLET | Refills: 11 | Status: SHIPPED | OUTPATIENT
Start: 2019-01-23 | End: 2019-01-23

## 2019-01-23 RX ORDER — ALENDRONATE SODIUM 70 MG/1
70 TABLET ORAL
Qty: 4 TABLET | Refills: 5 | Status: SHIPPED | OUTPATIENT
Start: 2019-01-23 | End: 2019-03-21

## 2019-01-23 RX ORDER — ACETAMINOPHEN 500 MG
500 TABLET ORAL EVERY 4 HOURS PRN
Qty: 90 TABLET | Refills: 3 | Status: SHIPPED | OUTPATIENT
Start: 2019-01-23 | End: 2019-05-24

## 2019-01-23 ASSESSMENT — MIFFLIN-ST. JEOR: SCORE: 815.12

## 2019-01-23 NOTE — NURSING NOTE
Due to patient being non-English speaking/uses sign language, an  was used for this visit. Only for face-to-face interpretation by an external agency, date and length of interpretation can be found on the scanned worksheet.     name: Jayjay Orta  Agency: [employer]  Language: [place]   Telephone number: [phone]  Type of interpretation: Face-to-face, spoken

## 2019-01-24 NOTE — PROGRESS NOTES
Preceptor Attestation:   Patient seen, evaluated and discussed with the resident. I have verified the content of the note, which accurately reflects my assessment of the patient and the plan of care.    Supervising Physician:Horacio Jay MD    [facility]

## 2019-01-24 NOTE — PROGRESS NOTES
"  SUBJECTIVE:                                                    See You is a 73 year old year old female who presents to clinic today for the following health issues:    Osteoporosis Follow-Up:    Last DEXA was in October 2015 with T scores of femoral necks of -2.8 and -3. Started on Fosamax at that time. Tolerating well. No repeat DEXA since that time.    No recent falls.    Amount of exercise or physical activity: 4-5 days/week for an average of 15-30 minutes    Problems taking medications regularly: No    Medication side effects: none    Denies headache, jaw pain, abdominal pain, or other symptoms.    Would like refill of tylenol for joint pains    Yellow urine:  Concern for \"very yellow urine\". Ongoing for \"awhile\". States she drinks 8 oz of water daily plus some ensure shakes. No fever, chills, dysuria, urinary frequency, incontinence, or other related symptoms.     Patient is an established patient of this clinic.    A [employer]  was used for this visit  ----------------------------------------------------------------------------------------------------------------------  Patient Active Problem List   Diagnosis     Major depression     Esophageal reflux     Generalized osteoarthrosis, unspecified site     Hyperlipidemia     Osteoporosis     Prediabetes     Past Surgical History:   Procedure Laterality Date     CHOLECYSTECTOMY         Social History     Tobacco Use     Smoking status: Never Smoker     Smokeless tobacco: Never Used   Substance Use Topics     Alcohol use: No     Family History   Problem Relation Age of Onset     Diabetes No family hx of      Coronary Artery Disease No family hx of      Breast Cancer No family hx of      Cancer - colorectal No family hx of      Ovarian Cancer No family hx of      Prostate Cancer No family hx of      Hypertension No family hx of      Other Cancer No family hx of      Mental Illness No family hx of      Cerebrovascular Disease No family hx of      Anesthesia " "Reaction No family hx of      Asthma No family hx of      Osteoporosis No family hx of      Known Genetic Syndrome No family hx of      Obesity No family hx of      Unknown/Adopted No family hx of          Problem list and past medical, surgical, social, and family histories reviewed & adjusted, as indicated.    Current Outpatient Medications   Medication Sig Dispense Refill     acetaminophen (TYLENOL) 500 MG tablet Take 1 tablet (500 mg) by mouth every 4 hours as needed for pain 90 tablet 3     alendronate (FOSAMAX) 70 MG tablet Take 1 tablet (70 mg) by mouth every 7 days Take 60 minutes before am meal with 8 oz. water. Remain upright for 30 minutes. 4 tablet 5     FLUoxetine (PROZAC) 20 MG capsule Take 1 capsule (20 mg) by mouth daily 90 capsule 1     Nutritional Supplements (ENSURE HIGH PROTEIN) Take 1 Can by mouth 2 times daily 60 Can 11     omeprazole (PRILOSEC) 20 MG CR capsule Take 1 capsule (20 mg) by mouth daily (Patient not taking: Reported on 1/23/2019) 90 capsule 0     Medication list reviewed and updated as indicated.    Allergies   Allergen Reactions     Nka [No Known Allergies]      Allergies reviewed and updated as indicated.  ----------------------------------------------------------------------------------------------------------------------  ROS:  Constitutional, HEENT, cardiovascular, pulmonary, GI, musculoskeletal, neuro, skin, and psych systems are negative, except as otherwise noted.    OBJECTIVE:     /71   Pulse 71   Temp 98  F (36.7  C) (Oral)   Resp 14   Ht 1.375 m (4' 6.13\")   Wt 47.2 kg (104 lb)   SpO2 95%   BMI 24.95 kg/m    Body mass index is 24.95 kg/m .  General: Appears well and in no acute distress.  Cardiovascular: Regular rate and rhythm, normal S1 and S2 without murmur. No extra heartsounds or friction rub. Radial pulses present and equal bilaterally.  Respiratory: Lungs clear to auscultation bilaterally. No wheezing or crackles. No prolonged expiration. Symmetrical " chest rise.  Musculoskeletal: No gross extremity deformities. No peripheral edema. Normal muscle bulk.    Diagnostic Test Results:  none     ASSESSMENT/PLAN:     1. Age-related osteoporosis without current pathological fracture: Continue Fosamax, consider drug holiday in 1-1.5 years. Obtain DEXA looking for improvement/stability on medication. Encourage weight bearing exercise. Decrease risk of falls, promote good footwear. No recent falls.  - Dexa hip/pelvis/spine*; Future  - alendronate (FOSAMAX) 70 MG tablet; Take 1 tablet (70 mg) by mouth every 7 days Take 60 minutes before am meal with 8 oz. water. Remain upright for 30 minutes.  Dispense: 4 tablet; Refill: 5    2. Primary osteoarthritis involving multiple joints: Refilled medication per request.  - acetaminophen (TYLENOL) 500 MG tablet; Take 1 tablet (500 mg) by mouth every 4 hours as needed for pain  Dispense: 90 tablet; Refill: 3    3. Dark yellow-colored urine: No other symptoms. Likely concentrated given low fluid intake. Increase fluid intake and follow-up at next visit if not improving.    Schedule follow-up appointment in 3 month(s).      Medications Discontinued During This Encounter   Medication Reason     alendronate (FOSAMAX) 70 MG tablet Reorder     acetaminophen (TYLENOL) 500 MG tablet Reorder     acetaminophen (TYLENOL) 500 MG tablet Reorder       Luis Alberto Maya MD  [facility] Resident  Pager# [phone]    Precepted with: Horacio Jay III, MD    Options for treatment and follow-up care were reviewed with the patient and/or guardian. See You and/or guardian engaged in the decision making process and verbalized understanding of the options discussed and agreed with the final plan    This chart is completed utilizing dictation software; typos and/or incorrect word substitutions may unintentionally occur.     The Following is for Coding Purposes Only    Dx Type # This visit   Self-Limited                  (1 pt ea) 0    Established, Stable      (1 pt ea) 2   Established, Worse      (2 pt ea) 0   New, Simple                 (3 pt ea) 1   New, Further Work-Up (4 pt ea) 0       Total Points 4+ - High       Data Reviewed    Decision to Obtain Records                 (1 pt) 0   Review/Summarize Old Records         (2 pt) 0   Order/Review Labs                              (1 pt) 1   Order/Review Radiology                      (1 pt) 0   Order/Review Medical Tests                (1 pt) 0   Independent Review of EKG/XRay (2 pt ea) 0       Total Points 1 - Straightforward       Risk    1       One Minor Problem Yes   Basic Labs / Imaging / EKG Yes   Supportive Cares Yes   2       2+ Minor / Stable Chronic / Simple Acute Problem   No   Unstressed Test (Biopsy, PFT's, etc) No   OTC Meds / PT/OT No   3       Complicated Acute / Worse Chronic / 2+ Stable / Undiagnosed  Yes   Stress Test and Endoscopies No   Prescription Drugs or Treatment of Closed Injury Yes   4       Life Threatening Condition No   Rx With Monitoring / De-Escalate Care For Poor Prognosis  No   Level 3

## 2019-01-31 ENCOUNTER — RECORDS - HEALTHEAST (OUTPATIENT)
Dept: ADMINISTRATIVE | Facility: OTHER | Age: 74
End: 2019-01-31

## 2019-02-01 DIAGNOSIS — M81.0 AGE-RELATED OSTEOPOROSIS WITHOUT CURRENT PATHOLOGICAL FRACTURE: ICD-10-CM
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
## 2019-02-12 ENCOUNTER — COMMUNICATION - HEALTHEAST (OUTPATIENT)
Dept: UROLOGY | Facility: CLINIC | Age: 74
End: 2019-02-12

## 2019-03-06 ENCOUNTER — OFFICE VISIT - HEALTHEAST (OUTPATIENT)
Dept: UROLOGY | Facility: CLINIC | Age: 74
End: 2019-03-06

## 2019-03-06 DIAGNOSIS — R34 LOW URINE OUTPUT: ICD-10-CM

## 2019-03-06 DIAGNOSIS — Z87.442 HISTORY OF KIDNEY STONES: ICD-10-CM

## 2019-03-06 DIAGNOSIS — N20.0 URIC ACID NEPHROLITHIASIS: ICD-10-CM

## 2019-03-08 DIAGNOSIS — F33.1 MODERATE EPISODE OF RECURRENT MAJOR DEPRESSIVE DISORDER (H): ICD-10-CM

## 2019-03-18 DIAGNOSIS — K21.9 GASTROESOPHAGEAL REFLUX DISEASE, ESOPHAGITIS PRESENCE NOT SPECIFIED: ICD-10-CM

## 2019-03-21 ENCOUNTER — OFFICE VISIT (OUTPATIENT)
Dept: FAMILY MEDICINE | Facility: CLINIC | Age: 74
End: 2019-03-21
Payer: COMMERCIAL

## 2019-03-21 VITALS
BODY MASS INDEX: 24.76 KG/M2 | HEIGHT: 55 IN | TEMPERATURE: 97.9 F | RESPIRATION RATE: 18 BRPM | SYSTOLIC BLOOD PRESSURE: 138 MMHG | WEIGHT: 107 LBS | DIASTOLIC BLOOD PRESSURE: 71 MMHG | OXYGEN SATURATION: 99 % | HEART RATE: 65 BPM

## 2019-03-21 DIAGNOSIS — R21 RASH AND NONSPECIFIC SKIN ERUPTION: Primary | ICD-10-CM

## 2019-03-21 DIAGNOSIS — M81.0 AGE-RELATED OSTEOPOROSIS WITHOUT CURRENT PATHOLOGICAL FRACTURE: ICD-10-CM

## 2019-03-21 RX ORDER — CETIRIZINE HYDROCHLORIDE 10 MG/1
10 TABLET ORAL DAILY
Qty: 14 TABLET | Refills: 0 | Status: SHIPPED | OUTPATIENT
Start: 2019-03-21 | End: 2020-10-21

## 2019-03-21 RX ORDER — ALENDRONATE SODIUM 70 MG/1
70 TABLET ORAL
Qty: 4 TABLET | Refills: 5 | Status: SHIPPED | OUTPATIENT
Start: 2019-03-21 | End: 2019-12-16

## 2019-03-21 RX ORDER — BETAMETHASONE DIPROPIONATE 0.5 MG/G
CREAM TOPICAL 2 TIMES DAILY
Qty: 45 G | Refills: 0 | Status: SHIPPED | OUTPATIENT
Start: 2019-03-21 | End: 2020-10-21

## 2019-03-21 ASSESSMENT — MIFFLIN-ST. JEOR: SCORE: 828.73

## 2019-03-21 NOTE — NURSING NOTE
Due to patient being non-English speaking/uses sign language, an  was used for this visit. Only for face-to-face interpretation by an external agency, date and length of interpretation can be found on the scanned worksheet.     name: Rahel Delgado  Agency: [employer]  Language: [place]   Telephone number: [phone]  Type of interpretation: Face-to-face, spoken

## 2019-03-21 NOTE — PROGRESS NOTES
"Chief Complaint   Patient presents with     Derm Problem     Rash on neck for 4 days, Pt used Diprosone - seemed to help     Refill Request     Refills: Alendronate,     Medication Reconciliation     completed            HPI:       See You is a 73 year old  female with a significant past medical history of osteoporosis who presents for the new concern(s) of    1. Rash on neck: has noticed for 4 days, has been trying itchy, has been putting on diprosone cream and has helped the itching, no fevers, any other rashes on her body, any new clothing, new lotion or cosmetic,     2. Osteoporosis: needs a refill of her alendronate, she has been on it since 6/2015, she had a bone density 2-1-19 that did show improvement in her bone density.    3. Ureteral stone: [facility] note reviewed from 3-6-19, make 100% uric acid stone  \"Based on review of findings with the patient, she will repeat 24 hour urine collection 3 months after dietary modification and medical intervention. She will initiate increased fluid consumption to generate at least 2,000 ml urine daily and initiate potassium citrate therapy at 10 mEq twice daily.\"- [facility]. However, she has not been able to tolerate the potassium citrate, she states it made her have hotflashes/sweats.    A Zimory  was used for this visit         PMHX:     Patient Active Problem List   Diagnosis     Major depression     Esophageal reflux     Generalized osteoarthrosis, unspecified site     Hyperlipidemia     Osteoporosis     Prediabetes       Current Outpatient Medications   Medication Sig Dispense Refill     acetaminophen (TYLENOL) 500 MG tablet Take 1 tablet (500 mg) by mouth every 4 hours as needed for pain 90 tablet 3     alendronate (FOSAMAX) 70 MG tablet Take 1 tablet (70 mg) by mouth every 7 days Take 60 minutes before am meal with 8 oz. water. Remain upright for 30 minutes. 4 tablet 5     betamethasone dipropionate (DIPROSONE) 0.05 % " external cream Apply topically 2 times daily 45 g 0     cetirizine (ZYRTEC) 10 MG tablet Take 1 tablet (10 mg) by mouth daily 14 tablet 0     FLUoxetine (PROZAC) 20 MG capsule Take 1 capsule (20 mg) by mouth daily 90 capsule 1     Nutritional Supplements (ENSURE HIGH PROTEIN) Take 1 Can by mouth 2 times daily 60 Can 11       Social History     Socioeconomic History     Marital status: Single     Spouse name: Not on file     Number of children: Not on file     Years of education: Not on file     Highest education level: Not on file   Occupational History     Not on file   Social Needs     Financial resource strain: Not on file     Food insecurity:     Worry: Not on file     Inability: Not on file     Transportation needs:     Medical: Not on file     Non-medical: Not on file   Tobacco Use     Smoking status: Never Smoker     Smokeless tobacco: Never Used   Substance and Sexual Activity     Alcohol use: No     Drug use: No     Sexual activity: No   Lifestyle     Physical activity:     Days per week: Not on file     Minutes per session: Not on file     Stress: Not on file   Relationships     Social connections:     Talks on phone: Not on file     Gets together: Not on file     Attends [employer] service: Not on file     Active member of club or organization: Not on file     Attends meetings of clubs or organizations: Not on file     Relationship status: Not on file     Intimate partner violence:     Fear of current or ex partner: Not on file     Emotionally abused: Not on file     Physically abused: Not on file     Forced sexual activity: Not on file   Other Topics Concern     Parent/sibling w/ CABG, MI or angioplasty before 65F 55M? Not Asked   Social History Narrative    This January moved to [place] from [place], [place]. [place] she lived with her son and his family, however, in January they kicked her out of the house and she relocated to be near her brother here. Here she lives alone in a small apartment.   "         Allergies   Allergen Reactions     Nka [No Known Allergies]        No results found for this or any previous visit (from the past 24 hour(s)).         Review of Systems:   C: NEGATIVE for fatigue, unexpected change in weight  E: NEGATIVE for acute vision problems or changes  R: NEGATIVE for significant cough or shortness of breath  CV: NEGATIVE for chest pain, palpitations or new or worsening peripheral edema  P: NEGATIVE for changes in mood or affect          Physical Exam:     Vitals:    03/21/19 1449   BP: 138/71   Pulse: 65   Resp: 18   Temp: 97.9  F (36.6  C)   TempSrc: Oral   SpO2: 99%   Weight: 48.5 kg (107 lb)   Height: 1.375 m (4' 6.13\")     Body mass index is 25.67 kg/m .    GENERAL APPEARANCE: healthy, alert and no distress,  NECK: slightly red, diffuse, irritation of neck in the anterior neck to her neckline of her shirt. No pustules or blisters, looks consistent with sunlight dermatitis  RESP: lungs clear to auscultation - no rales, rhonchi or wheezes  CV: regular rate and rhythm,  and no murmur, click,  rub or gallop      Assessment and Plan     1. Rash and nonspecific skin eruption  It looks like light/sun exposure irritation  - betamethasone dipropionate (DIPROSONE) 0.05 % external cream; Apply topically 2 times daily  Dispense: 45 g; Refill: 0  - cetirizine (ZYRTEC) 10 MG tablet; Take 1 tablet (10 mg) by mouth daily  Dispense: 14 tablet; Refill: 0    2. Age-related osteoporosis without current pathological fracture  Consider drug holiday in 1-2 years  - alendronate (FOSAMAX) 70 MG tablet; Take 1 tablet (70 mg) by mouth every 7 days Take 60 minutes before am meal with 8 oz. water. Remain upright for 30 minutes.  Dispense: 4 tablet; Refill: 5      Options for treatment and follow-up care were reviewed with the patient and/or guardian. See You and/or guardian engaged in the decision making process and verbalized understanding of the options discussed and agreed with the final plan.    Mariajose" Budd, DO

## 2019-03-21 NOTE — PATIENT INSTRUCTIONS
Put a good moisturizing cream on at night (Eucerin/cetaphil)  Patient Education     How to Quit Smoking  Smoking is one of the hardest habits to break. About half of all people who have ever smoked have been able to quit. Most people who still smoke want to quit. Here are some of the best ways to stop smoking.    Keep trying  Most smokers make many attempts at quitting before they are successful. It s important not to give up.  Go cold turkey  Most former smokers quit cold turkey (all at once). Trying to cut back gradually doesn't seem to work as well, perhaps because it continues the smoking habit. Also, it is possible to inhale more while smoking fewer cigarettes. This results in the same amount of nicotine in your body.  Get support  Support programs can be a big help, especially for heavy smokers. These groups offer lectures, ways to change behavior, and peer support. Here are some ways to find a support program:    Free national quitline: 800-QUIT-NOW (324-115-4966).    [facility] quit-smoking programs.    American Lung Association: (517.898.3553).    American Cancer Society (995-970-8306).  Support at home is important too. Nonsmokers can offer praise and encouragement. If the smoker in your life finds it hard to quit, encourage them to keep trying.  Over-the-counter medicines  Nicotine replacement therapy may make quitting easier. Certain aids, such as the nicotine patch, gum, and lozenges, are available without a prescription. It is best to use these under a doctor s care, though. The skin patch provides a steady supply of nicotine. Nicotine gum and lozenges give temporary bursts of low levels of nicotine. Both methods reduce the craving for cigarettes. Warning: If you have nausea, vomiting, dizziness, weakness, or a fast heartbeat, stop using these products and see your doctor.  Prescription medicines  After reviewing your smoking patterns and past attempts to quit, your doctor may offer a prescription  "medicine such as bupropion, varenicline, a nicotine inhaler, or nasal spray. Each has advantages and side effects. Your doctor can review these with you.  Health benefits of quitting  The benefits of quitting start right away and keep improving the longer you go without smoking. These benefits occur at any age.  So whether you are 17 or 70, quitting is a good decision. Some of the benefits include:    20 minutes: Blood pressure and pulse return to normal.    8 hours: Oxygen levels return to normal.    2 days: Ability to smell and taste begin to improve as damaged nerves regrow.    2 to 3 weeks: Circulation and lung function improve.    1 to 9 months: Coughing, congestion, and shortness of breath decrease; tiredness decreases.    1 year: Risk of heart attack decreases by half.    5 years: Risk of lung cancer decreases by half; risk of stroke becomes the same as a nonsmoker s.  For more on how to quit smoking, try these online resources:     [employer] [place].gov    \"Clearing the Air\" booklet from the National Cancer [place]: WANdisco.gov/sites/default/files/pdf/clearing-the-air-accessible.pdf  Date Last Reviewed: 3/1/2017    7562-0574 [employer]. [address] [place] [address]. All rights reserved. This information is not intended as a substitute for professional medical care. Always follow your healthcare professional's instructions.           Patient Education     How to Quit Smoking  Smoking is one of the hardest habits to break. About half of all people who have ever smoked have been able to quit. Most people who still smoke want to quit. Here are some of the best ways to stop smoking.    Keep trying  Most smokers make many attempts at quitting before they are successful. It s important not to give up.  Go cold turkey  Most former smokers quit cold turkey (all at once). Trying to cut back gradually doesn't seem to work as well, perhaps because it continues the smoking habit. Also, it is " possible to inhale more while smoking fewer cigarettes. This results in the same amount of nicotine in your body.  Get support  Support programs can be a big help, especially for heavy smokers. These groups offer lectures, ways to change behavior, and peer support. Here are some ways to find a support program:    Free national quitline: 800-QUIT-NOW (634-035-9186).    Hospital quit-smoking programs.    American Lung Association: (264.240.3354).    American Cancer Society (179-320-9039).  Support at home is important too. Nonsmokers can offer praise and encouragement. If the smoker in your life finds it hard to quit, encourage them to keep trying.  Over-the-counter medicines  Nicotine replacement therapy may make quitting easier. Certain aids, such as the nicotine patch, gum, and lozenges, are available without a prescription. It is best to use these under a doctor s care, though. The skin patch provides a steady supply of nicotine. Nicotine gum and lozenges give temporary bursts of low levels of nicotine. Both methods reduce the craving for cigarettes. Warning: If you have nausea, vomiting, dizziness, weakness, or a fast heartbeat, stop using these products and see your doctor.  Prescription medicines  After reviewing your smoking patterns and past attempts to quit, your doctor may offer a prescription medicine such as bupropion, varenicline, a nicotine inhaler, or nasal spray. Each has advantages and side effects. Your doctor can review these with you.  Health benefits of quitting  The benefits of quitting start right away and keep improving the longer you go without smoking. These benefits occur at any age.  So whether you are 17 or 70, quitting is a good decision. Some of the benefits include:    20 minutes: Blood pressure and pulse return to normal.    8 hours: Oxygen levels return to normal.    2 days: Ability to smell and taste begin to improve as damaged nerves regrow.    2 to 3 weeks: Circulation and lung  "function improve.    1 to 9 months: Coughing, congestion, and shortness of breath decrease; tiredness decreases.    1 year: Risk of heart attack decreases by half.    5 years: Risk of lung cancer decreases by half; risk of stroke becomes the same as a nonsmoker s.  For more on how to quit smoking, try these online resources:     Smokefree.gov    \"Clearing the Air\" booklet from the National Cancer [place]: smokefree.gov/sites/default/files/pdf/clearing-the-air-accessible.pdf  Date Last Reviewed: 3/1/2017    0932-2082 [employer]. [address] [place] [address]. All rights reserved. This information is not intended as a substitute for professional medical care. Always follow your healthcare professional's instructions.           Patient Education     How to Quit Smoking  Smoking is one of the hardest habits to break. About half of all people who have ever smoked have been able to quit. Most people who still smoke want to quit. Here are some of the best ways to stop smoking.    Keep trying  Most smokers make many attempts at quitting before they are successful. It s important not to give up.  Go cold turkey  Most former smokers quit cold turkey (all at once). Trying to cut back gradually doesn't seem to work as well, perhaps because it continues the smoking habit. Also, it is possible to inhale more while smoking fewer cigarettes. This results in the same amount of nicotine in your body.  Get support  Support programs can be a big help, especially for heavy smokers. These groups offer lectures, ways to change behavior, and peer support. Here are some ways to find a support program:    Free national quitline: 800-QUIT-NOW (185-463-0875).    Hospital quit-smoking programs.    American Lung Association: (780.667.1341).    American Cancer Society (122-201-0732).  Support at home is important too. Nonsmokers can offer praise and encouragement. If the smoker in your life finds it hard to quit, " "encourage them to keep trying.  Over-the-counter medicines  Nicotine replacement therapy may make quitting easier. Certain aids, such as the nicotine patch, gum, and lozenges, are available without a prescription. It is best to use these under a doctor s care, though. The skin patch provides a steady supply of nicotine. Nicotine gum and lozenges give temporary bursts of low levels of nicotine. Both methods reduce the craving for cigarettes. Warning: If you have nausea, vomiting, dizziness, weakness, or a fast heartbeat, stop using these products and see your doctor.  Prescription medicines  After reviewing your smoking patterns and past attempts to quit, your doctor may offer a prescription medicine such as bupropion, varenicline, a nicotine inhaler, or nasal spray. Each has advantages and side effects. Your doctor can review these with you.  Health benefits of quitting  The benefits of quitting start right away and keep improving the longer you go without smoking. These benefits occur at any age.  So whether you are 17 or 70, quitting is a good decision. Some of the benefits include:    20 minutes: Blood pressure and pulse return to normal.    8 hours: Oxygen levels return to normal.    2 days: Ability to smell and taste begin to improve as damaged nerves regrow.    2 to 3 weeks: Circulation and lung function improve.    1 to 9 months: Coughing, congestion, and shortness of breath decrease; tiredness decreases.    1 year: Risk of heart attack decreases by half.    5 years: Risk of lung cancer decreases by half; risk of stroke becomes the same as a nonsmoker s.  For more on how to quit smoking, try these online resources:     Smokefree.gov    \"Clearing the Air\" booklet from the National Cancer [place]: smokefree.gov/sites/default/files/pdf/clearing-the-air-accessible.pdf  Date Last Reviewed: 3/1/2017    8266-5298 [employer]. [address] [place] [address], [place] [address]. All rights reserved. This " information is not intended as a substitute for professional medical care. Always follow your healthcare professional's instructions.

## 2019-05-09 ENCOUNTER — DOCUMENTATION ONLY (OUTPATIENT)
Dept: FAMILY MEDICINE | Facility: CLINIC | Age: 74
End: 2019-05-09

## 2019-05-14 NOTE — PROGRESS NOTES
Visit to the client's home for annual health risk assessment and PCA assessment.  An  was present.    Current situation/living environment  See is a 73 year old female who lives alone in a high rise building. Today we met at her son's home, as his wife is her RP ([employer] for PCA), and had to be present. She lived with them a few years back and they are supportive to her needs.    Activities of daily living (ADL)/instrumental activities of daily living (IADL) and functional issues  Client needs help with the following ADL's: dressing (making sure she is wearing appropriate clothing), grooming and bathing, though she is shy to have someone help her  Client needs help with the following IADL's: shopping, cooking, housekeeping, laundry, managing finances/bills and transportation  Client states she is unable to perform the above due to pain, limited ROM in her extremities and Major Depressive Disorder      Health concerns for today  No concerns today. Last clinic visit was in November for a Medicare Wellness Visit that I assisted in setting up for her  Has patient fallen 2 or more times in the last year? No  Has patient fallen with injury in the last year? No    Cognition/mental health  She has an RP to help her make decisions. She is usually rather tearful during our visit, but today was more upbeat. This is one of the first times since I have known her that she hasn't cried during our assessments. She was sitting on the couch, where last time she was on the floor, tucked away to the side of the couch. She has been going to a [facility] therapist monthly.    STARS/Med Adherence  Client is non-compliant with the following quality measures: NA   Comments: Has had mammogram in 2018, and a FITT test as well.    Client's Plan of Care consists of:  Adult day care (5 days/week), Life line, Personal care assistance (PCA) (1.75 hours per day) and Transportation. Requesting a cane (which I ordered 6 months ago but  did not know it hadn't been delivered) and pullups, 2 per day.    Preethi Monterroso, W  [phone]

## 2019-05-24 ENCOUNTER — OFFICE VISIT (OUTPATIENT)
Dept: FAMILY MEDICINE | Facility: CLINIC | Age: 74
End: 2019-05-24
Payer: COMMERCIAL

## 2019-05-24 VITALS
HEIGHT: 55 IN | WEIGHT: 104.4 LBS | SYSTOLIC BLOOD PRESSURE: 117 MMHG | TEMPERATURE: 97.6 F | BODY MASS INDEX: 24.16 KG/M2 | RESPIRATION RATE: 18 BRPM | HEART RATE: 67 BPM | OXYGEN SATURATION: 99 % | DIASTOLIC BLOOD PRESSURE: 72 MMHG

## 2019-05-24 DIAGNOSIS — R32 URINARY INCONTINENCE IN FEMALE: ICD-10-CM

## 2019-05-24 DIAGNOSIS — Z12.11 SPECIAL SCREENING FOR MALIGNANT NEOPLASMS, COLON: ICD-10-CM

## 2019-05-24 DIAGNOSIS — H53.8 BLURRED VISION: Primary | ICD-10-CM

## 2019-05-24 DIAGNOSIS — K21.9 GASTROESOPHAGEAL REFLUX DISEASE, ESOPHAGITIS PRESENCE NOT SPECIFIED: ICD-10-CM

## 2019-05-24 DIAGNOSIS — M15.0 PRIMARY OSTEOARTHRITIS INVOLVING MULTIPLE JOINTS: ICD-10-CM

## 2019-05-24 RX ORDER — LEVETIRACETAM 15 MG/ML
1 INJECTION IONTOPHORESIS 2 TIMES DAILY PRN
Qty: 180 EACH | Refills: 3 | Status: SHIPPED | OUTPATIENT
Start: 2019-05-24 | End: 2023-06-12

## 2019-05-24 RX ORDER — ACETAMINOPHEN 500 MG
500 TABLET ORAL EVERY 4 HOURS PRN
Qty: 90 TABLET | Refills: 3 | Status: SHIPPED | OUTPATIENT
Start: 2019-05-24 | End: 2019-08-05

## 2019-05-24 ASSESSMENT — MIFFLIN-ST. JEOR: SCORE: 813.81

## 2019-05-24 NOTE — NURSING NOTE
Due to patient being non-English speaking/uses sign language, an  was used for this visit. Only for face-to-face interpretation by an external agency, date and length of interpretation can be found on the scanned worksheet.     name: Janet rodriguez  Agency: [employer]  Language: Jagr[place]   Telephone number: [phone]  Type of interpretation: Face-to-face, spoken

## 2019-05-24 NOTE — PROGRESS NOTES
"  SUBJECTIVE:                                                    See You is a 73 year old year old female who presents to clinic today for the following health issues:    Left eye problems  Onset: ~1.5 months    Description:   Location: left  Blurry vision  Pain: no   Redness: no   Saw here eye doctor last week and they \"rinsed it out\" without benefit. Just became annoyed with it and that's why she saw her eye doctor. No acute change.    Accompanying Signs & Symptoms:  Discharge/mattering: no   Swelling: no   Visual changes: YES- blurriness if covering the right eye.  Fever: no   Nasal Congestion: no   Bothered by bright lights: no     History:   Trauma: no   Foreign body exposure: no     Precipitating factors:   Wearing contacts: no      Requests refills of tylenol, depends, and omeprazole.    Patient is an established patient of this clinic.    A [employer]  was used for this visit  ----------------------------------------------------------------------------------------------------------------------  Patient Active Problem List   Diagnosis     Major depression     Esophageal reflux     Generalized osteoarthrosis, unspecified site     Hyperlipidemia     Osteoporosis     Prediabetes     Past Surgical History:   Procedure Laterality Date     CHOLECYSTECTOMY         Social History     Tobacco Use     Smoking status: Never Smoker     Smokeless tobacco: Never Used   Substance Use Topics     Alcohol use: No     Family History   Problem Relation Age of Onset     Diabetes No family hx of      Coronary Artery Disease No family hx of      Breast Cancer No family hx of      Cancer - colorectal No family hx of      Ovarian Cancer No family hx of      Prostate Cancer No family hx of      Hypertension No family hx of      Other Cancer No family hx of      Mental Illness No family hx of      Cerebrovascular Disease No family hx of      Anesthesia Reaction No family hx of      Asthma No family hx of      Osteoporosis No " "family hx of      Known Genetic Syndrome No family hx of      Obesity No family hx of      Unknown/Adopted No family hx of          Problem list and past medical, surgical, social, and family histories reviewed & adjusted, as indicated.    Current Outpatient Medications   Medication Sig Dispense Refill     acetaminophen (TYLENOL) 500 MG tablet Take 1 tablet (500 mg) by mouth every 4 hours as needed for pain 90 tablet 3     alendronate (FOSAMAX) 70 MG tablet Take 1 tablet (70 mg) by mouth every 7 days Take 60 minutes before am meal with 8 oz. water. Remain upright for 30 minutes. 4 tablet 5     betamethasone dipropionate (DIPROSONE) 0.05 % external cream Apply topically 2 times daily 45 g 0     cetirizine (ZYRTEC) 10 MG tablet Take 1 tablet (10 mg) by mouth daily 14 tablet 0     FLUoxetine (PROZAC) 20 MG capsule Take 1 capsule (20 mg) by mouth daily 90 capsule 1     Nutritional Supplements (ENSURE HIGH PROTEIN) Take 1 Can by mouth 2 times daily 60 Can 11     Medication list reviewed and updated as indicated.    Allergies   Allergen Reactions     Nka [No Known Allergies]      Allergies reviewed and updated as indicated.  --------------------------------------------------------------------------  ROS:  Constitutional, HEENT, cardiovascular, pulmonary, GI, musculoskeletal, neuro, skin, and psych systems are negative, except as otherwise noted.    OBJECTIVE:     /72   Pulse 67   Temp 97.6  F (36.4  C) (Oral)   Resp 18   Ht 1.37 m (4' 5.94\")   Wt 47.4 kg (104 lb 6.4 oz)   SpO2 99%   BMI 25.23 kg/m    Body mass index is 25.23 kg/m .  General: Appears well and in no acute distress.  HEENT: Arcus senilis. Eyes grossly normal to inspection. Extraocular movements intact. Pupils equal, round, and reactive to light. Mucous membranes moist. No ulcers or lesions noted in the oropharynx.  Cardiovascular: Regular rate and rhythm, normal S1 and S2 without murmur. No extra heartsounds or friction rub. Radial pulses " present and equal bilaterally.  Respiratory: Lungs clear to auscultation bilaterally. No wheezing or crackles. No prolonged expiration. Symmetrical chest rise.  Musculoskeletal: No gross extremity deformities. No peripheral edema. Normal muscle bulk.    Diagnostic Test Results:  none     ASSESSMENT/PLAN:     1. Blurred vision: Recommend further management by ophthalmology. No obvious signs of acute glaucoma or other emergency.  - OPHTHALMOLOGY ADULT REFERRAL    2. Special screening for malignant neoplasms, colon: Will screen for colon cancer  - Fecal Occult Blood - FIT, iFOB ([facility]); Future    3. Primary osteoarthritis involving multiple joints: Refilled medication per patient request.  - acetaminophen (TYLENOL) 500 MG tablet; Take 1 tablet (500 mg) by mouth every 4 hours as needed for pain  Dispense: 90 tablet; Refill: 3    4. Gastroesophageal reflux disease, esophagitis presence not specified  Refilled medication per patient request. Zantac has not worked in the past. Pointed out she may have a co-pay. May worsen with fosamax use.  - omeprazole (PRILOSEC) 20 MG DR capsule; Take 1 capsule (20 mg) by mouth daily  Dispense: 90 capsule; Refill: 0    5. Urinary incontinence in female: Incontinence 2 times daily while in the car during long rides to adult day care. Requesting depends.  - Incontinence Supply Disposable (DEPEND ADJUSTABLE UNDERWEAR) MISC; 1 Application 2 times daily as needed (Urinay incontinence)  Dispense: 180 each; Refill: 3    Schedule follow-up appointment in 2 month(s).    There are no discontinued medications.    Luis Alberto Maya MD  [facility] Resident  Pager# [phone]    Precepted with: Nancy Cook MD    Options for treatment and follow-up care were reviewed with the patient and/or guardian. See You and/or guardian engaged in the decision making process and verbalized understanding of the options discussed and agreed with the final plan    This chart is completed  utilizing dictation software; typos and/or incorrect word substitutions may unintentionally occur.

## 2019-05-24 NOTE — PATIENT INSTRUCTIONS
Important Takeaway Points From This Visit:    I have refilled your medications    Please bring in your fit test when you are able    I would like you to see your eye doctor    I have given you a prescription for adult diapers.      As always, please call with any questions or concerns. I look forward to seeing you again soon!    Take care,  Dr. Maya    Your current medication list is printed. Please keep this with you - it is helpful to bring this current list to any other medical appointments. It can also be helpful if you ever go to the emergency room or hospital.    If you had lab testing today we will call you with the results. The phone number we will call with your results is # [phone] (home) . If this is not the best number please call our clinic and change the number.    If you need any refills, please call your pharmacy and they will contact us.    If you have any further concerns or wish to schedule another appointment, please call our office at [phone] during normal business hours (8-5, M-F).    If you have urgent medical questions that cannot wait, you may call [phone] at any time of day.    If you have a medical emergency, please call 595.    Thank you for coming to [facility].      Referral for ( TEST )  :      Ophthalmology  LOCATION/PLACE/Provider :    [facility]  DATE & TIME :     June 17th at 10:35 am   PHONE :     [phone]  FAX :     [phone]  Appointment made by clinic staff/:    Laney

## 2019-05-31 ENCOUNTER — COMMUNICATION - HEALTHEAST (OUTPATIENT)
Dept: UROLOGY | Facility: CLINIC | Age: 74
End: 2019-05-31

## 2019-06-07 ENCOUNTER — TELEPHONE (OUTPATIENT)
Dept: FAMILY MEDICINE | Facility: CLINIC | Age: 74
End: 2019-06-07

## 2019-06-07 NOTE — TELEPHONE ENCOUNTER
[facility] phone call message- general phone call:    Reason for call: Caller would like to know if an appointment was scheduled for the referral to see an eye specialist. If appointment hasn't been scheduled then caller would like to assist with scheduling. Please call and advise.     Return call needed: Yes    OK to leave a message on voice mail? Yes    Primary language: ong      needed? Yes    Call taken on June 7, 2019 at 4:35 PM by Stephenie Squires

## 2019-06-10 ENCOUNTER — TELEPHONE (OUTPATIENT)
Dept: FAMILY MEDICINE | Facility: CLINIC | Age: 74
End: 2019-06-10

## 2019-06-10 NOTE — TELEPHONE ENCOUNTER
Left message via language line to call clinic back. Also tried calling the senior center phone number, and was not correct.  TNiles

## 2019-06-11 NOTE — TELEPHONE ENCOUNTER
Called Pete at [facility], not in yet. Caller will have her call when she gets in to discuss eye appointment for patient.

## 2019-06-11 NOTE — TELEPHONE ENCOUNTER
Referral for ( TEST )  :   Ophthalmology     LOCATION/PLACE/Provider :    [facility]  DATE & TIME :     June 17 th at 10:35am  PHONE :     [phone]  FAX :     [phone]    Appointment made by clinic staff/:    Laney

## 2019-06-12 DIAGNOSIS — Z12.11 SPECIAL SCREENING FOR MALIGNANT NEOPLASMS, COLON: ICD-10-CM

## 2019-06-12 DIAGNOSIS — Z12.11 SCREEN FOR COLON CANCER: Primary | ICD-10-CM

## 2019-06-12 LAB — HEMOCCULT STL QL IA: POSITIVE

## 2019-06-18 ENCOUNTER — TELEPHONE (OUTPATIENT)
Dept: FAMILY MEDICINE | Facility: CLINIC | Age: 74
End: 2019-06-18

## 2019-06-18 NOTE — TELEPHONE ENCOUNTER
Called patient with + FIT test results. Patient also mentioned having some abnl vaginal bleeding/spotting for about 1 week now. Patient agreeable to an appt for f/u and to discuss this. Scheduled for 6.20.19 with Dr. Connolly at 1:40.  ~ Janes ALBERT CMA ([place]i)

## 2019-06-18 NOTE — TELEPHONE ENCOUNTER
----- Message from Luis Alberto Maya MD sent at 6/12/2019  3:49 PM CDT -----  Regarding: RE: Gastroenterology referral   Call patient with lab results, and see if she would be interested in doing colonoscopy given positive fit test.  ----- Message -----  From: Iliana Jules  Sent: 6/12/2019   3:24 PM  To: Luis Alberto Maya MD  Subject: Gastroenterology referral                        Hello,      Wondering if you would like this referral to go to Dr. Jay, if so please let the medical assistant know so that they can call the patient to do a colonoscopy screening form.     Thank you,   Laeny

## 2019-06-18 NOTE — TELEPHONE ENCOUNTER
----- Message from Luis Alberto Maya MD sent at 6/12/2019  3:49 PM CDT -----  Regarding: RE: Gastroenterology referral   Call patient with lab results, and see if she would be interested in doing colonoscopy given positive fit test.  ----- Message -----  From: Iliana Jules  Sent: 6/12/2019   3:24 PM  To: Luis Alberto Maya MD  Subject: Gastroenterology referral                        Hello,      Wondering if you would like this referral to go to Dr. Jay, if so please let the medical assistant know so that they can call the patient to do a colonoscopy screening form.     Thank you,   Laney

## 2019-06-18 NOTE — RESULT ENCOUNTER NOTE
Reached by phone today. Results given to patient over the phone. Patient will come in on 6/20 to see Dr. Connolly for a f/u and has concerns about other issues.  ~ Janes ALBERT CMA (Chrissi)

## 2019-06-20 ENCOUNTER — RECORDS - HEALTHEAST (OUTPATIENT)
Dept: ADMINISTRATIVE | Facility: OTHER | Age: 74
End: 2019-06-20

## 2019-06-20 ENCOUNTER — OFFICE VISIT (OUTPATIENT)
Dept: FAMILY MEDICINE | Facility: CLINIC | Age: 74
End: 2019-06-20
Payer: COMMERCIAL

## 2019-06-20 VITALS
WEIGHT: 106 LBS | OXYGEN SATURATION: 99 % | TEMPERATURE: 98.2 F | RESPIRATION RATE: 18 BRPM | BODY MASS INDEX: 24.53 KG/M2 | DIASTOLIC BLOOD PRESSURE: 76 MMHG | HEART RATE: 60 BPM | HEIGHT: 55 IN | SYSTOLIC BLOOD PRESSURE: 146 MMHG

## 2019-06-20 DIAGNOSIS — Z12.11 SPECIAL SCREENING FOR MALIGNANT NEOPLASMS, COLON: Primary | ICD-10-CM

## 2019-06-20 DIAGNOSIS — Z12.11 SPECIAL SCREENING FOR MALIGNANT NEOPLASMS, COLON: ICD-10-CM

## 2019-06-20 DIAGNOSIS — N93.9 VAGINAL BLEEDING: Primary | ICD-10-CM

## 2019-06-20 LAB
BACTERIA: NORMAL
BILIRUBIN UR: NEGATIVE
BLOOD UR: ABNORMAL
CASTS: NEGATIVE /LPF
CLARITY, URINE: CLEAR
COLOR UR: YELLOW
CRYSTAL URINE: NEGATIVE /LPF
EPITHELIAL CELLS UR: <2 /LPF (ref 0–2)
GLUCOSE URINE: NEGATIVE
KETONES UR QL: NEGATIVE
LEUKOCYTE ESTERASE UR: NEGATIVE
MUCOUS URINE: NEGATIVE LPF
NITRITE UR QL STRIP: NEGATIVE
PH UR STRIP: 5.5 [PH] (ref 4.5–8)
PROTEIN UR: NEGATIVE
RBC URINE: NEGATIVE /HPF
SP GR UR STRIP: 1.01 (ref 1–1.03)
UROBILINOGEN UR STRIP-ACNC: ABNORMAL
WBC URINE: NEGATIVE /HPF

## 2019-06-20 RX ORDER — BISACODYL 5 MG/1
5 TABLET, DELAYED RELEASE ORAL DAILY PRN
Qty: 5 TABLET | Refills: 0 | Status: SHIPPED | OUTPATIENT
Start: 2019-06-20 | End: 2021-10-08

## 2019-06-20 RX ORDER — POLYETHYLENE GLYCOL 3350 17 G/17G
POWDER, FOR SOLUTION ORAL
Qty: 1 BOTTLE | Refills: 0 | Status: SHIPPED | OUTPATIENT
Start: 2019-06-20 | End: 2020-10-21

## 2019-06-20 RX ORDER — SIMETHICONE 80 MG
80 TABLET,CHEWABLE ORAL DAILY
Qty: 20 TABLET | Refills: 0 | Status: SHIPPED | OUTPATIENT
Start: 2019-06-20 | End: 2020-10-21

## 2019-06-20 ASSESSMENT — MIFFLIN-ST. JEOR: SCORE: 830.44

## 2019-06-20 NOTE — NURSING NOTE
"Chief Complaint   Patient presents with     Follow Up     + FIT testing     Vaginal Problem     Bleeding for about 3 months now.      Medication Reconciliation     completed.        /66   Pulse 60   Temp 98.2  F (36.8  C) (Oral)   Resp 18   Ht 1.385 m (4' 6.53\")   Wt 48.1 kg (106 lb)   SpO2 99%   BMI 25.07 kg/m          Due to patient being non-English speaking/uses sign language, an  was used for this visit. Only for face-to-face interpretation by an external agency, date and length of interpretation can be found on the scanned worksheet.       name: Froy Smith  Language: Hmong  Agency: [employer]  Phone number: [phone]  Type of interpretation: Face to Face, spoken      "

## 2019-06-20 NOTE — PROGRESS NOTES
Preceptor Attestation:   Patient seen, evaluated and discussed with the resident. I have verified the content of the note, which accurately reflects my assessment of the patient and the plan of care.  Supervising Physician:Derrek Nielsen MD  [facility]

## 2019-06-20 NOTE — PROGRESS NOTES
HPI:     See You is a 73 year old female who presents in follow up today to discuss a positive FIT test result as well as vaginal spotting.    Positive FIT test 19  - she has been called with a positive result and follows up today to discuss this, already agreeable to a colonoscopy  - denies weight loss, denies changes in bowel quality/consistency, appetite is good  - constipated approximately once per week and seems to be related to when she takes tylenol #3  - having some RUQ aching pain a couple times of month, not related to eating, thinks its related to when she had her gallbladder removed (2018)    Spotting  - noticed spotting on toilet paper after wiping once in February and again in  of this year  - only related to wiping and when she has episodes of constipation, denies any instances of spotting her underwear. Does believe this is from her vaginal area however.  - menopause in , no abnormal bleeding since.  - 5 children, 1 in [place] [facility] [place] and 4 in [place]  -   many years ago and she has not been sexually active since  - likes to be active and up and moving when able    /66 today though she reports that this is quite high compared to when she checks at home and at her adult program.    An  was used for this visit.          PMHX:     Patient Active Problem List   Diagnosis     Major depression     Esophageal reflux     Generalized osteoarthrosis, unspecified site     Hyperlipidemia     Osteoporosis     Prediabetes     Current Outpatient Medications   Medication Sig Dispense Refill     acetaminophen (TYLENOL) 500 MG tablet Take 1 tablet (500 mg) by mouth every 4 hours as needed for pain 90 tablet 3     alendronate (FOSAMAX) 70 MG tablet Take 1 tablet (70 mg) by mouth every 7 days Take 60 minutes before am meal with 8 oz. water. Remain upright for 30 minutes. 4 tablet 5     Nutritional Supplements (ENSURE HIGH PROTEIN) Take 1 Can by mouth 2 times daily 60 Can  "11     omeprazole (PRILOSEC) 20 MG DR capsule Take 1 capsule (20 mg) by mouth daily 90 capsule 0     betamethasone dipropionate (DIPROSONE) 0.05 % external cream Apply topically 2 times daily 45 g 0     cetirizine (ZYRTEC) 10 MG tablet Take 1 tablet (10 mg) by mouth daily 14 tablet 0     FLUoxetine (PROZAC) 20 MG capsule Take 1 capsule (20 mg) by mouth daily (Patient not taking: Reported on 6/20/2019) 90 capsule 1     Incontinence Supply Disposable (DEPEND ADJUSTABLE UNDERWEAR) MISC 1 Application 2 times daily as needed (Urinay incontinence) 180 each 3     Social History     Tobacco Use     Smoking status: Never Smoker     Smokeless tobacco: Never Used   Substance Use Topics     Alcohol use: No     Drug use: No     Social History     Social History Narrative    This January moved to [place] from [place], [place]. [place] she lived with her son and his family, however, in January they kicked her out of the house and she relocated to be near her brother here. Here she lives alone in a small apartment.      Allergies   Allergen Reactions     Nka [No Known Allergies]      No results found for this or any previous visit (from the past 24 hour(s)).         Review of Systems:     7 point ROS negative except as noted.           Physical Exam:     Vitals:    06/20/19 1331 06/20/19 1335   BP: 159/66 146/76   Pulse: 60    Resp: 18    Temp: 98.2  F (36.8  C)    TempSrc: Oral    SpO2: 99%    Weight: 48.1 kg (106 lb)    Height: 1.385 m (4' 6.53\")      Body mass index is 25.07 kg/m .    General: Alert, well-appearing female in NAD  HEENT: PERRL, moist oral mucus membranes  Pulm: CTA BL, no tachypnea  CV: RRR, no murmur  Abd: soft, NTND, no masses   Ext: Warm, well perfused, 2+ BL radial pulses, no LE edema  Skin: No rash on limited skin exam  Pelvic: No external genital lesions. No obvious vaginal tears. There is a comparatively pale lesion noted at the 12 O'clock position with some vascularity noted. A Pap smear was done " because of this.      Assessment and Plan     1. Postmenopausal Vaginal Bleeding: Seems more related to stooling habits. Colonoscopy ordered as below with positive fit test. Also check pap smear given exam findings, UA looking for urinary source, and pelvic ultrasound. Follow-up results and investigate further pending results.  - US PELVIS COMPLETE  - Urinalysis, Micro If ([facility] )  - GYN Cytology ([facility])    2. Special screening for malignant neoplasms, colon: Discussed positive FIT test result as well as the recommendation for a follow up colonoscopy; she was agreeable to this plan and we will schedule this at her convenience.      Schedule follow-up appointment in 2 week(s).    Options for treatment and follow-up care were reviewed with the patient and/or guardian. See You and/or guardian engaged in the decision making process and verbalized understanding of the options discussed and agreed with the final plan.    Tate Martell, MS3    I was present with the medical student who participated in the service and in the documentation of this note. I have verified the history and personally performed the physical exam and medical decision making, and have verified the content of the note, which accurately reflects my assessment of the patient and the plan of care.     Luis Alberto Maya MD  [facility] Medicine Resident  Pager# [phone]    Precepted with: Derrek Nielsen MD

## 2019-06-20 NOTE — PATIENT INSTRUCTIONS
Referral for ( TEST )  :      US Pelvis Complete  LOCATION/PLACE/Provider :    [facility]  DATE & TIME :     June 24th at 11:00 am  PHONE :     [phone]  FAX :     [phone]  Appointment made by clinic staff/:    Laney    Referral for ( TEST )  :      Colonoscopy  LOCATION/PLACE/Provider :    [facility] with Dr. Jay   DATE & TIME :     July 2nd at 10:45 am  PHONE :     [phone]  FAX :     [phone]  Appointment made by clinic staff/:    Laney

## 2019-06-21 LAB
FINAL DIAGNOSIS: NORMAL
HPV 16 DNA: NEGATIVE
HPV 18 DNA: NEGATIVE
HPV SOURCE: NORMAL
OTHER HR HPV: NEGATIVE
SPECIMEN DESCRIPTION: NORMAL

## 2019-06-24 ENCOUNTER — HOSPITAL ENCOUNTER (OUTPATIENT)
Dept: ULTRASOUND IMAGING | Facility: HOSPITAL | Age: 74
Discharge: HOME | End: 2019-06-24
Attending: INTERPRETER

## 2019-06-24 ENCOUNTER — RECORDS - HEALTHEAST (OUTPATIENT)
Dept: ADMINISTRATIVE | Facility: OTHER | Age: 74
End: 2019-06-24

## 2019-06-24 DIAGNOSIS — N93.9 VAGINA BLEEDING: ICD-10-CM

## 2019-06-27 ENCOUNTER — RECORDS - HEALTHEAST (OUTPATIENT)
Dept: ADMINISTRATIVE | Facility: OTHER | Age: 74
End: 2019-06-27

## 2019-06-27 LAB
CYTOLOGY CVX/VAG DOC THIN PREP: NORMAL
HIGH RISK?: NO
HPV REFLEX?: NORMAL
LAB AP ABNORMAL BLEEDING: YES
LAB AP BIRTH CONTROL/HORMONES: NORMAL
LAB AP CERVICAL APPEARANCE: NORMAL
LAB AP PATIENT STATUS: NORMAL
LAB AP PREVIOUS ABNL: NORMAL
LAB AP PREVIOUS NORMAL: NORMAL
LAST MENS PERIOD: 1988
PATH REPORT.COMMENTS IMP SPEC: NORMAL
PATH REPORT.COMMENTS IMP SPEC: NORMAL
SPECIMEN ADEQUACY:: NORMAL

## 2019-06-28 ENCOUNTER — TELEPHONE (OUTPATIENT)
Dept: FAMILY MEDICINE | Facility: CLINIC | Age: 74
End: 2019-06-28

## 2019-06-28 NOTE — TELEPHONE ENCOUNTER
----- Message from Iliana Jules sent at 6/28/2019  9:48 AM CDT -----  Regarding: Colonoscopy  See has her colonoscopy scheduled for July 2nd at 10:45am. Please arrive by 9:45am    Laney

## 2019-06-28 NOTE — TELEPHONE ENCOUNTER
Called pt via  line, left message to call back. Calling pt to remind her of her colonoscopy on Tuesday, 7/2/2019 and to see if pt has any questions regarding the prep. Also, calling to make sure the patient has someone to bring her to her appointment and pick her up after the procedure.

## 2019-07-19 ENCOUNTER — AMBULATORY - HEALTHEAST (OUTPATIENT)
Dept: LAB | Facility: HOSPITAL | Age: 74
End: 2019-07-19

## 2019-07-19 DIAGNOSIS — R34 LOW URINE OUTPUT: ICD-10-CM

## 2019-07-19 DIAGNOSIS — N20.0 URIC ACID NEPHROLITHIASIS: ICD-10-CM

## 2019-07-19 LAB
MAGNESIUM 24H UR-MRATE: 148 MG/24 HR (ref 75–150)
MAGNESIUM UR-MCNC: 12.1 MG/DL
SPECIMEN VOL UR: 1225 ML

## 2019-07-21 LAB
CALCIUM 24H UR-MRATE: 364 MG/24HR (ref 20–275)
CHLORIDE 24H UR-SRATE: 134 MMOL/24HR (ref 110–250)
CITRATE 24H UR-MCNC: 183 MG/24HR
CREATININE, 24 HR URINE - HISTORICAL: 1498.2 MG/24HR
OXALATE MG/SPEC: 26.1 MG/24HR (ref 7–44)
PH UR STRIP: 6 [PH] (ref 4.5–8)
PHOSPHORUS URINE MG/SPEC: 1090.3 MG/24HR
POTASSIUM 24H UR-SCNC: 52 MMOL/24HR (ref 30–90)
SODIUM 24H UR-SRATE: 137 MMOL/24HR (ref 40–217)
SPECIMEN VOL UR: 1225 ML
URIC ACID URINE MG/SPEC: 372 MG/24HR (ref 250–750)

## 2019-07-24 ENCOUNTER — COMMUNICATION - HEALTHEAST (OUTPATIENT)
Dept: UROLOGY | Facility: CLINIC | Age: 74
End: 2019-07-24

## 2019-08-05 ENCOUNTER — OFFICE VISIT (OUTPATIENT)
Dept: FAMILY MEDICINE | Facility: CLINIC | Age: 74
End: 2019-08-05
Payer: COMMERCIAL

## 2019-08-05 VITALS
SYSTOLIC BLOOD PRESSURE: 153 MMHG | TEMPERATURE: 98 F | HEART RATE: 56 BPM | DIASTOLIC BLOOD PRESSURE: 73 MMHG | RESPIRATION RATE: 20 BRPM | OXYGEN SATURATION: 99 % | HEIGHT: 55 IN | BODY MASS INDEX: 24.76 KG/M2 | WEIGHT: 107 LBS

## 2019-08-05 DIAGNOSIS — H81.12 BENIGN PAROXYSMAL POSITIONAL VERTIGO OF LEFT EAR: Primary | ICD-10-CM

## 2019-08-05 DIAGNOSIS — M15.0 PRIMARY OSTEOARTHRITIS INVOLVING MULTIPLE JOINTS: ICD-10-CM

## 2019-08-05 DIAGNOSIS — Z23 NEED FOR PNEUMOCOCCAL VACCINATION: ICD-10-CM

## 2019-08-05 DIAGNOSIS — E44.1 MILD MALNUTRITION (H): ICD-10-CM

## 2019-08-05 PROBLEM — H81.13 BENIGN PAROXYSMAL POSITIONAL VERTIGO DUE TO BILATERAL VESTIBULAR DISORDER: Status: ACTIVE | Noted: 2019-08-05

## 2019-08-05 RX ORDER — FEEDER CONTAINER WITH PUMP SET
1 EACH MISCELLANEOUS 2 TIMES DAILY
Qty: 60 CAN | Refills: 11 | Status: SHIPPED | OUTPATIENT
Start: 2019-08-05 | End: 2019-08-30

## 2019-08-05 RX ORDER — ACETAMINOPHEN 500 MG
500 TABLET ORAL EVERY 4 HOURS PRN
Qty: 90 TABLET | Refills: 3 | Status: SHIPPED | OUTPATIENT
Start: 2019-08-05 | End: 2021-11-29

## 2019-08-05 ASSESSMENT — PATIENT HEALTH QUESTIONNAIRE - PHQ9: SUM OF ALL RESPONSES TO PHQ QUESTIONS 1-9: 0

## 2019-08-05 ASSESSMENT — MIFFLIN-ST. JEOR: SCORE: 831.85

## 2019-08-05 NOTE — NURSING NOTE
Due to patient being non-English speaking/uses sign language, an  was used for this visit. Only for face-to-face interpretation by an external agency, date and length of interpretation can be found on the scanned worksheet.     name: Yessi Orta  Agency: [employer]  Language: Hmong   Telephone number: [phone]  Type of interpretation: Face-to-face, spoken     Julia Narayan CMA

## 2019-08-05 NOTE — PROGRESS NOTES
HPI       See You is a 73 year old female with PMH major depression and osteoporosis who presents for:    Dizziness  For past 1 month  Only when moves head  Has to sit for a minute when getting out of bed  No ringing in ears, nausea/vomiting, headache  No prior diagnosis of BPPV or previous canalith repositioning maneuvers    Left eye vision problem  Unilateral blurry vision  Seen for the same May 2019, and no change since then  No pain, redness or discharge  Does not wear contacts  Saw ophthalmology, pt reports eye rinsed, no improvement  Has return appt with ophthalmology in 2 weeks (8/20/2019)    Refill request  Tylenol (takes 1 tab in AM and 1 in PM, helps with generalized aches)  Ensure (1 can twice daily)    Social history: Lives alone.  [facility]  Adult  5 days.    [employer]  was required for this visit.    Patient Active Problem List   Diagnosis     Major depression     Esophageal reflux     Generalized osteoarthrosis, unspecified site     Hyperlipidemia     Osteoporosis     Prediabetes       Current Outpatient Medications   Medication Sig Dispense Refill     acetaminophen (TYLENOL) 500 MG tablet Take 1 tablet (500 mg) by mouth every 4 hours as needed for pain 90 tablet 3     Nutritional Supplements (ENSURE HIGH PROTEIN) Take 1 Can by mouth 2 times daily 60 Can 11     alendronate (FOSAMAX) 70 MG tablet Take 1 tablet (70 mg) by mouth every 7 days Take 60 minutes before am meal with 8 oz. water. Remain upright for 30 minutes. 4 tablet 5     betamethasone dipropionate (DIPROSONE) 0.05 % external cream Apply topically 2 times daily 45 g 0     bisacodyl (DULCOLAX) 5 MG EC tablet Take 1 tablet (5 mg) by mouth daily as needed for constipation 5 tablet 0     cetirizine (ZYRTEC) 10 MG tablet Take 1 tablet (10 mg) by mouth daily 14 tablet 0     Incontinence Supply Disposable (DEPEND ADJUSTABLE UNDERWEAR) MISC 1 Application 2 times daily as needed (Urinay incontinence) 180 each 3      "omeprazole (PRILOSEC) 20 MG DR capsule Take 1 capsule (20 mg) by mouth daily 90 capsule 0     polyethylene glycol (MIRALAX) powder Take as directed in colonoscopy prep instructions 1 Bottle 0     simethicone (MYLICON) 80 MG chewable tablet Take 1 tablet (80 mg) by mouth daily Take as directed for colonoscopy prep 20 tablet 0          Allergies   Allergen Reactions     Nka [No Known Allergies]             Review of Systems:   Complete 10-point ROS negative except as per HPI           Physical Exam:     Vitals:    08/05/19 1451   BP: (!) 153/73   Pulse: 56   Resp: 20   Temp: 98  F (36.7  C)   TempSrc: Oral   SpO2: 99%   Weight: 48.5 kg (107 lb)   Height: 1.38 m (4' 6.33\")     Body mass index is 25.49 kg/m .    GENERAL: healthy, alert and no distress  HEENT: EOMI, conjunctiva normal, PERRL, arcus senilis    Connor-Hallpike test resulting in reproduction of symptoms (marked dizziness) with head turned to left, no observable nystagmus.    Epley maneuver performed with subjective improvement in symptoms.  RESP: lungs clear to auscultation - no rales, no rhonchi, no wheezes  CV: regular rates and rhythm, normal S1 S2, no S3 or S4 and no murmur, no click or rub  ABDOMEN: soft, no tenderness  MS: extremities- no gross deformities noted, no edema  NEURO: ambulates without assistance, CN II-XII grossly intact    PHQ-9 SCORE 3/21/2018 7/27/2018 8/5/2019   PHQ-9 Total Score - - -   PHQ-9 Total Score 12 7 0       Assessment and Plan     See was seen today for dizziness and refill request.    Diagnoses and all orders for this visit:    Benign paroxysmal positional vertigo of left ear  Presenting with 1 month of dizziness exacerbated by movement of the head.  New Douglas-Hallpike test and Epley maneuver performed after explanation of the potential benefit of canalith repositioning maneuvers to the patient.  Connor-Hallpike indicative of L-sided BPPV, and Epley maneuver resulted in subjective improvement of symptoms.  Instructional handout " provided for repeating maneuvers at home.  Advised to return on an as-needed basis if symptoms persist.    Need for pneumococcal vaccination  -     Pneumococcal vaccine 23 valent PPSV23  (Pneumovax) [24549]    Primary osteoarthritis involving multiple joints  -     acetaminophen (TYLENOL) 500 MG tablet; Take 1 tablet (500 mg) by mouth every 4 hours as needed for pain    Mild malnutrition (H)  -     Nutritional Supplements (ENSURE HIGH PROTEIN); Take 1 Can by mouth 2 times daily    Options for treatment and follow-up care were reviewed with the patient and/or guardian. See You and/or guardian engaged in the decision making process and verbalized understanding of the options discussed and agreed with the final plan.  Precepted with Dr. Horacio Douglas MD  [facility] Resident  Pager [phone]

## 2019-08-05 NOTE — PATIENT INSTRUCTIONS
Patient Education        BPPV Treatment for Right Anterior Canal: Liberatory/Semont Maneuver      For informational purposes only. Not to replace the advice of your health care provider.  Copyright   2018 [employer]. All rights reserved.

## 2019-08-11 PROBLEM — H81.12 BENIGN PAROXYSMAL POSITIONAL VERTIGO OF LEFT EAR: Status: ACTIVE | Noted: 2019-08-05

## 2019-08-20 NOTE — PROGRESS NOTES
Preceptor Attestation:   Patient seen, evaluated and discussed with the resident. I have verified the content of the note, which accurately reflects my assessment of the patient and the plan of care.    I performed the Epley maneuvers on the left side per usual practice with improvement in symptoms.    Supervising Physician:Horacio Jay MD    [facility]

## 2019-08-21 ENCOUNTER — TRANSFERRED RECORDS (OUTPATIENT)
Dept: HEALTH INFORMATION MANAGEMENT | Facility: CLINIC | Age: 74
End: 2019-08-21

## 2019-08-21 ENCOUNTER — OFFICE VISIT - HEALTHEAST (OUTPATIENT)
Dept: UROLOGY | Facility: CLINIC | Age: 74
End: 2019-08-21

## 2019-08-21 DIAGNOSIS — Z87.442 HISTORY OF KIDNEY STONES: ICD-10-CM

## 2019-08-21 DIAGNOSIS — R82.991 HYPOCITRATURIA: ICD-10-CM

## 2019-08-21 DIAGNOSIS — N20.0 NEPHROLITHIASIS, URIC ACID: ICD-10-CM

## 2019-08-21 LAB
ALBUMIN UR-MCNC: NEGATIVE MG/DL
APPEARANCE UR: CLEAR
BILIRUB UR QL STRIP: NEGATIVE
COLOR UR AUTO: YELLOW
GLUCOSE UR STRIP-MCNC: NEGATIVE MG/DL
HGB UR QL STRIP: ABNORMAL
KETONES UR STRIP-MCNC: NEGATIVE MG/DL
LEUKOCYTE ESTERASE UR QL STRIP: NEGATIVE
NITRATE UR QL: NEGATIVE
PH UR STRIP: 5.5 [PH] (ref 5–8)
SP GR UR STRIP: <=1.005 (ref 1–1.03)
UROBILINOGEN UR STRIP-ACNC: ABNORMAL

## 2019-08-26 DIAGNOSIS — K21.9 GASTROESOPHAGEAL REFLUX DISEASE, ESOPHAGITIS PRESENCE NOT SPECIFIED: ICD-10-CM

## 2019-08-26 PROBLEM — N20.0 NEPHROLITHIASIS, URIC ACID: Status: ACTIVE | Noted: 2019-08-26

## 2019-08-26 PROBLEM — R82.991 HYPOCITRATURIA: Status: ACTIVE | Noted: 2019-08-26

## 2019-08-30 ENCOUNTER — AMBULATORY - HEALTHEAST (OUTPATIENT)
Dept: LAB | Facility: HOSPITAL | Age: 74
End: 2019-08-30

## 2019-08-30 DIAGNOSIS — N20.0 NEPHROLITHIASIS, URIC ACID: ICD-10-CM

## 2019-08-30 DIAGNOSIS — E44.1 MILD MALNUTRITION (H): ICD-10-CM

## 2019-08-30 LAB — PH UR STRIP: 6.5 [PH] (ref 4.5–8)

## 2019-08-30 RX ORDER — FEEDER CONTAINER WITH PUMP SET
1 EACH MISCELLANEOUS 2 TIMES DAILY
Qty: 60 CAN | Refills: 3 | Status: SHIPPED | OUTPATIENT
Start: 2019-08-30 | End: 2020-02-07

## 2019-08-30 NOTE — TELEPHONE ENCOUNTER
Message to physician: PATIENT REQUESTS TO GO BACK TO ENSURE ORIGINAL/NOT THE HIGH PROTEIN VANILLA    Date of last visit: 8/5/2019     Date of next visit if scheduled: Visit date not found      Last Comprehensive Metabolic Panel:  Sodium   Date Value Ref Range Status   02/16/2018 141.0 133.0 - 144.0 mmol/L Final     Potassium   Date Value Ref Range Status   02/16/2018 3.6 3.4 - 5.3 mmol/L Final     Chloride   Date Value Ref Range Status   02/16/2018 105.0 94.0 - 109.0 mmol/L Final     Carbon Dioxide   Date Value Ref Range Status   02/16/2018 25.0 20.0 - 32.0 mmol/L Final     Glucose   Date Value Ref Range Status   02/16/2018 106.0 60.0 - 109.0 mg/dL Final     Urea Nitrogen   Date Value Ref Range Status   02/16/2018 17.0 7.0 - 30.0 mg/dL Final     Creatinine   Date Value Ref Range Status   02/16/2018 0.6 0.6 - 1.3 mg/dL Final     GFR Estimate   Date Value Ref Range Status   07/08/2014 >60 >60 mL/min/1.73m2 Final     Calcium   Date Value Ref Range Status   02/16/2018 9.4 8.5 - 10.4 mg/dL Final       BP Readings from Last 3 Encounters:   08/05/19 (!) 153/73   06/20/19 146/76   05/24/19 117/72       Lab Results   Component Value Date    A1C 5.9 08/23/2018    A1C 6.1 06/29/2017                Please complete refill and CLOSE ENCOUNTER.  Closing the encounter signifies the refill is complete.

## 2019-09-23 DIAGNOSIS — E44.1 MILD MALNUTRITION (H): ICD-10-CM

## 2019-09-23 RX ORDER — LACTOSE-REDUCED FOOD
1 LIQUID (ML) ORAL 2 TIMES DAILY
Qty: 64 BOTTLE | Refills: 3 | Status: SHIPPED | OUTPATIENT
Start: 2019-09-23 | End: 2020-04-27

## 2019-09-23 RX ORDER — FEEDER CONTAINER WITH PUMP SET
1 EACH MISCELLANEOUS 2 TIMES DAILY
Qty: 60 CAN | Status: CANCELLED | OUTPATIENT
Start: 2019-09-23

## 2019-09-23 NOTE — TELEPHONE ENCOUNTER
Message to physician: Pt would like refill on Ensure drink. Pt doesn't like the high protein drink. She prefers the Original Ensure drink. Please fill.    Date of last visit: 8/5/2019     Date of next visit if scheduled: Visit date not found       Last Comprehensive Metabolic Panel:  Sodium   Date Value Ref Range Status   02/16/2018 141.0 133.0 - 144.0 mmol/L Final     Potassium   Date Value Ref Range Status   02/16/2018 3.6 3.4 - 5.3 mmol/L Final     Chloride   Date Value Ref Range Status   02/16/2018 105.0 94.0 - 109.0 mmol/L Final     Carbon Dioxide   Date Value Ref Range Status   02/16/2018 25.0 20.0 - 32.0 mmol/L Final     Glucose   Date Value Ref Range Status   02/16/2018 106.0 60.0 - 109.0 mg/dL Final     Urea Nitrogen   Date Value Ref Range Status   02/16/2018 17.0 7.0 - 30.0 mg/dL Final     Creatinine   Date Value Ref Range Status   02/16/2018 0.6 0.6 - 1.3 mg/dL Final     GFR Estimate   Date Value Ref Range Status   07/08/2014 >60 >60 mL/min/1.73m2 Final     Calcium   Date Value Ref Range Status   02/16/2018 9.4 8.5 - 10.4 mg/dL Final       BP Readings from Last 3 Encounters:   08/05/19 (!) 153/73   06/20/19 146/76   05/24/19 117/72       Lab Results   Component Value Date    A1C 5.9 08/23/2018    A1C 6.1 06/29/2017                Please complete refill and CLOSE ENCOUNTER.  Closing the encounter signifies the refill is complete.

## 2019-09-24 ENCOUNTER — COMMUNICATION - HEALTHEAST (OUTPATIENT)
Dept: UROLOGY | Facility: CLINIC | Age: 74
End: 2019-09-24

## 2019-11-27 DIAGNOSIS — K21.9 GASTROESOPHAGEAL REFLUX DISEASE, ESOPHAGITIS PRESENCE NOT SPECIFIED: ICD-10-CM

## 2019-12-16 DIAGNOSIS — M81.0 AGE-RELATED OSTEOPOROSIS WITHOUT CURRENT PATHOLOGICAL FRACTURE: ICD-10-CM

## 2019-12-17 RX ORDER — ALENDRONATE SODIUM 70 MG/1
70 TABLET ORAL
Qty: 25 TABLET | Refills: 0 | Status: SHIPPED | OUTPATIENT
Start: 2019-12-17 | End: 2020-06-03

## 2020-01-14 ENCOUNTER — COMMUNICATION - HEALTHEAST (OUTPATIENT)
Dept: UROLOGY | Facility: CLINIC | Age: 75
End: 2020-01-14

## 2020-01-27 ENCOUNTER — OFFICE VISIT - HEALTHEAST (OUTPATIENT)
Dept: UROLOGY | Facility: CLINIC | Age: 75
End: 2020-01-27

## 2020-01-27 ENCOUNTER — HOSPITAL ENCOUNTER (OUTPATIENT)
Dept: CT IMAGING | Facility: CLINIC | Age: 75
Discharge: HOME | End: 2020-01-27
Attending: PHYSICIAN ASSISTANT

## 2020-01-27 DIAGNOSIS — N20.0 URIC ACID NEPHROLITHIASIS: ICD-10-CM

## 2020-01-27 DIAGNOSIS — N20.0 NEPHROLITHIASIS, URIC ACID: ICD-10-CM

## 2020-01-27 DIAGNOSIS — R82.991 HYPOCITRATURIA: ICD-10-CM

## 2020-01-27 DIAGNOSIS — R34 LOW URINE OUTPUT: ICD-10-CM

## 2020-01-27 DIAGNOSIS — N20.0 CALCULUS OF KIDNEY: ICD-10-CM

## 2020-01-27 LAB
ALBUMIN UR-MCNC: NEGATIVE MG/DL
APPEARANCE UR: CLEAR
BILIRUB UR QL STRIP: NEGATIVE
COLOR UR AUTO: YELLOW
GLUCOSE UR STRIP-MCNC: NEGATIVE MG/DL
HGB UR QL STRIP: ABNORMAL
KETONES UR STRIP-MCNC: NEGATIVE MG/DL
LEUKOCYTE ESTERASE UR QL STRIP: NEGATIVE
NITRATE UR QL: NEGATIVE
PH UR STRIP: 5 [PH] (ref 5–8)
SP GR UR STRIP: >=1.03 (ref 1–1.03)
UROBILINOGEN UR STRIP-ACNC: ABNORMAL

## 2020-02-07 DIAGNOSIS — E44.1 MILD MALNUTRITION (H): ICD-10-CM

## 2020-02-07 RX ORDER — FEEDER CONTAINER WITH PUMP SET
1 EACH MISCELLANEOUS 2 TIMES DAILY
Qty: 60 CAN | Refills: 11 | Status: SHIPPED | OUTPATIENT
Start: 2020-02-07 | End: 2022-05-31

## 2020-04-20 ENCOUNTER — DOCUMENTATION ONLY (OUTPATIENT)
Dept: FAMILY MEDICINE | Facility: CLINIC | Age: 75
End: 2020-04-20

## 2020-04-23 NOTE — PROGRESS NOTES
Annual assessment via phone with Neil, her son Isaías and GLENNA Thomson, who is the Responsible Party. I completed the PCA assessment, as well as the waiver assessment.    Current Situation:   See is a 74 year old female who lives alone in a high rise building. Today we did the assessment by phone,with her son and GLENNA, who is the RP for the PCA assessmentt. She lived with them a few years back and they are supportive to her needs.     Activities of daily living (ADL)/instrumental activities of daily living (IADL) and functional issues:  Client needs help with the following ADL's: dressing (making sure she is wearing appropriate clothing), grooming and bathing, though she is shy to have someone help her. Family will be tehre when she bathes, and stands outside the bathroom in case she needs assistance.  Client needs help with the following IADL's: shopping, cooking, housekeeping, laundry, managing finances/bills and transportation  Client states she is unable to perform the above due to pain, limited ROM in her extremities and Major Depressive Disorder        Health concerns for today  No concerns today. Last clinic visit was in late 2019. She also had kidney stones last year but have had no issues since then.  Has patient fallen 2 or more times in the last year? No  Has patient fallen with injury in the last year? No     Cognition/mental health  She has an RP to help her make decisions. She is usually rather tearful during our visit, but this was hard to assess as the assessment was done via phone. She has been going to a [facility] therapist monthly, but this is on hold due to the CoVid-19 pandemic.      STARS/Med Adherence  Client is non-compliant with the following quality measures: MWV, Mammogram and Colon Cancer Screening. She had a positive FITT in 2019 and she was set to have a colonoscopy in the summer of 2019, but for some reason it did not happen.  Comments: Discussed and provided education about the above  preventative exams.     Client's Plan of Care consists of:  Adult day care (5 days/week-on hold), Life line, Personal care assistance (PCA) (1.75 hours per day) and Transportation. I will add homemaking 5hrs/week as she isn't going to the [facility]. Once she returns to the [facility], she will be over budget so I will have to reduce or terminate the service, and the family will still be helping on an informal basis.  DME (pullups, 2 per day).    Preethi Monterroso, ROBBIE  [phone]

## 2020-04-27 DIAGNOSIS — E44.1 MILD MALNUTRITION (H): ICD-10-CM

## 2020-04-27 RX ORDER — LACTOSE-REDUCED FOOD
1 LIQUID (ML) ORAL 2 TIMES DAILY
Qty: 64 BOTTLE | Refills: 4 | Status: SHIPPED | OUTPATIENT
Start: 2020-04-27 | End: 2020-05-07

## 2020-05-07 DIAGNOSIS — E44.1 MILD MALNUTRITION (H): ICD-10-CM

## 2020-05-07 RX ORDER — LACTOSE-REDUCED FOOD
1 LIQUID (ML) ORAL 2 TIMES DAILY
Qty: 64 BOTTLE | Refills: 3 | Status: SHIPPED | OUTPATIENT
Start: 2020-05-07 | End: 2020-10-21

## 2020-06-02 DIAGNOSIS — M81.0 AGE-RELATED OSTEOPOROSIS WITHOUT CURRENT PATHOLOGICAL FRACTURE: ICD-10-CM

## 2020-06-02 NOTE — TELEPHONE ENCOUNTER
Message to physician: Alendronate 70mg  Date of last visit: 8/5/2019     Date of next visit if scheduled: Visit date not found       Last Comprehensive Metabolic Panel:  Sodium   Date Value Ref Range Status   02/16/2018 141.0 133.0 - 144.0 mmol/L Final     Potassium   Date Value Ref Range Status   02/16/2018 3.6 3.4 - 5.3 mmol/L Final     Chloride   Date Value Ref Range Status   02/16/2018 105.0 94.0 - 109.0 mmol/L Final     Carbon Dioxide   Date Value Ref Range Status   02/16/2018 25.0 20.0 - 32.0 mmol/L Final     Glucose   Date Value Ref Range Status   02/16/2018 106.0 60.0 - 109.0 mg/dL Final     Urea Nitrogen   Date Value Ref Range Status   02/16/2018 17.0 7.0 - 30.0 mg/dL Final     Creatinine   Date Value Ref Range Status   02/16/2018 0.6 0.6 - 1.3 mg/dL Final     GFR Estimate   Date Value Ref Range Status   07/08/2014 >60 >60 mL/min/1.73m2 Final     Calcium   Date Value Ref Range Status   02/16/2018 9.4 8.5 - 10.4 mg/dL Final       BP Readings from Last 3 Encounters:   08/05/19 (!) 153/73   06/20/19 146/76   05/24/19 117/72       Lab Results   Component Value Date    A1C 5.9 08/23/2018    A1C 6.1 06/29/2017                Please complete refill and CLOSE ENCOUNTER.  Closing the encounter signifies the refill is complete.

## 2020-06-03 RX ORDER — ALENDRONATE SODIUM 70 MG/1
70 TABLET ORAL
Qty: 25 TABLET | Refills: 0 | Status: SHIPPED | OUTPATIENT
Start: 2020-06-03 | End: 2020-11-23

## 2020-10-21 ENCOUNTER — OFFICE VISIT (OUTPATIENT)
Dept: FAMILY MEDICINE | Facility: CLINIC | Age: 75
End: 2020-10-21
Payer: COMMERCIAL

## 2020-10-21 VITALS
BODY MASS INDEX: 22.4 KG/M2 | WEIGHT: 96.8 LBS | HEIGHT: 55 IN | RESPIRATION RATE: 16 BRPM | SYSTOLIC BLOOD PRESSURE: 123 MMHG | HEART RATE: 69 BPM | DIASTOLIC BLOOD PRESSURE: 73 MMHG | OXYGEN SATURATION: 97 % | TEMPERATURE: 97.4 F

## 2020-10-21 DIAGNOSIS — Z23 NEED FOR PROPHYLACTIC VACCINATION AND INOCULATION AGAINST INFLUENZA: ICD-10-CM

## 2020-10-21 DIAGNOSIS — H25.9 SENILE CATARACT OF LEFT EYE, UNSPECIFIED AGE-RELATED CATARACT TYPE: Primary | ICD-10-CM

## 2020-10-21 DIAGNOSIS — E44.1 MILD MALNUTRITION (H): ICD-10-CM

## 2020-10-21 PROCEDURE — G0008 ADMIN INFLUENZA VIRUS VAC: HCPCS | Performed by: STUDENT IN AN ORGANIZED HEALTH CARE EDUCATION/TRAINING PROGRAM

## 2020-10-21 PROCEDURE — 99213 OFFICE O/P EST LOW 20 MIN: CPT | Mod: 25 | Performed by: STUDENT IN AN ORGANIZED HEALTH CARE EDUCATION/TRAINING PROGRAM

## 2020-10-21 PROCEDURE — 90662 IIV NO PRSV INCREASED AG IM: CPT | Performed by: STUDENT IN AN ORGANIZED HEALTH CARE EDUCATION/TRAINING PROGRAM

## 2020-10-21 RX ORDER — LACTOSE-REDUCED FOOD
1 LIQUID (ML) ORAL 2 TIMES DAILY
Qty: 64 BOTTLE | Refills: 3 | Status: SHIPPED | OUTPATIENT
Start: 2020-10-21 | End: 2021-08-09

## 2020-10-21 RX ORDER — LACTOSE-REDUCED FOOD
1 LIQUID (ML) ORAL 2 TIMES DAILY
Qty: 64 BOTTLE | Refills: 3 | Status: SHIPPED | OUTPATIENT
Start: 2020-10-21 | End: 2020-10-21

## 2020-10-21 ASSESSMENT — MIFFLIN-ST. JEOR: SCORE: 770.33

## 2020-10-21 NOTE — PROGRESS NOTES
[facility] [address]  [place] [address]  Phone: [phone]  Fax: [phone]  Primary Provider: Tika Isbell  Pre-op Performing Provider: TIKA ISBELL    PREOPERATIVE EVALUATION:  Today's date: 10/21/2020    Neil Orta is a 75 year old female who presents for a preoperative evaluation.    Surgical Information:  Surgery/Procedure: Left eye Cataract  Surgery Location: [facility]  Surgeon: Dr. Phil Klein  Surgery Date: 10/26/2020  Time of Surgery: 8:00AM  Where patient plans to recover: At home with family  Fax number for surgical facility: [phone]    Type of Anesthesia Anticipated: Topical    Subjective     HPI related to upcoming procedure:   Patient reports blurry vision and sensitivity to light in L eye. Is being seen at [facility] for cataract surgery on 10/26/2020. Medications include alendronate for osteoporosis and tylenol PRN. Is in otherwise good health. Denies hx of surgery or cardiac dysfunction. Denies headache, dizziness, nausea, vomiting, abdominal pain, and SOB.      Preop Questions 10/21/2020   1. Have you ever had a heart attack or stroke? No   2. Have you ever had surgery on your heart or blood vessels, such as a stent placement, a coronary artery bypass, or surgery on an artery in your head, neck, heart, or legs? No   3. Do you have chest pain with activity? No   4. Do you have a history of  heart failure? No   5. Do you currently have a cold, bronchitis or symptoms of other infection? No   6. Do you have a cough, shortness of breath, or wheezing? No   7. Do you or anyone in your family have previous history of blood clots? No   8. Do you or does anyone in your family have a serious bleeding problem such as prolonged bleeding following surgeries or cuts? No   9. Have you ever had problems with anemia or been told to take iron pills? No   10. Have you had any abnormal blood loss such as black, tarry or bloody  stools, or abnormal vaginal bleeding? No   11. Have you ever had a blood transfusion? No   12. Are you willing to have a blood transfusion if it is medically needed before, during, or after your surgery? NO   13. Have you or any of your relatives ever had problems with anesthesia? No   14. Do you have sleep apnea, excessive snoring or daytime drowsiness? No   15. Do you have any artifical heart valves or other implanted medical devices like a pacemaker, defibrillator, or continuous glucose monitor? No   16. Do you have artificial joints? No   17. Are you allergic to latex? No     Health Care Directive:  Patient has a Health Care Directive on file      Preoperative Review of :   reviewed - no record of controlled substances prescribed.      Status of Chronic Conditions:  See problem list for active medical problems.  Problems all longstanding and stable, except as noted/documented.  See ROS for pertinent symptoms related to these conditions.      Review of Systems  Constitutional, neuro, ENT, endocrine, pulmonary, cardiac, gastrointestinal, genitourinary, musculoskeletal, integument and psychiatric systems are negative, except as otherwise noted.    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     Priority: Medium     Followed by [facility], prescribed potassium citrate       Nephrolithiasis, uric acid 08/26/2019     Priority: Medium     Followed by [facility], prescribed potassium citrate       Benign paroxysmal positional vertigo of left ear 08/05/2019     Priority: Medium     Prediabetes 07/02/2017     Priority: Medium     Osteoporosis 10/15/2015     Priority: Medium     Found on DEXA  9/26/15  - Femur T score -2.8 and -3  - spine -1.9  Continue alendronate until September 2020       Esophageal reflux 03/22/2015     Priority: Medium     Generalized osteoarthrosis, unspecified site 03/22/2015     Priority: Medium      Hyperlipidemia 03/22/2015     Priority: Medium     Diagnosis updated by automated process. Provider to review and confirm.       Major depression 08/07/2014     Priority: Medium      Past Medical History:   Diagnosis Date     Hyperlipidemia      Kidney stone      Osteoarthritis      Past Surgical History:   Procedure Laterality Date     CHOLECYSTECTOMY       Current Outpatient Medications   Medication Sig Dispense Refill     acetaminophen (TYLENOL) 500 MG tablet Take 1 tablet (500 mg) by mouth every 4 hours as needed for pain 90 tablet 3     alendronate (FOSAMAX) 70 MG tablet Take 1 tablet (70 mg) by mouth every 7 days Take 60 minutes before am meal with 8 oz. water. Remain upright for 30 minutes. 25 tablet 0     Incontinence Supply Disposable (DEPEND ADJUSTABLE UNDERWEAR) MISC 1 Application 2 times daily as needed (Urinay incontinence) 180 each 3     Nutritional Supplements (ENSURE HIGH PROTEIN) Take 1 Can by mouth 2 times daily 60 Can 11     Nutritional Supplements (ENSURE ORIGINAL) LIQD Take 1 Bottle by mouth 2 times daily 64 Bottle 3     omeprazole (PRILOSEC) 20 MG DR capsule Take 1 capsule (20 mg) by mouth daily 90 capsule 3     bisacodyl (DULCOLAX) 5 MG EC tablet Take 1 tablet (5 mg) by mouth daily as needed for constipation (Patient not taking: Reported on 10/21/2020) 5 tablet 0       Allergies   Allergen Reactions     Nka [No Known Allergies]         Social History     Tobacco Use     Smoking status: Never Smoker     Smokeless tobacco: Never Used   Substance Use Topics     Alcohol use: No     Family History   Problem Relation Age of Onset     Diabetes No family hx of      Coronary Artery Disease No family hx of      Breast Cancer No family hx of      Cancer - colorectal No family hx of      Ovarian Cancer No family hx of      Prostate Cancer No family hx of      Hypertension No family hx of      Other Cancer No family hx of      Mental Illness No family hx of      Cerebrovascular Disease No family hx of       "Anesthesia Reaction No family hx of      Asthma No family hx of      Osteoporosis No family hx of      Known Genetic Syndrome No family hx of      Obesity No family hx of      Unknown/Adopted No family hx of      History   Drug Use No         Objective     /73 (BP Location: Right arm, Cuff Size: Adult Regular)   Pulse 69   Temp 97.4  F (36.3  C) (Oral)   Resp 16   Ht 1.372 m (4' 6\")   Wt 43.9 kg (96 lb 12.8 oz)   SpO2 97%   BMI 23.34 kg/m      Physical Exam    GENERAL APPEARANCE: healthy, alert and no distress     EYES: Eyes grossly normal to inspection, PERRL and conjunctiva/corneas- arcus senilus OU     HENT: ear canals and TM's normal and nose and mouth without ulcers or lesions     NECK: no adenopathy, no asymmetry, masses, or scars and thyroid normal to palpation     RESP: lungs clear to auscultation - no rales, rhonchi or wheezes     CV: regular rates and rhythm, normal S1 S2, no S3 or S4 and no murmur, click or rub     ABDOMEN:  soft, nontender, no HSM or masses and bowel sounds normal     MS: extremities normal- no gross deformities noted, no evidence of inflammation in joints, FROM in all extremities.     NEURO: Normal strength and tone, sensory exam grossly normal, mentation intact and speech normal     PSYCH: mentation appears normal. and affect normal/bright     LYMPHATICS: No cervical adenopathy    No results for input(s): HGB, PLT, INR, NA, POTASSIUM, CR, A1C in the last 52228 hours.     Diagnostics:  No labs were ordered during this visit.  No EKG required for low risk surgery (cataract, skin procedure, breast biopsy, etc).    Revised Cardiac Risk Index (RCRI):  The patient has the following serious cardiovascular risks for perioperative complications:   - No serious cardiac risks = 0 points     RCRI Interpretation: 0 points: Class I (very low risk - 0.4% complication rate)       Assessment & Plan   The proposed surgical procedure is considered LOW risk.    Assessment & Plan     See was " seen today for pre-op exam, medication reconciliation and imm/inj.    Diagnoses and all orders for this visit:    Senile cataract of left eye, unspecified age-related cataract type  -     Asymptomatic COVID-19 Virus (Coronavirus) by PCR; Future  -     Asymptomatic COVID-19 Virus (Coronavirus) by PCR    Mild malnutrition (H)  -     Nutritional Supplements (ENSURE ORIGINAL) LIQD; Take 1 Bottle by mouth 2 times daily    Need for prophylactic vaccination and inoculation against influenza  -     FLUZONE HIGH DOSE 65+  [97085]         Immunization:  - Influenza vaccination today      No follow-ups on file.    Donna Douglas MD  [facility]    Risks and Recommendations:  The patient has the following additional risks and recommendations for perioperative complications:   - No identified additional risk factors other than previously addressed    Medication Instructions:  Patient is to take all scheduled medications on the day of surgery    RECOMMENDATION:  APPROVAL GIVEN to proceed with proposed procedure, without further diagnostic evaluation.      Carmine Mock, MS4  [employer] [facility] [employer]    I was present with the medical student who participated in the service and in the documentation of this note. I have verified the history and personally performed the physical exam and medical decision making, and have verified the content of the note, which accurately reflects my assessment of the patient and the plan of care.    Signed Electronically by: Donna Douglas MD    Copy of this evaluation report is provided to requesting physician.    Preop [facility] Preop Guidelines    Revised Cardiac Risk Index

## 2020-10-21 NOTE — NURSING NOTE
Due to patient being non-English speaking/uses sign language, an  was used for this visit. Only for face-to-face interpretation by an external agency, date and length of interpretation can be found on the scanned worksheet.     name: Roddy  Agency: [employer]  Language: Jagruti   Telephone number: [phone]  Type of interpretation: Telephone, spoken

## 2020-10-22 ENCOUNTER — TELEPHONE (OUTPATIENT)
Dept: FAMILY MEDICINE | Facility: CLINIC | Age: 75
End: 2020-10-22

## 2020-10-22 NOTE — TELEPHONE ENCOUNTER
[facility] phone call message- patient requesting results:    Test: Lab    Date of test: 10/23/2020    Additional Comments: Patient is to have Covid19 swab on 10/23/2020 and caller advise results need to be fax to them at [phone] when received as patient has surgery 10/26/2020    OK to leave a message on voice mail? Yes       Primary language: Hmong      needed? Yes    Call taken on October 22, 2020 at 9:26 AM by Nik Manley

## 2020-10-23 DIAGNOSIS — Z13.9 SCREENING FOR CONDITION: ICD-10-CM

## 2020-10-23 DIAGNOSIS — Z13.9 SCREENING FOR CONDITION: Primary | ICD-10-CM

## 2020-10-23 LAB
COVID-19 VIRUS PCR TO U OF MN - SOURCE: NORMAL
SARS-COV-2 RNA SPEC QL NAA+PROBE: NOT DETECTED

## 2020-10-23 PROCEDURE — 99207 PR NO BILLABLE SERVICE THIS VISIT: CPT

## 2020-11-23 DIAGNOSIS — M81.0 AGE-RELATED OSTEOPOROSIS WITHOUT CURRENT PATHOLOGICAL FRACTURE: ICD-10-CM

## 2020-11-23 NOTE — TELEPHONE ENCOUNTER
Message to physician:     Date of last visit: 10/21/2020     Date of next visit if scheduled:none    Last Comprehensive Metabolic Panel:  Sodium   Date Value Ref Range Status   02/16/2018 141.0 133.0 - 144.0 mmol/L Final     Potassium   Date Value Ref Range Status   02/16/2018 3.6 3.4 - 5.3 mmol/L Final     Chloride   Date Value Ref Range Status   02/16/2018 105.0 94.0 - 109.0 mmol/L Final     Carbon Dioxide   Date Value Ref Range Status   02/16/2018 25.0 20.0 - 32.0 mmol/L Final     Glucose   Date Value Ref Range Status   02/16/2018 106.0 60.0 - 109.0 mg/dL Final     Urea Nitrogen   Date Value Ref Range Status   02/16/2018 17.0 7.0 - 30.0 mg/dL Final     Creatinine   Date Value Ref Range Status   02/16/2018 0.6 0.6 - 1.3 mg/dL Final     GFR Estimate   Date Value Ref Range Status   07/08/2014 >60 >60 mL/min/1.73m2 Final     Calcium   Date Value Ref Range Status   02/16/2018 9.4 8.5 - 10.4 mg/dL Final       BP Readings from Last 3 Encounters:   10/21/20 123/73   08/05/19 (!) 153/73   06/20/19 146/76       Lab Results   Component Value Date    A1C 5.9 08/23/2018    A1C 6.1 06/29/2017                Please complete refill and CLOSE ENCOUNTER.  Closing the encounter signifies the refill is complete.

## 2020-11-24 RX ORDER — ALENDRONATE SODIUM 70 MG/1
70 TABLET ORAL
Qty: 25 TABLET | Refills: 1 | Status: SHIPPED | OUTPATIENT
Start: 2020-11-24 | End: 2020-12-09

## 2020-12-09 DIAGNOSIS — M81.0 AGE-RELATED OSTEOPOROSIS WITHOUT CURRENT PATHOLOGICAL FRACTURE: ICD-10-CM

## 2020-12-09 NOTE — TELEPHONE ENCOUNTER
Message to physician:     Date of last visit: 10/21/2020     Date of next visit if scheduled: None    Last Comprehensive Metabolic Panel:  Sodium   Date Value Ref Range Status   02/16/2018 141.0 133.0 - 144.0 mmol/L Final     Potassium   Date Value Ref Range Status   02/16/2018 3.6 3.4 - 5.3 mmol/L Final     Chloride   Date Value Ref Range Status   02/16/2018 105.0 94.0 - 109.0 mmol/L Final     Carbon Dioxide   Date Value Ref Range Status   02/16/2018 25.0 20.0 - 32.0 mmol/L Final     Glucose   Date Value Ref Range Status   02/16/2018 106.0 60.0 - 109.0 mg/dL Final     Urea Nitrogen   Date Value Ref Range Status   02/16/2018 17.0 7.0 - 30.0 mg/dL Final     Creatinine   Date Value Ref Range Status   02/16/2018 0.6 0.6 - 1.3 mg/dL Final     GFR Estimate   Date Value Ref Range Status   07/08/2014 >60 >60 mL/min/1.73m2 Final     Calcium   Date Value Ref Range Status   02/16/2018 9.4 8.5 - 10.4 mg/dL Final       BP Readings from Last 3 Encounters:   10/21/20 123/73   08/05/19 (!) 153/73   06/20/19 146/76       Lab Results   Component Value Date    A1C 5.9 08/23/2018    A1C 6.1 06/29/2017                Please complete refill and CLOSE ENCOUNTER.  Closing the encounter signifies the refill is complete.

## 2020-12-10 RX ORDER — ALENDRONATE SODIUM 70 MG/1
70 TABLET ORAL
Qty: 25 TABLET | Refills: 0 | Status: SHIPPED | OUTPATIENT
Start: 2020-12-10 | End: 2021-11-17

## 2021-03-23 NOTE — TELEPHONE ENCOUNTER
Called, spoke to pt, she states she will need to look for a ride first and call back to schedule an appt for medication refill (per task, medication refused).

## 2021-04-09 ENCOUNTER — DOCUMENTATION ONLY (OUTPATIENT)
Dept: FAMILY MEDICINE | Facility: CLINIC | Age: 76
End: 2021-04-09

## 2021-04-12 NOTE — PROGRESS NOTES
Annual assessment via phone with Neil, her son Isaías and GLENNA Thomson, who is the Responsible Party. I completed the PCA assessment, as well as the waiver assessment. There was an  with using the [employer].    Current Situation:   See is a 75 year old female who lives alone in a high rise building. Today, we did the assessment by phone,with her son and GLENNA, who is the RP for the PCA assessmentt. She lived with them a few years back and they are supportive to her needs.     Activities of daily living (ADL)/instrumental activities of daily living (IADL) and functional issues:  Client needs help with the following ADL's: dressing (making sure she is wearing appropriate clothing), grooming and bathing, though she is shy to have someone help her. Family will be there when she bathes, and stands outside the bathroom in case she needs assistance.  Client needs help with the following IADL's: shopping, cooking, housekeeping, laundry, managing finances/bills and transportation  Client states she is unable to perform the above due to pain, limited ROM in her extremities and Major Depressive Disorder      Health concerns for today  No new concerns today. She does have shoulder pain and limited ROM. She had kidney stones two years ago but have had no issues since then. She recently had both COVID 19 vaccinations. She had cataracts removed in October 2020.  Has patient fallen 2 or more times in the last year? No  Has patient fallen with injury in the last year? No     Cognition/mental health  She has an RP to help her make decisions. She is usually rather tearful during our visit, but this was hard to assess as the assessment was done via phone. She has been going to a [facility] therapist monthly, but this is on hold due to the CoVid-19 pandemic. The adult day care she use to attend is still closed but they provide virtual meetings and she enjoys these. They also provide her with a daily meal. She also staying  busy with watching YouTube videos of people gardening back in [facility], as well as attending her Religious virtually as well.     STARS/Med Adherence  Client is non-compliant with the following quality measures: MWV, Mammogram and Colon Cancer Screening. She had a positive FITT in 2019 and she was set to have a colonoscopy in the summer of 2019, but for some reason it did not happen.  Comments: Discussed and provided education about the above preventative exams.     Client's Plan of Care consists of:  Adult day care (5 days/week-in person is on hold, but they are providing virtual visits), Life line, Personal care assistance (PCA) (1.75 hours per day) and Transportation, Homemaking (5hrs/week), and DME (pullups, 2 per day).    Preethi Monterroso, ROBBIE  [phone]

## 2021-05-27 VITALS — SYSTOLIC BLOOD PRESSURE: 116 MMHG | HEART RATE: 65 BPM | TEMPERATURE: 97.9 F | DIASTOLIC BLOOD PRESSURE: 71 MMHG

## 2021-05-30 NOTE — TELEPHONE ENCOUNTER
Left message for patient's son Isaías's contact number to call back for stone prevention appointment scheduling at the [facility].

## 2021-05-31 NOTE — PROGRESS NOTES
Assessment/Plan:        Diagnoses and all orders for this visit:    Hypocitraturia  -     potassium citrate (UROCIT-K) 10 mEq (1,080 mg) SR tablet; Take 1 tablet (10 mEq total) by mouth 2 (two) times a day.  Dispense: 60 tablet; Refill: 1    Nephrolithiasis, uric acid  -     potassium citrate (UROCIT-K) 10 mEq (1,080 mg) SR tablet; Take 1 tablet (10 mEq total) by mouth 2 (two) times a day.  Dispense: 60 tablet; Refill: 1  -     Urine, PH; Future  -     CT Abdomen Pelvis Without Oral Without IV Contrast; Future; Expected date: 11/21/2019    History of kidney stones  -     Urinalysis Macroscopic      Stone Management Plan  [facility] Stone Management 11/14/2018 3/6/2019 8/21/2019   Urinary Tract Infection No suspicion of infection No suspicion of infection No suspicion of infection   Renal Colic Asymptomatic at this time Asymptomatic at this time Asymptomatic at this time   Renal Failure No suspicion of renal failure No suspicion of renal failure No suspicion of renal failure   Current CT date - - -   Right sided stones? - - -   R Stone Event - No current event No current event   Left sided stones? - - -   L Number of ureteral stones - - -   L GSD of ureteral stones - - -   L Location of ureteral stone - - -   L Number of kidney stones  - - -   L GSD of kidney stones - - -   L Hydronephrosis - - -   L Stone Event Resolved event No current event No current event   Diagnosis date - - -   L MET Status Passed - -   L Current Plan - - -             Subjective:      HPI  Ms. Neil Orta is a 73 y.o. H[place] female returning to the [facility] [place] for ongoing management of stone risk reduction.     She is a rapidly recurrent uric acid and calcium oxalate stone former who has not required stone clearance procedures. She has previously participated in stone risk evaluation and remains adherent to recommendations. She has identified modifiable stone risks including:  low urine volume, hypocitraturia and acidic urine. She  "has no identified non-modifiable stone risk factors.     She is asymptomatic at present. She denies symptoms of fever, chills, flank pain, nausea, vomiting, urinary frequency and dysuria.    Objectives for this encounter include evaluating impact of potassium citrate therapy; she has not been taking potassium citrate as prescribed because she experienced an \"allergic reaction\". She states she was taking the pill for 1 week and then had an episode while at home where she developed a headache, burning body pain, and blacked out. She regained consciousness promptly and was helped by a neighbor. She did not seek any follow up after the episode.     She proceeded with collecting her urine but at time of collection, was already off the potassium citrate for ~ 1 month.    One 24 hour urine collection demonstrates moderate low urine volume (1225 mL), creatinine (1498 mg), moderately elevated calcium (364 mg), sodium (137 mmol), moderate hypocitraturia (183 mg), and pH (6). Dietary journal demonstrates: is not available for review.    PLAN    72 yo H[place] F with aggressive stone recurrence, initiated on potassium citrate. Persistent risk factors of severely low urine volume and hypocitraturia; new hypercalciuria.    Recommend she reinitiate potassium citrate therapy at 10 mEq two times a day. She will call our office promptly if she develops any side effects. She will obtain urine pH after being on medication for 1 week. If tolerating, may need to titrate dose for goal pH of 6.5-7. She will initiate increased fluid consumption to generate at least 2,000 ml urine daily.    Put in CT scan for 3 months as a placeholder.    Patient also seen and examined by LEATHA Cao   Review of systems is negative except for HPI.    Past Medical History:   Diagnosis Date     Kidney stones     Required intervention       Past Surgical History:   Procedure Laterality Date     NM ERCP W/BIOPSY SINGLE/MULTIPLE         Current " Outpatient Medications   Medication Sig Dispense Refill     acetaminophen (TYLENOL) 500 MG tablet TAKE 1 PILL  500 MG  BY MOUTH EVERY 4 HOURS AS NEEDED FOR PAIN  YOG MOB TXHUA 4 TEEV NOJ 1 LUB TSHUAJ  11     alendronate (FOSAMAX) 70 MG tablet        FLUoxetine (PROZAC) 20 MG capsule        omeprazole (PRILOSEC) 20 MG capsule Take 20 mg by mouth daily before breakfast.       potassium citrate (UROCIT-K) 10 mEq (1,080 mg) SR tablet Take 1 tablet (10 mEq total) by mouth 2 (two) times a day. 60 tablet 1     No current facility-administered medications for this visit.        No Known Allergies    Social History     Socioeconomic History     Marital status: Single     Spouse name: Not on file     Number of children: Not on file     Years of education: Not on file     Highest education level: Not on file   Occupational History     Not on file   Social Needs     Financial resource strain: Not on file     Food insecurity:     Worry: Not on file     Inability: Not on file     Transportation needs:     Medical: Not on file     Non-medical: Not on file   Tobacco Use     Smoking status: Never Smoker     Smokeless tobacco: Never Used   Substance and Sexual Activity     Alcohol use: No     Drug use: Not on file     Sexual activity: Not on file   Lifestyle     Physical activity:     Days per week: Not on file     Minutes per session: Not on file     Stress: Not on file   Relationships     Social connections:     Talks on phone: Not on file     Gets together: Not on file     Attends Caodaism service: Not on file     Active member of club or organization: Not on file     Attends meetings of clubs or organizations: Not on file     Relationship status: Not on file     Intimate partner violence:     Fear of current or ex partner: Not on file     Emotionally abused: Not on file     Physically abused: Not on file     Forced sexual activity: Not on file   Other Topics Concern     Not on file   Social History Narrative     Not on file        Family History   Problem Relation Age of Onset     Kidney disease Neg Hx      Objective:      Physical Exam  Vitals:    08/21/19 1409   BP: 145/70   Pulse: 64   Temp: 98.2  F (36.8  C)     General - well developed, well nourished, appropriate for age. Appears no distress at this time  Abdomen - slender soft, non-tender, no hepatosplenomegaly, no masses.   - no flank tenderness, no suprapubic tenderness, kidney and bladder non-palpable  MSK - normal spinal curvature. no spinal tenderness. normal gait. muscular strength intact.  Psych - oriented to time, place, and person, normal mood and affect.      Labs   Urinalysis POC (Office):  Blood UA   Date Value Ref Range Status   01/16/2017 Moderate Negative Final   12/19/2016 Small Negative Final     Nitrite, UA   Date Value Ref Range Status   08/21/2019 Negative Negative Final   03/06/2019 Negative Negative Final   11/14/2018 Negative Negative Final     Leukocytes, UA    Date Value Ref Range Status   01/16/2017 Small (!) Negative Final   12/19/2016 Trace (!) Negative Final     pH UA   Date Value Ref Range Status   01/16/2017 5.5 4.5 - 8.0 Final   12/19/2016 5.0 4.5 - 8.0 Final       Lab Urinalysis:  Blood, UA   Date Value Ref Range Status   08/21/2019 Trace (!) Negative Final   03/06/2019 Small (!) Negative Final   11/14/2018 Small (!) Negative Final     Nitrite, UA   Date Value Ref Range Status   08/21/2019 Negative Negative Final   03/06/2019 Negative Negative Final   11/14/2018 Negative Negative Final     Leukocytes, UA   Date Value Ref Range Status   08/21/2019 Negative Negative Final   03/06/2019 Negative Negative Final   11/14/2018 Negative Negative Final     pH, UA   Date Value Ref Range Status   08/21/2019 5.5 5.0 - 8.0 Final   03/06/2019 5.0 5.0 - 8.0 Final   11/14/2018 5.0 5.0 - 8.0 Final    and Stone prevention labs   24 hour urine   Calcium, 24H Urine   Date Value Ref Range Status   07/19/2019 364 (H) 20 - 275 mg/24hr Final     Comment:        Hypercalciuria >350  Values are for persons with  average daily calcium intake  (600-800 mg/day)   01/14/2019 219 20 - 275 mg/24hr Final     Comment:       Hypercalciuria >350  Values are for persons with  average daily calcium intake  (600-800 mg/day)   11/28/2018 5 (L) 20 - 275 mg/24hr Final     Comment:       Hypercalciuria >350  Values are for persons with  average daily calcium intake  (600-800 mg/day)     Sodium, 24 Hour Urine   Date Value Ref Range Status   07/19/2019 137 40 - 217 mmol/24hr Final   01/14/2019 120 40 - 217 mmol/24hr Final   11/28/2018 4 (L) 40 - 217 mmol/24hr Final     Citrate, 24 Hour Urine   Date Value Ref Range Status   07/19/2019 183 (L) 434-1,191 mg/24hr Final     Comment:       Reference Ranges are not  established for 0-19 years  or >60 years of age.   01/14/2019 402 (L) 434-1,191 mg/24hr Final     Comment:       Reference Ranges are not  established for 0-19 years  or >60 years of age.   11/28/2018 27 (L) 434-1,191 mg/24hr Final     Comment:       Reference Ranges are not  established for 0-19 years  or >60 years of age.     Oxalate, 24 Hour Urine   Date Value Ref Range Status   07/19/2019 26.1 7.0 - 44.0 mg/24hr Final   01/14/2019 22.3 7.0 - 44.0 mg/24hr Final   11/28/2018 7.1 7.0 - 44.0 mg/24hr Final     pH, Urine   Date Value Ref Range Status   07/19/2019 6.0 4.5 - 8.0 Final   01/14/2019 5.5 4.5 - 8.0 Final   11/28/2018 6.0 4.5 - 8.0 Final     Urine Volume   Date Value Ref Range Status   07/19/2019 1,225 mL Final   01/14/2019 1,000 mL Final   11/28/2018 150 mL Final

## 2021-05-31 NOTE — PATIENT INSTRUCTIONS - HE
INCREASING FLUIDS TO DECREASE RISK    Low Urinary Volume:     Kidney stones form because there is not enough water to dissolve the concentrated chemicals and minerals in urine.     Studies have found that people who make kidney stones should drink enough fluids so that they produce at least 2 liters (2 quarts) of urine per day.     Studies have shown that virtually every fluid you might drink decreases the risk of stone formation. Acceptable fluids include milk, coffee, diet and regular sodas, alcoholic beverages, fruit juices and even plain old water.    Increasing fluid intake will increase urine volume and decrease the chance of kidney stone formation.    Fluids:    Anything liquid that fits in a glass counts.     One way to gauge if your body has enough fluids is to check the color of your urine. If the urine is dark and yellow you do not have enough fluids. Urine will be pale yellow to clear if your body has enough fluids.    What we need to survive:    Most fluid we drink is lost through evaporation, more if active in hot humid environments    Body wastes can be cleared in a small amount of urine    All fluid that is consumed in excess of necessary losses  dilutes the urine    Ways to Increase Fluids Daily:    Drink more fluids throughout the day and into the evening. (You may need to awake from sleep to urinate which is a good indication of volume status)    Keep your refrigerator stocked with beverages you like    Drink a variety of fluids - mix it up    Add another beverage to your regular beverage at every meal    Keep a beverage at your desk (work area) and finish it before you leave for breaks, meetings, lunch and end of day    Use larger volume glasses    Whenever you pass a water fountain - stop and drink    Drink a beverage with snacks, if it is a salty snack, double the beverage    Take medication with a full glass of water/fluid    Keep a bottle of water in your car and drink every 20 miles    Eat  high water content fruits (melons, oranges, grapes, etc.)    Flavor water with a squeeze of lemon or lime or Crystal Light     If you do something repetitive throughout the day, link it with having something to drink    When you give your child/children something to drink, you have something to drink also    The [facility] [place] can respond to your questions or concerns 24 hours a day at [phone].    Potassium Citrate    This medication can be used in two ways to help stone formers.      Citrate in the urine makes it harder for stones to form and grow. Many stone patients do not have enough citrate in their urine and require a supplement.        Citrate is an effective way to make the urine less acidic. Uric acid stones only form when the urine is acidic and may dissolve if urine acidity can be reversed.      USES: This medication is used for low urinary citrate (hypocitraturia), renal tubular acidosis, uric acid stones, calcium oxalate stones, and cysteine stones.    HOW TO USE:     Potassium citrate comes in several different forms      Tablet - made of potassium citrate in a wax substance to allow delayed absorption. It is common to see a  ghost tablet  in the stool after all the potassium citrate has been absorbed. This is more common in people with diarrhea or other bowel disorders.      Powder - this can be dissolved in water or other clear fluids. To decrease stomach irritation and improve absorption, we recommend dissolving in at least 500 cc of fluid and drinking slowly. You do not have to drink this medication all at once. Many patients find the taste is improved by adding a sweetener.      Liquid - Similar to the powder, the liquid formulation is best dissolved in a large container of fluid and consumed slowly.     SIDE EFFECTS:  The primary side effect of this medication is stomach irritation, less so with the tablet. Some people find stomach irritation decreased by taking the medication with  food and avoiding lying down for at least a half hour after consumption.    If you develop diarrhea, nausea, vomiting, stomach pain, fluid retention, convulsions, unusual weakness, mental confusion, tingling or numbness of the hands or feet or other potential side effects, notify your doctor or pharmacist promptly.                    *This is a summary and does not contain all possible information about this product. For complete information about this product or your specific health needs, ask your healthcare professional. Always seek the advice of your healthcare professional if you have any questions about this product or your medical condition.

## 2021-06-01 NOTE — TELEPHONE ENCOUNTER
Left message on patient's son Isaías's contact number to have him relay message that patient's recent ph urin level is getting better with the potassium citrate medication intake.  Patient is to continue with same dosage and will see patient in 3 months from last office visit.  Attempted to call patient but could not reach her.    12:09 p.m.  Patient's son called back and left message.  He will be in contact with patient and give her the information.

## 2021-06-05 NOTE — TELEPHONE ENCOUNTER
Spoke to patient regarding overdue stone and prevention follow up appointment.  Scheduled patient for 1/27/2020 CT at 10:20 a.m. and [facility] at 10:40 a.m.  Appointment detail letter was mailed to patient per her request so she can have [facility] arrange [facility].

## 2021-06-05 NOTE — PROGRESS NOTES
Assessment/Plan:        Diagnoses and all orders for this visit:    Low urine output  -     Urinalysis Macroscopic    Calculus of kidney  -     CT Abdomen Pelvis Without Oral Without IV Contrast; Future; Expected date: 01/26/2021    Hypocitraturia  -     Potassium Citrate Education    Uric acid nephrolithiasis  -     CT Abdomen Pelvis Without Oral Without IV Contrast; Future; Expected date: 01/26/2021  -     Patient Stated Goal: Prevent further stones    Other orders  -     potassium citrate (UROCIT-K) 10 mEq (1,080 mg) SR tablet; 10 mEq 2 (two) times a day.       Stone Management Plan  [facility] Stone Management 3/6/2019 8/21/2019 1/27/2020   Urinary Tract Infection No suspicion of infection No suspicion of infection No suspicion of infection   Renal Colic Asymptomatic at this time Asymptomatic at this time Asymptomatic at this time   Renal Failure No suspicion of renal failure No suspicion of renal failure No suspicion of renal failure   Current CT date - - 1/27/2020   Right sided stones? - - Yes   R Number of ureteral stones - - No ureteral stones   R Number of kidney stones  - - 1   R GSD of kidney stones - - 2 - 4   R Hydronephrosis - - None   R Stone Event No current event No current event No current event   R Current Plan - - Observe   Observe rationale - - Limited stone burden with good prognosis for spontaneous passage   Left sided stones? - - No   L Number of ureteral stones - - -   L GSD of ureteral stones - - -   L Location of ureteral stone - - -   L Number of kidney stones  - - -   L GSD of kidney stones - - -   L Hydronephrosis - - -   L Stone Event No current event No current event No current event   Diagnosis date - - -   L MET Status - - -   L Current Plan - - -             Subjective:      HPI  Ms. Neil Orta is a 74 y.o. [place] female returning to the [facility] [place] for long-term stone management.     She is a rapidly recurrent calcium oxalate and uric acid stone former who has not  required stone clearance procedures. She has previously participated in stone risk evaluation and remains adherent to recommendations. She has identified modifiable stone risks including:  low urine volume, hypocitraturia and acidic urine. She has no identified non-modifiable stone risk factors.     She has done well since last encounter without any stone events. She is asymptomatic at present. She denies symptoms of fever, chills, flank pain, nausea, vomiting, urinary frequency and dysuria.    When asked, she states that she has been very rigorous in adherence to prescribed stone risk reduction strategy. She rates her overall success as generally good. She has been taking potassium citrate 10 mEq two times a day and is tolerating well.     New CT scan is personally reviewed and demonstrates a 2 mm right upper pole renal stone. No left sided stones.     Urine pH today is 5.    PLAN    72 yo [place] F with aggressive stone recurrence and risk factors of severely low urine volume and hypocitraturia, maintained on potassium citrate; hypercalciuria.    Could benefit from increasing citrate to drive up urine pH and possibly dissolve current renal stone. Will increase potassium citrate therapy to 20 mEq twice daily. Return in 12 months with imaging.    Patient also seen and examined by Jessica Roa PA-C       ROS   Review of systems is negative except for HPI.    Past Medical History:   Diagnosis Date     Kidney stones     Required intervention       Past Surgical History:   Procedure Laterality Date     SD ERCP W/BIOPSY SINGLE/MULTIPLE         Current Outpatient Medications   Medication Sig Dispense Refill     potassium citrate (UROCIT-K) 10 mEq (1,080 mg) SR tablet 10 mEq 2 (two) times a day.        acetaminophen (TYLENOL) 500 MG tablet TAKE 1 PILL  500 MG  BY MOUTH EVERY 4 HOURS AS NEEDED FOR PAIN  YOG MOB TXHUA 4 TEEV NOJ 1 LUB TSHUAJ  11     alendronate (FOSAMAX) 70 MG tablet        FLUoxetine (PROZAC) 20 MG capsule         omeprazole (PRILOSEC) 20 MG capsule Take 20 mg by mouth daily before breakfast.       No current facility-administered medications for this visit.        No Known Allergies    Social History     Socioeconomic History     Marital status: Single     Spouse name: Not on file     Number of children: Not on file     Years of education: Not on file     Highest education level: Not on file   Occupational History     Not on file   Social Needs     Financial resource strain: Not on file     Food insecurity:     Worry: Not on file     Inability: Not on file     Transportation needs:     Medical: Not on file     Non-medical: Not on file   Tobacco Use     Smoking status: Never Smoker     Smokeless tobacco: Never Used   Substance and Sexual Activity     Alcohol use: No     Drug use: Not on file     Sexual activity: Not on file   Lifestyle     Physical activity:     Days per week: Not on file     Minutes per session: Not on file     Stress: Not on file   Relationships     Social connections:     Talks on phone: Not on file     Gets together: Not on file     Attends Pentecostalism service: Not on file     Active member of club or organization: Not on file     Attends meetings of clubs or organizations: Not on file     Relationship status: Not on file     Intimate partner violence:     Fear of current or ex partner: Not on file     Emotionally abused: Not on file     Physically abused: Not on file     Forced sexual activity: Not on file   Other Topics Concern     Not on file   Social History Narrative     Not on file       Family History   Problem Relation Age of Onset     Kidney disease Neg Hx      Objective:      Physical Exam  Vitals:    01/27/20 1050   BP: 116/71   Pulse: 65   Temp: 97.9  F (36.6  C)     General - well developed, well nourished, appropriate for age. Appears no distress at this time  Abdomen - slender soft, non-tender, no hepatosplenomegaly, no masses.   - no flank tenderness, no suprapubic tenderness, kidney  and bladder non-palpable  MSK - normal spinal curvature. no spinal tenderness. normal gait. muscular strength intact.  Psych - oriented to time, place, and person, normal mood and affect.    Labs   Urinalysis POC (Office):  Blood UA   Date Value Ref Range Status   01/16/2017 Moderate Negative Final   12/19/2016 Small Negative Final     Nitrite, UA   Date Value Ref Range Status   01/27/2020 Negative Negative Final   08/21/2019 Negative Negative Final   03/06/2019 Negative Negative Final     Leukocytes, UA    Date Value Ref Range Status   01/16/2017 Small (!) Negative Final   12/19/2016 Trace (!) Negative Final     pH UA   Date Value Ref Range Status   01/16/2017 5.5 4.5 - 8.0 Final   12/19/2016 5.0 4.5 - 8.0 Final       Lab Urinalysis:  Blood, UA   Date Value Ref Range Status   01/27/2020 Trace (!) Negative Final   08/21/2019 Trace (!) Negative Final   03/06/2019 Small (!) Negative Final     Nitrite, UA   Date Value Ref Range Status   01/27/2020 Negative Negative Final   08/21/2019 Negative Negative Final   03/06/2019 Negative Negative Final     Leukocytes, UA   Date Value Ref Range Status   01/27/2020 Negative Negative Final   08/21/2019 Negative Negative Final   03/06/2019 Negative Negative Final     pH, UA   Date Value Ref Range Status   01/27/2020 5.0 5.0 - 8.0 Final   08/30/2019 6.5 4.5 - 8.0 Final   08/21/2019 5.5 5.0 - 8.0 Final

## 2021-06-09 ENCOUNTER — RECORDS - HEALTHEAST (OUTPATIENT)
Dept: BONE DENSITY | Facility: CLINIC | Age: 76
End: 2021-06-09

## 2021-06-09 DIAGNOSIS — M81.0 AGE-RELATED OSTEOPOROSIS WITHOUT CURRENT PATHOLOGICAL FRACTURE: ICD-10-CM

## 2021-06-21 NOTE — PROGRESS NOTES
Assessment/Plan:        Diagnoses and all orders for this visit:    Urinary tract stones  -     Urinalysis Macroscopic  -     ibuprofen (ADVIL,MOTRIN) 200 MG tablet; Take 2 tablets (400 mg total) by mouth 4 (four) times a day for 7 days.  Dispense: 56 tablet; Refill: 0  -     acetaminophen (TYLENOL) 500 MG tablet; Take 2 tablets (1,000 mg total) by mouth 4 (four) times a day for 7 days.  Dispense: 56 tablet; Refill: 0  -     dimenhyDRINATE (DRAMAMINE) 50 MG tablet; Take 1 tablet (50 mg total) by mouth at bedtime for 7 days.  Dispense: 7 tablet; Refill: 0  -     oxyCODONE (ROXICODONE) 5 MG immediate release tablet; Every 4-6 hours as needed if pain is not improved with acetaminophen and ibuprofen.  Dispense: 20 tablet; Refill: 0  -     Culture, Urine  -     XR Abdomen AP; Future; Expected date: 11/7/18  -     XR Abdomen AP      Stone Management Plan  [facility] Stone Management 12/19/2016 1/16/2017 11/7/2018   Urinary Tract Infection No suspicion of infection No suspicion of infection No suspicion of infection   Renal Colic Asymptomatic at this time Asymptomatic at this time Well controlled symptoms   Renal Failure No suspicion of renal failure No suspicion of renal failure No suspicion of renal failure   Current CT date 12/16/2016 - 11/4/2018   Right sided stones? No - No   R Stone Event No current event No current event No current event   Left sided stones? Yes - Yes   L Number of ureteral stones 1 - 1   L GSD of ureteral stones 5 - 7   L Location of ureteral stone Distal - Distal   L Number of kidney stones  No renal stones - No renal stones   L GSD of kidney stones N/A - N/A   L Hydronephrosis Moderate - Moderate   L Stone Event New stone passed prior to visit No current event New event   Diagnosis date - - 11/4/2018   L Current Plan - - MET             Subjective:      HPI  Ms. Neil Orta is a 73 y.o. [place] female returning to the [facility] for unanticipated visit with acute exacerbation of chronic  stone disease.      She is a recurrent calcium oxalate and uric acid stone former who has not required stone clearance procedures.  LAst passed a stone 2 years ago, 5 mm left distal ureteral.  Was doing well until this past weekend when she experienced acute onsent left sided back pain that janeth there to ED>  In ED a CT was orderd showing moderate left hydronephrosis and a 7 mm left UVJ stone.  Images personally reviewed.  She was discharged on pain medication as well as a one week course of keflex for borderlne UA.  She says she has not yet seen the stone pass.  She denies fevers, chills, hematuria, dysuria.  She does have intermittent crampy left lower quadrant and flank pain.   Pain appropriately managed with pain medication.    Discussed options of ureteroscopy vs. Trial of passage.  She elects for trial of passage.    Urinalysis today is reviewed, trace LE otherwise wnl.  Ucx from earlier this week shows mixed froilan, possible contaminant.    Stone appears radiopaque on prior  image of CT.  Will confirm with KUB today.    Will proceed with medical expulsive therapy. Risks and benefits were detailed of medical expulsive therapy including probability of stone passage, recurrent renal colic, and requirement of emergency medical and/or surgical care and further imaging. Patient verbalized understanding. Patient agrees with plan as discussed. She will return in 1 weeks with KUB    Tylenol, Ibuprofen, Oxycodone, dramamine all renewed.     Advised to return to clinic or ED should she have progression of symptoms or sings of infection     ROS   A 12 point comprehensive review of systems is negative except for HPI.    Past Medical History:   Diagnosis Date     Kidney stones     Required intervention       Past Surgical History:   Procedure Laterality Date     [place] ERCP W/BIOPSY SINGLE/MULTIPLE         Current Outpatient Prescriptions   Medication Sig Dispense Refill     acetaminophen (TYLENOL) 500 MG tablet Take 2  tablets (1,000 mg total) by mouth 4 (four) times a day for 7 days. 56 tablet 0     dimenhyDRINATE (DRAMAMINE) 50 MG tablet Take 1 tablet (50 mg total) by mouth at bedtime for 7 days. 7 tablet 0     ibuprofen (ADVIL,MOTRIN) 200 MG tablet Take 2 tablets (400 mg total) by mouth 4 (four) times a day for 7 days. 56 tablet 0     oxyCODONE (ROXICODONE) 5 MG immediate release tablet Every 4-6 hours as needed if pain is not improved with acetaminophen and ibuprofen. 20 tablet 0     No current facility-administered medications for this visit.        No Known Allergies    Social History     Social History     Marital status: Single     Spouse name: N/A     Number of children: N/A     Years of education: N/A     Occupational History     Not on file.     Social History Main Topics     Smoking status: Never Smoker     Smokeless tobacco: Not on file     Alcohol use No     Drug use: Not on file     Sexual activity: Not on file     Other Topics Concern     Not on file     Social History Narrative       Family History   Problem Relation Age of Onset     Kidney disease Neg Hx      Objective:      Physical Exam  Vitals:    11/07/18 1035   BP: 121/64   Pulse: 77   Temp: 98.2  F (36.8  C)     General - well developed, well nourished, appropriate for age. Appears no distress at this time  Abdomen - slender soft, non-tender, no hepatosplenomegaly, no masses.   - no flank tenderness, no suprapubic tenderness, kidney and bladder non-palpable  MSK - normal spinal curvature. no spinal tenderness. normal gait. muscular strength intact.  Psych - oriented to time, place, and person, normal mood and affect.      Labs   Urinalysis POC (Office):  Blood UA   Date Value Ref Range Status   01/16/2017 Moderate Negative Final   12/19/2016 Small Negative Final     Nitrite, UA   Date Value Ref Range Status   11/07/2018 Negative Negative Final   11/04/2018 Negative Negative Final   01/16/2017 Negative Negative Final   12/19/2016 Negative Negative Final    12/16/2016 Negative Negative Final     Leukocytes, UA    Date Value Ref Range Status   01/16/2017 Small (!) Negative Final   12/19/2016 Trace (!) Negative Final     pH UA   Date Value Ref Range Status   01/16/2017 5.5 4.5 - 8.0 Final   12/19/2016 5.0 4.5 - 8.0 Final       Lab Urinalysis:  Blood, UA   Date Value Ref Range Status   11/07/2018 Moderate (!) Negative Final   11/04/2018 Large (!) Negative Final   12/16/2016 Large (!) Negative Final     Nitrite, UA   Date Value Ref Range Status   11/07/2018 Negative Negative Final   11/04/2018 Negative Negative Final   01/16/2017 Negative Negative Final   12/19/2016 Negative Negative Final   12/16/2016 Negative Negative Final     Leukocytes, UA   Date Value Ref Range Status   11/07/2018 Trace (!) Negative Final   11/04/2018 Small (!) Negative Final   12/16/2016 Trace (!) Negative Final     pH, UA   Date Value Ref Range Status   11/07/2018 5.5 5.0 - 8.0 Final   11/04/2018 6.5 4.5 - 8.0 Final   12/16/2016 5.5 4.5 - 8.0 Final    and Acute Labs   CBC   WBC   Date Value Ref Range Status   11/04/2018 13.6 (H) 4.0 - 11.0 thou/uL Final   12/16/2016 12.9 (H) 4.0 - 11.0 thou/uL Final     Hemoglobin   Date Value Ref Range Status   11/04/2018 14.4 12.0 - 16.0 g/dL Final   12/16/2016 14.1 12.0 - 16.0 g/dL Final     Platelets   Date Value Ref Range Status   11/04/2018 191 140 - 440 thou/uL Final   12/16/2016 212 140 - 440 thou/uL Final    and Renal Panel  [facility]  Creatinine   Date Value Ref Range Status   11/04/2018 0.85 0.60 - 1.10 mg/dL Final   12/19/2016 0.90 0.60 - 1.10 mg/dL Final   12/16/2016 0.84 0.60 - 1.10 mg/dL Final     Potassium   Date Value Ref Range Status   11/04/2018 4.3 3.5 - 5.0 mmol/L Final   12/19/2016 4.4 3.5 - 5.0 mmol/L Final   12/16/2016 4.0 3.5 - 5.0 mmol/L Final     Calcium   Date Value Ref Range Status   11/04/2018 9.2 8.5 - 10.5 mg/dL Final   12/19/2016 9.4 8.5 - 10.5 mg/dL Final   12/16/2016 8.9 8.5 - 10.5 mg/dL Final

## 2021-06-21 NOTE — PROGRESS NOTES
Assessment/Plan:        Diagnoses and all orders for this visit:    Calculus of ureter  -     Stone Formation, 24 Hour Urine x1; Future; Expected date: 11/28/2018  -     Patient Stated Goal: Prevent further stones  -     24 Hour Urine Collection Steps Education    Calculus of kidney  -     Urinalysis Macroscopic  -     Stone Analysis; Future  -     Stone Analysis    Other orders  -     alendronate (FOSAMAX) 70 MG tablet;   -     FLUoxetine (PROZAC) 20 MG capsule;   -     omeprazole (PRILOSEC) 20 MG capsule; Take 20 mg by mouth daily before breakfast.      Stone Management Plan  [facility] Stone Management 1/16/2017 11/7/2018 11/14/2018   Urinary Tract Infection No suspicion of infection No suspicion of infection No suspicion of infection   Renal Colic Asymptomatic at this time Well controlled symptoms Asymptomatic at this time   Renal Failure No suspicion of renal failure No suspicion of renal failure No suspicion of renal failure   Current CT date - 11/4/2018 -   Right sided stones? - No -   R Stone Event No current event No current event -   Left sided stones? - Yes -   L Number of ureteral stones - 1 -   L GSD of ureteral stones - 7 -   L Location of ureteral stone - Distal -   L Number of kidney stones  - No renal stones -   L GSD of kidney stones - N/A -   L Hydronephrosis - Moderate -   L Stone Event No current event New event Resolved event   Diagnosis date - 11/4/2018 -   L MET Status - - Passed   L Current Plan - MET -         Subjective:      HPI  Ms. Neil Orta is a 73 y.o. [place] female returning to the [facility] [place] for medical expulsive therapy follow up.     On last encounter, her 7 mm stone was in left distal ureter with moderate hydronephrosis. She has had no unanticipated events.    She passed her stone 3 days ago and has been feeling well in the interval. She has brought the specimen in and on inspection it is ~ 7-8 mm yellow stone. She still had xray obtained. She is asymptomatic at  present. She denies symptoms of fever, chills, flank pain, nausea, vomiting, urinary frequency and dysuria.     KUB from today is personally reviewed and demonstrates no distal ureteral calcification.     PLAN    70 yo [place] F with passage of large left distal ureteral stone, specimen retrieved. Aggressive stone recurrence with previous stone risk workup notable for low urine volume and mild hypocitraturia.    We will send stone for analysis.    She is congratulated on passing her stone and will proceed with updated 24 hour urine collection x 1, at earliest covenience.    Patient also seen and examined by Jessica Roa PA-C       ROS   Review of systems is negative except for HPI.    Past Medical History:   Diagnosis Date     Kidney stones     Required intervention       Past Surgical History:   Procedure Laterality Date     [place] ERCP W/BIOPSY SINGLE/MULTIPLE         Current Outpatient Medications   Medication Sig Dispense Refill     acetaminophen (TYLENOL) 500 MG tablet Take 2 tablets (1,000 mg total) by mouth 4 (four) times a day for 7 days. 56 tablet 0     dimenhyDRINATE (DRAMAMINE) 50 MG tablet Take 1 tablet (50 mg total) by mouth at bedtime for 7 days. 7 tablet 0     ibuprofen (ADVIL,MOTRIN) 200 MG tablet Take 2 tablets (400 mg total) by mouth 4 (four) times a day for 7 days. 56 tablet 0     No current facility-administered medications for this visit.        No Known Allergies    Social History     Socioeconomic History     Marital status: Single     Spouse name: Not on file     Number of children: Not on file     Years of education: Not on file     Highest education level: Not on file   Social Needs     Financial resource strain: Not on file     Food insecurity - worry: Not on file     Food insecurity - inability: Not on file     Transportation needs - medical: Not on file     Transportation needs - non-medical: Not on file   Occupational History     Not on file   Tobacco Use     Smoking status: Never Smoker    Substance and Sexual Activity     Alcohol use: No     Drug use: Not on file     Sexual activity: Not on file   Other Topics Concern     Not on file   Social History Narrative     Not on file       Family History   Problem Relation Age of Onset     Kidney disease Neg Hx      Objective:      Physical Exam  There were no vitals filed for this visit.  General - well developed, well nourished, appropriate for age. Appears no distress at this time  Abdomen - slender soft, non-tender, no hepatosplenomegaly, no masses.   - no flank tenderness, no suprapubic tenderness, kidney and bladder non-palpable  MSK - normal spinal curvature. no spinal tenderness. normal gait. muscular strength intact.  Psych - oriented to time, place, and person, normal mood and affect.      Labs   Urinalysis POC (Office):  Blood UA   Date Value Ref Range Status   01/16/2017 Moderate Negative Final   12/19/2016 Small Negative Final     Nitrite, UA   Date Value Ref Range Status   11/14/2018 Negative Negative Final   11/07/2018 Negative Negative Final   11/04/2018 Negative Negative Final     Leukocytes, UA    Date Value Ref Range Status   01/16/2017 Small (!) Negative Final   12/19/2016 Trace (!) Negative Final     pH UA   Date Value Ref Range Status   01/16/2017 5.5 4.5 - 8.0 Final   12/19/2016 5.0 4.5 - 8.0 Final       Lab Urinalysis:  Blood, UA   Date Value Ref Range Status   11/14/2018 Small (!) Negative Final   11/07/2018 Moderate (!) Negative Final   11/04/2018 Large (!) Negative Final     Nitrite, UA   Date Value Ref Range Status   11/14/2018 Negative Negative Final   11/07/2018 Negative Negative Final   11/04/2018 Negative Negative Final     Leukocytes, UA   Date Value Ref Range Status   11/14/2018 Negative Negative Final   11/07/2018 Trace (!) Negative Final   11/04/2018 Small (!) Negative Final     pH, UA   Date Value Ref Range Status   11/14/2018 5.0 5.0 - 8.0 Final   11/07/2018 5.5 5.0 - 8.0 Final   11/04/2018 6.5 4.5 - 8.0 Final     I,  Kevin Guerrero, personally saw and examined the patient, reviewed most recent labs and imaging and agree with the above assessment

## 2021-06-24 NOTE — TELEPHONE ENCOUNTER
Left message for patient's son Isaías to call back and schedule stone prevention appointment at [facility].

## 2021-06-24 NOTE — PATIENT INSTRUCTIONS - HE
Patient Stated Goal: Prevent further stones  INCREASING FLUIDS TO DECREASE RISK    Low Urinary Volume:     Kidney stones form because there is not enough water to dissolve the concentrated chemicals and minerals in urine.     Studies have found that people who make kidney stones should drink enough fluids so that they produce at least 2 liters (2 quarts) of urine per day.     Studies have shown that virtually every fluid you might drink decreases the risk of stone formation. Acceptable fluids include milk, coffee, diet and regular sodas, alcoholic beverages, fruit juices and even plain old water.    Increasing fluid intake will increase urine volume and decrease the chance of kidney stone formation.    Fluids:    Anything liquid that fits in a glass counts.     One way to gauge if your body has enough fluids is to check the color of your urine. If the urine is dark and yellow you do not have enough fluids. Urine will be pale yellow to clear if your body has enough fluids.    What we need to survive:    Most fluid we drink is lost through evaporation, more if active in hot humid environments    Body wastes can be cleared in a small amount of urine    All fluid that is consumed in excess of necessary losses  dilutes the urine    Ways to Increase Fluids Daily:    Drink more fluids throughout the day and into the evening. (You may need to awake from sleep to urinate which is a good indication of volume status)    Keep your refrigerator stocked with beverages you like    Drink a variety of fluids - mix it up    Add another beverage to your regular beverage at every meal    Keep a beverage at your desk (work area) and finish it before you leave for breaks, meetings, lunch and end of day    Use larger volume glasses    Whenever you pass a water fountain - stop and drink    Drink a beverage with snacks, if it is a salty snack, double the beverage    Take medication with a full glass of water/fluid    Keep a bottle of  water in your car and drink every 20 miles    Eat high water content fruits (melons, oranges, grapes, etc.)    Flavor water with a squeeze of lemon or lime or Crystal Light     If you do something repetitive throughout the day, link it with having something to drink    When you give your child/children something to drink, you have something to drink also    The [facility] [place] can respond to your questions or concerns 24 hours a day at [phone].      Potassium Citrate    This medication can be used in two ways to help stone formers.      Citrate in the urine makes it harder for stones to form and grow. Many stone patients do not have enough citrate in their urine and require a supplement.        Citrate is an effective way to make the urine less acidic. Uric acid stones only form when the urine is acidic and may dissolve if urine acidity can be reversed.      USES: This medication is used for low urinary citrate (hypocitraturia), renal tubular acidosis, uric acid stones, calcium oxalate stones, and cysteine stones.    HOW TO USE:     Potassium citrate comes in several different forms      Tablet - made of potassium citrate in a wax substance to allow delayed absorption. It is common to see a  ghost tablet  in the stool after all the potassium citrate has been absorbed. This is more common in people with diarrhea or other bowel disorders.      Powder - this can be dissolved in water or other clear fluids. To decrease stomach irritation and improve absorption, we recommend dissolving in at least 500 cc of fluid and drinking slowly. You do not have to drink this medication all at once. Many patients find the taste is improved by adding a sweetener.      Liquid - Similar to the powder, the liquid formulation is best dissolved in a large container of fluid and consumed slowly.     SIDE EFFECTS:  The primary side effect of this medication is stomach irritation, less so with the tablet. Some people find stomach  irritation decreased by taking the medication with food and avoiding lying down for at least a half hour after consumption.    If you develop diarrhea, nausea, vomiting, stomach pain, fluid retention, convulsions, unusual weakness, mental confusion, tingling or numbness of the hands or feet or other potential side effects, notify your doctor or pharmacist promptly.                    *This is a summary and does not contain all possible information about this product. For complete information about this product or your specific health needs, ask your healthcare professional. Always seek the advice of your healthcare professional if you have any questions about this product or your medical condition.

## 2021-06-24 NOTE — PROGRESS NOTES
Assessment/Plan:        Diagnoses and all orders for this visit:    Low urine output  -     Stone Formation, 24 Hour Urine x1; Future; Expected date: 06/04/2019  -     Patient Increasing Fluids Education    History of kidney stones  -     Urinalysis Macroscopic    Uric acid nephrolithiasis  -     Stone Formation, 24 Hour Urine x1; Future; Expected date: 06/04/2019  -     Patient Stated Goal: Prevent further stones  -     Potassium Citrate Education  -     potassium citrate (UROCIT-K) 10 mEq (1,080 mg) SR tablet; Take 1 tablet (10 mEq total) by mouth 2 (two) times a day with meals.  Dispense: 180 tablet; Refill: 3    Other orders  -     Discontinue: cetirizine (ZYRTEC) 10 MG tablet; TAKE 1 PILL BY MOUTH EVERY DAY FOR ALLERGIES  TXHUA HNUB NOJ 1 LUB TSHUAJ PAB VIET ALLERGIES; Refill: 3  -     acetaminophen (TYLENOL) 500 MG tablet; TAKE 1 PILL  500 MG  BY MOUTH EVERY 4 HOURS AS NEEDED FOR PAIN  YOG MOB TXHUA 4 TEEV NOJ 1 LUB TSHUAJ; Refill: 11  -     Discontinue: buPROPion (WELLBUTRIN XL) 300 MG 24 hr tablet      Stone Management Plan  [facility] 11/7/2018 11/14/2018 3/6/2019   Urinary Tract Infection No suspicion of infection No suspicion of infection No suspicion of infection   Renal Colic Well controlled symptoms Asymptomatic at this time Asymptomatic at this time   Renal Failure No suspicion of renal failure No suspicion of renal failure No suspicion of renal failure   Current CT date 11/4/2018 - -   Right sided stones? No - -   R Stone Event No current event - No current event   Left sided stones? Yes - -   L Number of ureteral stones 1 - -   L GSD of ureteral stones 7 - -   L Location of ureteral stone Distal - -   L Number of kidney stones  No renal stones - -   L GSD of kidney stones N/A - -   L Hydronephrosis Moderate - -   L Stone Event New event Resolved event No current event   Diagnosis date 11/4/2018 - -   L MET Status - Passed -   L Current Plan MET - -         Subjective:      HPI  Ms. Neil Orta  is a 73 y.o. Hmong female returning to the [facility] [place] for review of initial stone risk evaluation.     She is a rapidly recurrent calcium oxalate and uric acid stone former who has not required stone clearance procedures. She has previously participated in stone risk evaluation with identified risk factors of low urine volume and hypocitraturia. She has identified modifiable stone risks including:  low urine volume and acidic urine. She has no identified non-modifiable stone risk factors.     She is asymptomatic at present. She denies symptoms of fever, chills, flank pain, nausea, vomiting, urinary frequency and dysuria.     Initial collected 24 hour from 11/28/18 was undercollected with inaccurate results, prompting us to request new collection. One 24 hour urine collection from 1/14/19 demonstrates persistent severe low urine volume (1000 mL), creatinine (932 mg), borderline hypocitraturia (402 mg), calcium (219 mg), sodium (120 mmol), oxalate (22 mg), uric acid (184 mg), and acidic urine (5.5). Dietary journal is not available for review.    PLAN    74 yo Hmong F with passage of large left distal ureteral stone, 100 % uric acid. Aggressive stone recurrence with persistent risk factor of severely low urine volume, borderline hypocitraturia, and acidic urine.    Based on review of findings with the patient, she will repeat 24 hour urine collection 3 months after dietary modification and medical intervention. She will initiate increased fluid consumption to generate at least 2,000 ml urine daily and initiate potassium citrate therapy at  10 mEq  twice daily.    Patient also seen and examined by LEATHA Cao   Review of systems is negative except for HPI.    Past Medical History:   Diagnosis Date     Kidney stones     Required intervention       Past Surgical History:   Procedure Laterality Date     RI ERCP W/BIOPSY SINGLE/MULTIPLE         Current Outpatient Medications    Medication Sig Dispense Refill     acetaminophen (TYLENOL) 500 MG tablet TAKE 1 PILL  500 MG  BY MOUTH EVERY 4 HOURS AS NEEDED FOR PAIN  YOG MOB TXHUA 4 TEEV NOJ 1 LUB TSHUAJ  11     alendronate (FOSAMAX) 70 MG tablet        FLUoxetine (PROZAC) 20 MG capsule        omeprazole (PRILOSEC) 20 MG capsule Take 20 mg by mouth daily before breakfast.       potassium citrate (UROCIT-K) 10 mEq (1,080 mg) SR tablet Take 1 tablet (10 mEq total) by mouth 2 (two) times a day with meals. 180 tablet 3     No current facility-administered medications for this visit.        No Known Allergies    Social History     Socioeconomic History     Marital status: Single     Spouse name: Not on file     Number of children: Not on file     Years of education: Not on file     Highest education level: Not on file   Occupational History     Not on file   Social Needs     Financial resource strain: Not on file     Food insecurity:     Worry: Not on file     Inability: Not on file     Transportation needs:     Medical: Not on file     Non-medical: Not on file   Tobacco Use     Smoking status: Never Smoker     Smokeless tobacco: Never Used   Substance and Sexual Activity     Alcohol use: No     Drug use: Not on file     Sexual activity: Not on file   Lifestyle     Physical activity:     Days per week: Not on file     Minutes per session: Not on file     Stress: Not on file   Relationships     Social connections:     Talks on phone: Not on file     Gets together: Not on file     Attends Sikhism service: Not on file     Active member of club or organization: Not on file     Attends meetings of clubs or organizations: Not on file     Relationship status: Not on file     Intimate partner violence:     Fear of current or ex partner: Not on file     Emotionally abused: Not on file     Physically abused: Not on file     Forced sexual activity: Not on file   Other Topics Concern     Not on file   Social History Narrative     Not on file       Family  History   Problem Relation Age of Onset     Kidney disease Neg Hx      Objective:      Physical Exam  Vitals:    03/06/19 1006   BP: 140/68   Pulse: 62   Temp: 97.7  F (36.5  C)     General - well developed, well nourished, appropriate for age. Appears no distress at this time  Abdomen - slender soft, non-tender, no hepatosplenomegaly, no masses.   - no flank tenderness, no suprapubic tenderness, kidney and bladder non-palpable  MSK - normal spinal curvature. no spinal tenderness. normal gait. muscular strength intact.  Psych - oriented to time, place, and person, normal mood and affect.      Labs   Urinalysis POC (Office):  Blood UA   Date Value Ref Range Status   01/16/2017 Moderate Negative Final   12/19/2016 Small Negative Final     Nitrite, UA   Date Value Ref Range Status   03/06/2019 Negative Negative Final   11/14/2018 Negative Negative Final   11/07/2018 Negative Negative Final     Leukocytes, UA    Date Value Ref Range Status   01/16/2017 Small (!) Negative Final   12/19/2016 Trace (!) Negative Final     pH UA   Date Value Ref Range Status   01/16/2017 5.5 4.5 - 8.0 Final   12/19/2016 5.0 4.5 - 8.0 Final       Lab Urinalysis:  Blood, UA   Date Value Ref Range Status   03/06/2019 Small (!) Negative Final   11/14/2018 Small (!) Negative Final   11/07/2018 Moderate (!) Negative Final     Nitrite, UA   Date Value Ref Range Status   03/06/2019 Negative Negative Final   11/14/2018 Negative Negative Final   11/07/2018 Negative Negative Final     Leukocytes, UA   Date Value Ref Range Status   03/06/2019 Negative Negative Final   11/14/2018 Negative Negative Final   11/07/2018 Trace (!) Negative Final     pH, UA   Date Value Ref Range Status   03/06/2019 5.0 5.0 - 8.0 Final   11/14/2018 5.0 5.0 - 8.0 Final   11/07/2018 5.5 5.0 - 8.0 Final    and Stone prevention labs   24 hour urine   Calcium, 24H Urine   Date Value Ref Range Status   01/14/2019 219 20 - 275 mg/24hr Final     Comment:       Hypercalciuria  >350  Values are for persons with  average daily calcium intake  (600-800 mg/day)   11/28/2018 5 (L) 20 - 275 mg/24hr Final     Comment:       Hypercalciuria >350  Values are for persons with  average daily calcium intake  (600-800 mg/day)   12/29/2016 102 20 - 275 mg/24hr Final     Comment:       Hypercalciuria >350  Values are for persons with  average daily calcium intake  (600-800 mg/day)     Sodium, 24 Hour Urine   Date Value Ref Range Status   01/14/2019 120 40 - 217 mmol/24hr Final   11/28/2018 4 (L) 40 - 217 mmol/24hr Final   12/29/2016 83 40 - 217 mmol/24hr Final     Citrate, 24 Hour Urine   Date Value Ref Range Status   01/14/2019 402 (L) 434-1,191 mg/24hr Final     Comment:       Reference Ranges are not  established for 0-19 years  or >60 years of age.   11/28/2018 27 (L) 434-1,191 mg/24hr Final     Comment:       Reference Ranges are not  established for 0-19 years  or >60 years of age.   12/29/2016 270 (L) 434 - 1,191 mg/24hr Final     Comment:       Reference Ranges are not  established for 0-19 years  or >60 years of age.     Oxalate, 24 Hour Urine   Date Value Ref Range Status   01/14/2019 22.3 7.0 - 44.0 mg/24hr Final   11/28/2018 7.1 7.0 - 44.0 mg/24hr Final   12/29/2016 13.0 7.0 - 44.0 mg/24hr Final     pH, Urine   Date Value Ref Range Status   01/14/2019 5.5 4.5 - 8.0 Final   11/28/2018 6.0 4.5 - 8.0 Final   12/29/2016 6.5 4.5 - 8.0 Final     Urine Volume   Date Value Ref Range Status   01/14/2019 1,000 mL Final   11/28/2018 150 mL Final   12/29/2016 480 mL Final     I, Kevin Guerrero, personally saw and examined the patient, reviewed most recent labs and imaging and agree with the above assessment

## 2021-08-02 ENCOUNTER — OFFICE VISIT (OUTPATIENT)
Dept: FAMILY MEDICINE | Facility: CLINIC | Age: 76
End: 2021-08-02
Payer: COMMERCIAL

## 2021-08-02 VITALS
OXYGEN SATURATION: 98 % | WEIGHT: 96 LBS | DIASTOLIC BLOOD PRESSURE: 73 MMHG | HEART RATE: 60 BPM | SYSTOLIC BLOOD PRESSURE: 134 MMHG | RESPIRATION RATE: 16 BRPM | HEIGHT: 55 IN | BODY MASS INDEX: 22.21 KG/M2 | TEMPERATURE: 97.9 F

## 2021-08-02 DIAGNOSIS — Z00.00 WELLNESS EXAMINATION: Primary | ICD-10-CM

## 2021-08-02 PROCEDURE — 99207 PR NO BILLABLE SERVICE THIS VISIT: CPT

## 2021-08-02 PROCEDURE — 99397 PER PM REEVAL EST PAT 65+ YR: CPT | Mod: GC | Performed by: STUDENT IN AN ORGANIZED HEALTH CARE EDUCATION/TRAINING PROGRAM

## 2021-08-02 ASSESSMENT — MIFFLIN-ST. JEOR: SCORE: 778.19

## 2021-08-02 NOTE — PROGRESS NOTES
Annual Wellness Visit for 65 years and older    HPI  This 75 year old female presents as an established patient  Adi Schrader who presents for an annual wellness visit.    Other issues patient wants to be addressed today:  Chief Complaint   Patient presents with     Back Pain     Back pain radiating to foot. Left greater than right.     Medication Reconciliation     needs attention       Patient Active Problem List   Diagnosis     Major depression     Esophageal reflux     Generalized osteoarthrosis, unspecified site     Hyperlipidemia     Osteoporosis     Prediabetes     Benign paroxysmal positional vertigo of left ear     Hypocitraturia     Nephrolithiasis, uric acid     Past Medical History:   Diagnosis Date     Hyperlipidemia      Kidney stone      Kidney stones     Required intervention     Osteoarthritis        Family History   Problem Relation Age of Onset     Diabetes No family hx of      Coronary Artery Disease No family hx of      Breast Cancer No family hx of      Cancer - colorectal No family hx of      Ovarian Cancer No family hx of      Prostate Cancer No family hx of      Hypertension No family hx of      Other Cancer No family hx of      Mental Illness No family hx of      Cerebrovascular Disease No family hx of      Anesthesia Reaction No family hx of      Asthma No family hx of      Osteoporosis No family hx of      Known Genetic Syndrome No family hx of      Obesity No family hx of      Unknown/Adopted No family hx of      Kidney Disease No family hx of      Problem List, Family History and past Medical History reviewed and  unchanged/updated.    Nursing Notes:   Kathy Zavaleta  8/2/2021 11:22 AM  Signed  Due to patient being non-English speaking/uses sign language, an  was used for this visit. Only for face-to-face interpretation by an external agency, date and length of interpretation can be found on the scanned worksheet.     name: Rahel Doyle  Agency: [employer]  Language:  "Isaong   Telephone number:   Type of interpretation: Face-to-face, spoken      Are you sexually active?  No   Any sexual concerns? No     FOR WOMEN  What year did you stop having periods? \"many years ago\"  Any vaginal bleeding in the last year? No  Have you ever had an abnormal Pap smear? No      Frailty Assessment    1. Weight loss (>5% in year)  No  Wt Readings from Last 5 Encounters:   08/02/21 43.5 kg (96 lb)   10/21/20 43.9 kg (96 lb 12.8 oz)   08/05/19 48.5 kg (107 lb)   06/20/19 48.1 kg (106 lb)   05/24/19 47.4 kg (104 lb 6.4 oz)       2. Exhaustion (perceived effort for a given activity)   How difficult is walking from one room to the other on the same level?not   No     3. Weakness (handgrip strength)   How difficult is lifting or carrying something as heavy as 10 pounds? not   No    4. Decreased physical activity    Compared with most (men/women) your age, would you say  that you are more active, less active, or about the same? same   No    5. Slow gait speed (timed up and go > 12 sec.)  No  FALL RISK ASSESSMENT 8/2/2021 9/22/2017   Fallen 2 or more times in the past year? No No   Any fall with injury in the past year? No No   Timed Up and Go Test/Seconds (13.5 is a fall risk; contact physician) 12 -         Frailty screen score: 0    Frail Assessment:0 Robust         EVALUATION OF COGNITIVE FUNCTION  Mood/affect:Normal  Appearance:Normal  Family member/caregiver input: not with her today    PHQ-2 Score:   PHQ-2 ( 1999 Pf[employer]) 8/2/2021 10/21/2020   Q1: Little interest or pleasure in doing things 0 0   Q2: Feeling down, depressed or hopeless 0 0   PHQ-2 Score 0 0       PHQ-9 Score:   [facility] Follow-up to PHQ 3/21/2018 7/27/2018 8/5/2019   PHQ-9 9. Suicide Ideation past 2 weeks Not at all Not at all Not at all       Mini Cog Test:    Recall result:  3 points  Clock Draw Test result: Culturally inappropriate, didn't perform.    Cognitive screen is:Negative    Other Assessments:  CV Risk based on Pooled Cohort Risk " "  The ASCVD Risk score (Rohan SERGIO Jr., et al., 2013) failed to calculate for the following reasons:    Cannot find a previous HDL lab    Cannot find a previous total cholesterol lab  -Performed lab work today    ECG (if done)not performed    Corrected Visual acuity: L 20/20   R 20/20  Hearing evaluation if done: No        Advance Directives: Discussed with patient and family as appropriate. Has patient  completed advance directives and/or a living will? yes      Immunization History   Administered Date(s) Administered     Influenza (IIV3) PF 08/30/2017     Influenza, Quad, High Dose, Pf, 65yr + 10/21/2020     Pneumo Conj 13-V (2010&after) 07/27/2018     Pneumococcal 23 valent 08/05/2019     TDAP Vaccine (Boostrix) 07/27/2018     Reviewed Immunization Record Today  Physical Exam    Vitals: /73 (BP Location: Right arm, Patient Position: Sitting, Cuff Size: Adult Regular)   Pulse 60   Temp 97.9  F (36.6  C) (Oral)   Resp 16   Ht 1.39 m (4' 6.72\")   Wt 43.5 kg (96 lb)   SpO2 98%   BMI 22.54 kg/m    BMI= Body mass index is 22.54 kg/m .  EXAM:  Constitutional: healthy, alert and no distress   Cardiovascular: negative, PMI normal. No lifts, heaves, or thrills. RRR. No murmurs, clicks gallops or rub  Respiratory: Percussion normal. Good diaphragmatic excursion. Lungs clear  Abdomen: Abdomen soft, non-tender. BS normal. No masses, organomegaly  NEURO: Gait normal. Reflexes normal and symmetric. Sensation grossly WNL.  SKIN: no suspicious lesions or rashes  JOINT/EXTREMITIES: extremities normal- no gross deformities noted, gait normal and normal muscle tone    Assessment and Plan:      Reviewed Preventive Services and Plan form with patient as specified in  Patient Instructions.  Positive findings on assessment: *See was seen today for back pain and medication reconciliation.    Diagnoses and all orders for this visit:    Wellness examination  -     Hemoglobin A1c; Future  -     Lipid Profile; Future      Chronic " Back Pain:  -will follow-up for this in 1 week.    Options for treatment and follow-up care were reviewed with the See You  and/or guardian engaged in the decision making process and verbalized  understanding of the options discussed and agreed with the final plan.  Louie Cai MD

## 2021-08-02 NOTE — PROGRESS NOTES
Medicare Wellness Visit  Health Risk Assessment           Health Risk Assessment / Review of Systems     Constitutional: Any fevers or night sweats? No     Eyes:  Vision problems   No     Hearing Do you feel you have hearing loss?  No     Cardiovascular: Any chest pain, fast or irregular heart beat, calf pain with walking?     No           Respiratory:   Any breathing problems or cough?   No     Gastrointestinal: Any stomach or stool problems?   No      Genitourinary: Do you have difficulty controlling urination?   No     Muscles and Joints: Any joint stiffness or soreness?   No     Skin: Any concerning lesions or moles?   No     Nervous System: Any loss of strength or feeling, numbness or tingling, shaking, dizziness, or headache?  No     Mental Health: Any depression, anxiety or problems sleeping?    No     Cognition: Do you have any problems with your memory?  No            Medical Care     What other specialists or organizations are involved in your medical care?  NONE  Patient Care Team       Relationship Specialty Notifications Start End    Adi Schrader MD PCP - General Student in organized health care education/training program  7/30/21     Phone: [phone] Fax: [phone] [address] [facility] [address]    Preethi Monterroso    8/19/14     [facility] Managed Care Coordinator [phone]    Donna Douglas MD Assigned PCP   6/6/21     Phone: [phone] Fax: [phone]         [facility] [address]          Have you been to the ER or overnight in the hospital in the last year?  No          Social History / Home Safety       Marital Status:  Who lives in your household? self    Do you feel threatened or controlled by a partner, ex-partner or anyone in your life? No     Has anyone hurt you physically, for example by pushing, hitting, slapping or kicking you   or forcing you to have sex? No          Does your home have any of the following safety  concerns; loose rugs in the hallway,  bathrooms with no grab bars by the tub or toilet, stairs with no handrails or poorly lit areas?  No     Do you need help with dressing yourself, bathing, eating or getting around your home?  No     Do you need help with the phone, transportation, shopping, preparing meals, housework, laundry, medications or managing money? YES       Risk Behaviors and Healthy Habits     History   Smoking Status     Never Smoker   Smokeless Tobacco     Never Used     How many servings of fruits and vegetables do you eat a day? On average 2 servings a day. Reviewed daily recommendation and encouraged when able to increase intake.    Exercise: goes to adult day center 5 times a week. At center they do stretches and follow exercise videos x 1 hour and 30 mins.     Do you frequently drive without a seatbelt? No     Do you use tobacco?  No     Do you use any other drugs? No         Do you use alcohol?No      Frailty Assessment            Have you lost 10 or more pounds unintentionally in the previous year? No     How difficult is walking from one room to the other on the same level?not       Is it difficult to lift or carry something as heavy as 10 pounds?moderately      Compared with most (men/women) your age, would you say  that you are more active, less active, or about the same? same        FALL RISK ASSESSMENT 8/2/2021 9/22/2017   Fallen 2 or more times in the past year? No No   Any fall with injury in the past year? No No   Timed Up and Go Test/Seconds (13.5 is a fall risk; contact physician) 12 -         Advance Directives:   Discussed with patient and family as appropriate. Has patient  completed advance directives and/or a living will? Yes, has copy of completed advance directive at home per pt. Recommend when remember or at upcoming appointments to bring a copy to be scanned into her chart.      Silvana Orta RN

## 2021-08-02 NOTE — NURSING NOTE
Due to patient being non-English speaking/uses sign language, an  was used for this visit. Only for face-to-face interpretation by an external agency, date and length of interpretation can be found on the scanned worksheet.     name: Rahel Doyle  Agency: [employer]  Language: Jagruti   Telephone number:   Type of interpretation: Face-to-face, spoken

## 2021-08-02 NOTE — PATIENT INSTRUCTIONS
PERSONAL PREVENTIVE SERVICES PLAN - SERVICES     Review these tests with your medical staff then decide which ones you want and take this page home for your reference      SCREENING TESTS     Description   Year of Last Screening   Recommended Today?   Heart disease screening blood tests    Cholesterol level Reducing cholesterol can reduce your risk of heart attacks by 25%.  Screening is recommended yearly if you are at risk of heart disease otherwise every 4-5 years 6/29/2017 Yes; Recommended.   Diabetes screening tests    Hemoglobin A1c blood test   Finding and treating diabetes early can reduce complications.  Screening recommended/covered yearly if you have high blood pressure, high cholesterol, obesity (BMI >30), or a history of high blood glucose tests; or 2 of the following: family history of diabetes, overweight (BMI >25 but <30), age 65 years or older, and a history of diabetes of pregnancy or gave birth to baby weighing more than 9 lbs. 8/23/2018  hgbA1c  5.9 Yes; Recommended.   Hepatitis B screening Finding hepatitis B early can reduce complications.  Screening is recommended for persons with selected risk factors.  No: is not indicated today.   Hepatitis C screening Finding hepatitis C early can reduce complications.  Screening is recommended for all persons born from 1945 through 1965 and for those with selected other risk factors.  2/16/2018  negative No: is not indicated today.   HIV screening Finding HIV early can reduce complications.  Screening is recommended for persons with risk factors for HIV infection.  No: is not indicated today.   Glaucoma screening Early detection of glaucoma can prevent blindness.   Please talk to your eye doctor about this.       SCREENING TESTS     Description   Year of Last Screening   Recommended Today?   Colorectal cancer screening    Fecal occult blood test     Screening colonoscopy Screening for colon cancer has been shown to reduce death from colon cancer by  25-30%. Screening recommended to start at 50 years and continuing until age 75 years.    Yes; Recommended.-DECLINED   Breast Cancer Screening (women)    Mammogram Mammogram screening for breast cancer has been shown to reduce the risk of dying from breast cancer and prolong life. Screening is recommended every 1-2 years for women aged 50 to 74 years.  3/2/2018  negative Yes; Recommended .DECLINED d/t covid   Cervical Cancer screening (women)    Pap Cervical pap smears can reduce cervical cancer. Screening is recommended annually if high risk (history of abnormal pap smears) otherwise every 2-3 years, stop screening at 65 years of age if history of normal paps. 6/20/2019  normal No: is not indicated today.   Screening for Osteoporosis:  Bone mass measurements (women)    Dexa Scan Screening and treating Osteoporosis can reduce the risk of hip and spine fractures. Screening is recommended in women 65 years or older and in women and men at risk of osteoporosis. 1/30/2019  osteoporosis No: is not indicated today.   Screening for Lung Cancer     Low-dose CT scanning Screening can reduce mortality in persons aged 55-80 who have smoked at least 30 pack-years and who are either still smoking or have quit in the past 15 years.  No: is not indicated today.   Abdominal Aortic Aneurysm (AAA) screening    Ultrasound (US)   An aneurysm treated before rupture is very safe -a ruptured aneurysm can be fatal.  Screening  by US for AAA is limited to patients who meet one of the following criteria:    Men who are 65-75 years old and have smoked more than 100 cigarettes in their lifetime    Anyone with a family history of abdominal aortic aneurysm  No: is not indicated today.     Here are your recommended immunizations.  Take this home for your reference.                                                    IMMUNIZATIONS Description Recommend today?     Influenza (Flu shot) Prevents flu; should get every year No: is not indicated today.    PCV 13 Pneumonia vaccination; you get it once No; is up to date.   PPSV 23 Second pneumonia vaccination; usually get it 1 year after PCV 13 No; is up to date.   Zoster (Shingles) Prevents shingles; you get it once  (Check with Part D insurance for coverage, must receive at a pharmacy, not clinic) Yes; Recommended    Tetanus Prevents tetanus; once every 10 years No; is up to date.     Hepatitis B  If you have any of the following risk factors you should be immunized for hepatitis B: severe kidney disease, people who live in the same house as a carrier of Hepatitis B virus, people who live in  institutions (e.g. nursing homes or group homes), homosexual men, patients with hemophilia who received Factor VIII or IX concentrates, abusers of illicit injectable drugs No: is not indicated today.      PATIENT INSTRUCTIONS    Yearly exam:     See your health care provider every year in order to review changes in your health, review medicines that you take, and discuss preventive care needs such as immunizations and cancer screening.    Get a flu shot each year.     Advance Directives:    If you have not done so, you are encouraged to complete advance directives and/or a living will.   More information about advance directives can be found at: http://www.[place]med.org/advocacy/Key-Issues/Advance-Directives    Nutrition:     Eat at least 5 servings of fruits and vegetables each day.     Eat whole-grain bread, whole-wheat pasta and brown rice instead of white grains and rice.     Talk to your doctor about Calcium and Vitamin D.     Lifestyle:    Exercise for at least 150 minutes a week (30 minutes a day, 5 days a week). This will help you control your weight and prevent disease.     Limit alcohol to one drink per day.     If you smoke, try to quit - your doctor will be happy to help.     Wear sunscreen to prevent skin cancer.     See your dentist every six months for an exam and cleaning.     See your eye doctor every 1 to 2 years  to screen for conditions such as glaucoma, macular degeneration and cataracts.

## 2021-08-06 NOTE — PROGRESS NOTES
Preceptor Attestation:  Patient's case reviewed and discussed with the resident, Louie Cai MD, and I personally evaluated the patient. I agree with written assessment and plan of care.    Supervising Physician:  Rayna Hodge MD   [facility]

## 2021-08-07 DIAGNOSIS — E44.1 MILD MALNUTRITION (H): ICD-10-CM

## 2021-08-09 RX ORDER — LACTOSE-REDUCED FOOD
LIQUID (ML) ORAL
Qty: 15168 ML | Refills: 0 | Status: SHIPPED | OUTPATIENT
Start: 2021-08-09 | End: 2021-10-21

## 2021-09-07 ENCOUNTER — OFFICE VISIT (OUTPATIENT)
Dept: FAMILY MEDICINE | Facility: CLINIC | Age: 76
End: 2021-09-07
Payer: COMMERCIAL

## 2021-09-07 VITALS
SYSTOLIC BLOOD PRESSURE: 134 MMHG | HEART RATE: 52 BPM | TEMPERATURE: 98 F | RESPIRATION RATE: 16 BRPM | HEIGHT: 55 IN | DIASTOLIC BLOOD PRESSURE: 69 MMHG | OXYGEN SATURATION: 99 % | BODY MASS INDEX: 21.76 KG/M2 | WEIGHT: 94 LBS

## 2021-09-07 DIAGNOSIS — M54.50 BILATERAL LOW BACK PAIN, UNSPECIFIED CHRONICITY, UNSPECIFIED WHETHER SCIATICA PRESENT: Primary | ICD-10-CM

## 2021-09-07 PROCEDURE — 99213 OFFICE O/P EST LOW 20 MIN: CPT | Mod: GC | Performed by: STUDENT IN AN ORGANIZED HEALTH CARE EDUCATION/TRAINING PROGRAM

## 2021-09-07 ASSESSMENT — MIFFLIN-ST. JEOR: SCORE: 762.88

## 2021-09-07 NOTE — PROGRESS NOTES
"Assessment and Plan     (M54.5) Bilateral low back pain, unspecified chronicity, unspecified whether sciatica present  (primary encounter diagnosis)  Comment: Likely osteoarthritis vs herniated disc vs less likely muscle strain. Been going on a few months. Some endorsement of bilateral LE pain and mild numbness over right quad but no red flag symptoms otherwise. Physical exam unremarkable. Tried advil once but unwilling to try again because \"it caused me more pain,\" but is open to trying gel form. Has not tried physical therapy.  Plan:  -diclofenac (VOLTAREN) 1 % topical gel  -Physical Therapy Referral  -Discussed stretching maneuvers and using hot/cold packs       Options for treatment and follow-up care were reviewed with the patient and/or guardian. See You and/or guardian engaged in the decision making process and verbalized understanding of the options discussed and agreed with the final plan.    Adi Schrader MD      Precepted today with: Nancy Cook MD           HPI:       See You is a 75 year old  Female with hx of osteoporosis, OA, and back pain who presents for the following:    HPI:   Back pain:     Location: Lower bilateral back    Duration: Constant throughout the day     Provacating/palliative: Better when laying down; worsens with standing and when she's walking around    Radiation: Into the thighs but not down to the feet     Numbness/ Tingling? Numbness in the quads, mainly in the right    Weakness? Yes feels like she's weak in her legs    Flexion or Extension bias: Hurts more when extends     Red flags? None    Meds tried? Advil but she doesn't like it. Says pain is worse in her back when she takes it    PT? No    Imaging? No      [place] [facility] [place] used for this visit         PMHX:     Patient Active Problem List   Diagnosis     Major depression     Esophageal reflux     Generalized osteoarthrosis, unspecified site     Hyperlipidemia     Osteoporosis     Prediabetes     Benign paroxysmal " "positional vertigo of left ear     Hypocitraturia     Nephrolithiasis, uric acid       Current Outpatient Medications   Medication Sig Dispense Refill     acetaminophen (TYLENOL) 500 MG tablet Take 1 tablet (500 mg) by mouth every 4 hours as needed for pain 90 tablet 3     alendronate (FOSAMAX) 70 MG tablet Take 1 tablet (70 mg) by mouth every 7 days Take 60 minutes before am meal with 8 oz. water. Remain upright for 30 minutes. 25 tablet 0     bisacodyl (DULCOLAX) 5 MG EC tablet Take 1 tablet (5 mg) by mouth daily as needed for constipation (Patient not taking: Reported on 10/21/2020) 5 tablet 0     Incontinence Supply Disposable (DEPEND ADJUSTABLE UNDERWEAR) MISC 1 Application 2 times daily as needed ([place]rinay incontinence) 180 each 3     Nutritional Supplements (ENSURE HIGH PROTEIN) Take 1 Can by mouth 2 times daily 60 Can 11     Nutritional Supplements (ENSURE) LIQD DRINK CONTENT OF 1 BOTTLE BY MOUTH TWICE A DAY 74760 mL 0     omeprazole (PRILOSEC) 20 MG DR capsule Take 1 capsule (20 mg) by mouth daily 90 capsule 3       Social History     Tobacco Use     Smoking status: Never Smoker     Smokeless tobacco: Never Used   Substance Use Topics     Alcohol use: No     Drug use: No          Allergies   Allergen Reactions     Nka [No Known Allergies]        No results found for this or any previous visit (from the past 24 hour(s)).         Review of Systems:     10 point ROS negative except for what is noted in HPI          Physical Exam:     Vitals:    09/07/21 0944   BP: 134/69   Pulse: 52   Resp: 16   Temp: 98  F (36.7  C)   TempSrc: Oral   SpO2: 99%   Weight: 42.6 kg (94 lb)   Height: 1.38 m (4' 6.33\")     Body mass index is 22.39 kg/m .    General: Awake, alert and oriented. No acute distress. Sitting up in chair seemingly slightly uncomfortable.  HEENT: Conjunctivae are clear.  Respiratory: No respiratory distress.   Cardiac: Radial pulses 2+ bilaterally.  Abdominal: Abdomen is soft and non-tender without " distention.  Extremities: Upper and lower extremities grossly normal.  Neurological: Motor function is normal throughout all extremities.  Psychiatric: Good insight.    Physical Exam for back:    ROM: flexion-- full, extension-- full, lateral rotation-- full, side bend-- full    Bony/vertebral tenderness: None     Paraspinal tenderness: Yes lower spine at crests posterior iliac spine.    Strength: 5/5 w/ dorsiflexion/ plantarflexion/ knee flexion/ extension    Neuro: DTR's 2+. Babinski downgoing. Negative Clonus. Strength grossly symmetric and intact in bilateral LEs. Endorses some loss of sensation over right quad.

## 2021-10-08 ENCOUNTER — TELEPHONE (OUTPATIENT)
Dept: FAMILY MEDICINE | Facility: CLINIC | Age: 76
End: 2021-10-08

## 2021-10-08 ENCOUNTER — OFFICE VISIT (OUTPATIENT)
Dept: FAMILY MEDICINE | Facility: CLINIC | Age: 76
End: 2021-10-08
Payer: COMMERCIAL

## 2021-10-08 VITALS
HEIGHT: 55 IN | DIASTOLIC BLOOD PRESSURE: 72 MMHG | WEIGHT: 95.75 LBS | SYSTOLIC BLOOD PRESSURE: 137 MMHG | HEART RATE: 80 BPM | BODY MASS INDEX: 22.16 KG/M2 | OXYGEN SATURATION: 97 % | TEMPERATURE: 97.6 F

## 2021-10-08 DIAGNOSIS — M54.50 BILATERAL LOW BACK PAIN, UNSPECIFIED CHRONICITY, UNSPECIFIED WHETHER SCIATICA PRESENT: Primary | ICD-10-CM

## 2021-10-08 PROCEDURE — 99214 OFFICE O/P EST MOD 30 MIN: CPT | Mod: GC | Performed by: STUDENT IN AN ORGANIZED HEALTH CARE EDUCATION/TRAINING PROGRAM

## 2021-10-08 RX ORDER — GABAPENTIN 100 MG/1
100 CAPSULE ORAL 3 TIMES DAILY
Qty: 180 CAPSULE | Refills: 3 | Status: SHIPPED | OUTPATIENT
Start: 2021-10-08 | End: 2021-10-21

## 2021-10-08 ASSESSMENT — MIFFLIN-ST. JEOR: SCORE: 770.19

## 2021-10-08 NOTE — PROGRESS NOTES
Preceptor Attestation:   Patient seen, evaluated and discussed with the resident Dr. Bolivar. I have verified the content of the note, which accurately reflects my assessment of the patient and the plan of care.    Ongoing back pain, has not yet done physical therapy. Discussed importance and difference from day program exercises. Trial gabapentin at low dose with possible titration if needed. Will also obtain imaging given duration.       Supervising Physician:Benjamin Rosenstein, MD, MA  [facility]

## 2021-10-08 NOTE — PROGRESS NOTES
CHIEF COMPLAINT                                                      Chief Complaint   Patient presents with     Musculoskeletal Problem     bilateral leg paion, knee, hips and back pain onset 3 months cream that was perscibed before is not helping      Medication Reconciliation     SUBJECTIVE:                                                    See You is a 75 year old year old female who presents to clinic today for the following health issues:    Concern - Low back pain that radiates down both legs  Has had ~3 months of near constant low back pain that radiates down both legs   Was seen on 9/7 for this pain  Has associated tingling in the thighs, but foes not radiate down into the feet  Hurts more when she extends the legs than when she flexes, there is also some worse pain when she sits on hard surfaces  Has tried Advil but it does not help  Was started on Voltaren gel last time as well as recommended to follow up with physical therapy  Patient states the Voltaren has not helped, and she does not understand how PT is different than doing some exercises at her senior  center  Denies any incontinence of the bowel or bladder, weakness, change in gait, or other significant change  Quite stressed with pain, tearful given disocmfort    Patient is an established patient of this clinic.    A [facility]  was used for this visit  ----------------------------------------------------------------------------------------------------------------------  Patient Active Problem List   Diagnosis     Major depression     Esophageal reflux     Generalized osteoarthrosis, unspecified site     Hyperlipidemia     Osteoporosis     Prediabetes     Benign paroxysmal positional vertigo of left ear     Hypocitraturia     Nephrolithiasis, uric acid     Past Surgical History:   Procedure Laterality Date     CHOLECYSTECTOMY       HI ERCP W/BIOPSY SINGLE/MULTIPLE         Social History     Tobacco Use     Smoking status: Never  Smoker     Smokeless tobacco: Never Used   Substance Use Topics     Alcohol use: No     Family History   Problem Relation Age of Onset     Diabetes No family hx of      Coronary Artery Disease No family hx of      Breast Cancer No family hx of      Cancer - colorectal No family hx of      Ovarian Cancer No family hx of      Prostate Cancer No family hx of      Hypertension No family hx of      Other Cancer No family hx of      Mental Illness No family hx of      Cerebrovascular Disease No family hx of      Anesthesia Reaction No family hx of      Asthma No family hx of      Osteoporosis No family hx of      Known Genetic Syndrome No family hx of      Obesity No family hx of      Unknown/Adopted No family hx of      Kidney Disease No family hx of          Problem list and past medical, surgical, social, and family histories reviewed & adjusted, as indicated.    Current Outpatient Medications   Medication Sig Dispense Refill     diclofenac (VOLTAREN) 1 % topical gel Apply 2 g topically 4 times daily as needed for moderate pain 150 g 1     acetaminophen (TYLENOL) 500 MG tablet Take 1 tablet (500 mg) by mouth every 4 hours as needed for pain 90 tablet 3     alendronate (FOSAMAX) 70 MG tablet Take 1 tablet (70 mg) by mouth every 7 days Take 60 minutes before am meal with 8 oz. water. Remain upright for 30 minutes. 25 tablet 0     Incontinence Supply Disposable (DEPEND ADJUSTABLE UNDERWEAR) MISC 1 Application 2 times daily as needed (Urinay incontinence) 180 each 3     Nutritional Supplements (ENSURE HIGH PROTEIN) Take 1 Can by mouth 2 times daily 60 Can 11     Nutritional Supplements (ENSURE) LIQD DRINK CONTENT OF 1 BOTTLE BY MOUTH TWICE A DAY 91004 mL 0     omeprazole (PRILOSEC) 20 MG DR capsule Take 1 capsule (20 mg) by mouth daily 90 capsule 3     Medication list reviewed and updated as indicated.    Allergies   Allergen Reactions     Nka [No Known Allergies]      Allergies reviewed and updated as  "indicated.  ----------------------------------------------------------------------------------------------------------------------  ROS:  Constitutional, HEENT, cardiovascular, pulmonary, GI, musculoskeletal, neuro, skin, and psych systems are negative, except as otherwise noted.    OBJECTIVE:     /72   Pulse 80   Temp 97.6  F (36.4  C) (Oral)   Ht 1.379 m (4' 6.29\")   Wt 43.4 kg (95 lb 12 oz)   SpO2 97%   BMI 22.84 kg/m    Body mass index is 22.84 kg/m .  Exam:  Constitutional: Elderly appearing female, intermittently tearful, does not appear to be acutely uncomfortable  Head: Normocephalic. No masses, lesions, tenderness or abnormalities  Neck: Neck supple. No adenopathy. Thyroid symmetric, normal size,  Cardiovascular: negative, PMI normal. No lifts, heaves, or thrills. RRR. No murmurs, clicks gallops or rub  Respiratory: negative, Percussion normal. Good diaphragmatic excursion. Lungs clear  Gastrointestinal: Abdomen soft, non-tender. BS normal. No masses, organomegaly  Musculoskeletal: No tenderness to palpation over the spine, hip joints, knees, or ankles.  Some pain in the bilateral paraspinal muscles in the lower lumbar/sacral area.  No obvious deformity in gait, but does appear uncomfortable when she gets up to stand.  Skin: no suspicious lesions or rashes  Neurologic: Gait normal. Reflexes normal and symmetric. Hip flexion/extension, flexion/extension at the knee and extension and flexion at the ankle have normal strength.  Sensation grossly WNL.  Psychiatric: mentation appears normal and affect normal/bright  Hematologic/Lymphatic/Immunologic: Normal cervical lymph nodes    Diagnostic Test Results:  none     ASSESSMENT/PLAN:     (M54.50) Bilateral low back pain, unspecified chronicity, unspecified whether sciatica present  (primary encounter diagnosis)  -Patient continues to struggle with low back pain  -At this time will try some gabapentin 100 mg TID to see if this helps with her pain  -Also " encouraged getting to PT and this is different than normal exercise  -Patient did express some difficulty getting to PT given family is working during the day, asked clinic care coordinator to help set this up, which was performed  -Plan to follow up in two weeks to reevaluate her pain and see if there need to be any acute changes in care  -Provided with appropriate return precautions     Medications Discontinued During This Encounter   Medication Reason     bisacodyl (DULCOLAX) 5 MG EC tablet Medication Reconciliation Clean Up       Options for treatment and follow-up care were reviewed with the patient and/or guardian. See You and/or guardian engaged in the decision making process and verbalized understanding of the options discussed and agreed with the final plan    Precepted with Dr. Rosenstein.    Derrek Bolivar MD on 10/8/2021 at 10:32 AM

## 2021-10-08 NOTE — TELEPHONE ENCOUNTER
The pt needed a ride for her CT, and physical therapy appointment. The pt CT appointment is on 10/12/2021 at 12:40pm, and her physical therapy on 11/08/2021 at 11:30am. I called and setup a ride for the pt, pt will be riding with [phone]   Yes

## 2021-10-12 ENCOUNTER — HOSPITAL ENCOUNTER (OUTPATIENT)
Dept: CT IMAGING | Facility: HOSPITAL | Age: 76
Discharge: HOME | End: 2021-10-12
Attending: FAMILY MEDICINE | Admitting: FAMILY MEDICINE
Payer: COMMERCIAL

## 2021-10-12 DIAGNOSIS — M54.50 BILATERAL LOW BACK PAIN, UNSPECIFIED CHRONICITY, UNSPECIFIED WHETHER SCIATICA PRESENT: ICD-10-CM

## 2021-10-12 PROCEDURE — 72131 CT LUMBAR SPINE W/O DYE: CPT

## 2021-10-21 ENCOUNTER — OFFICE VISIT (OUTPATIENT)
Dept: FAMILY MEDICINE | Facility: CLINIC | Age: 76
End: 2021-10-21
Payer: COMMERCIAL

## 2021-10-21 VITALS
SYSTOLIC BLOOD PRESSURE: 121 MMHG | HEIGHT: 55 IN | OXYGEN SATURATION: 100 % | BODY MASS INDEX: 21.94 KG/M2 | DIASTOLIC BLOOD PRESSURE: 69 MMHG | TEMPERATURE: 97.9 F | RESPIRATION RATE: 16 BRPM | HEART RATE: 61 BPM | WEIGHT: 94.8 LBS

## 2021-10-21 DIAGNOSIS — E44.1 MILD MALNUTRITION (H): ICD-10-CM

## 2021-10-21 DIAGNOSIS — Z79.1 PATIENT TAKES NSAID (NON-STEROID ANTI-INFLAMMATORY DRUG): ICD-10-CM

## 2021-10-21 DIAGNOSIS — M54.50 BILATERAL LOW BACK PAIN, UNSPECIFIED CHRONICITY, UNSPECIFIED WHETHER SCIATICA PRESENT: Primary | ICD-10-CM

## 2021-10-21 LAB
ANION GAP SERPL CALCULATED.3IONS-SCNC: 12 MMOL/L (ref 5–18)
BUN SERPL-MCNC: 20 MG/DL (ref 8–28)
CALCIUM SERPL-MCNC: 9.5 MG/DL (ref 8.5–10.5)
CHLORIDE BLD-SCNC: 110 MMOL/L (ref 98–107)
CO2 SERPL-SCNC: 20 MMOL/L (ref 22–31)
CREAT SERPL-MCNC: 0.75 MG/DL (ref 0.6–1.1)
GFR SERPL CREATININE-BSD FRML MDRD: 79 ML/MIN/1.73M2
GLUCOSE BLD-MCNC: 204 MG/DL (ref 70–125)
POTASSIUM BLD-SCNC: 3.5 MMOL/L (ref 3.5–5)
SODIUM SERPL-SCNC: 142 MMOL/L (ref 136–145)

## 2021-10-21 PROCEDURE — 99213 OFFICE O/P EST LOW 20 MIN: CPT | Mod: 25 | Performed by: STUDENT IN AN ORGANIZED HEALTH CARE EDUCATION/TRAINING PROGRAM

## 2021-10-21 PROCEDURE — 36415 COLL VENOUS BLD VENIPUNCTURE: CPT | Performed by: STUDENT IN AN ORGANIZED HEALTH CARE EDUCATION/TRAINING PROGRAM

## 2021-10-21 PROCEDURE — 80048 BASIC METABOLIC PNL TOTAL CA: CPT | Performed by: STUDENT IN AN ORGANIZED HEALTH CARE EDUCATION/TRAINING PROGRAM

## 2021-10-21 RX ORDER — MELOXICAM 7.5 MG/1
7.5 TABLET ORAL DAILY
Qty: 90 TABLET | Refills: 1 | Status: SHIPPED | OUTPATIENT
Start: 2021-10-21 | End: 2022-04-28

## 2021-10-21 RX ORDER — LACTOSE-REDUCED FOOD
LIQUID (ML) ORAL
Qty: 15168 ML | Refills: 3 | Status: SHIPPED | OUTPATIENT
Start: 2021-10-21 | End: 2022-06-01

## 2021-10-21 ASSESSMENT — MIFFLIN-ST. JEOR: SCORE: 761.51

## 2021-10-21 NOTE — LETTER
October 22, 2021      See You  [address]  [place] [address]        Dear ,    We are writing to inform you of your test results.  Your blood work shows your glucose is elevated more than normal. This likely indicates more poorly controlled diabetes. Thankfully we have a visit scheduled soon again so we can talk more then.      Resulted Orders   Basic metabolic panel   Result Value Ref Range    Sodium 142 136 - 145 mmol/L    Potassium 3.5 3.5 - 5.0 mmol/L    Chloride 110 (H) 98 - 107 mmol/L    Carbon Dioxide (CO2) 20 (L) 22 - 31 mmol/L    Anion Gap 12 5 - 18 mmol/L    Urea Nitrogen 20 8 - 28 mg/dL    Creatinine 0.75 0.60 - 1.10 mg/dL    Calcium 9.5 8.5 - 10.5 mg/dL    Glucose 204 (H) 70 - 125 mg/dL    GFR Estimate 79 >60 mL/min/1.73m2      Comment:      As of July 11, 2021, eGFR is calculated by the CKD-EPI creatinine equation, without race adjustment. eGFR can be influenced by muscle mass, exercise, and diet. The reported eGFR is an estimation only and is only applicable if the renal function is stable.       If you have any questions or concerns, please call the clinic at the number listed above.       Sincerely,      Adi Schrader MD

## 2021-10-21 NOTE — PROGRESS NOTES
Preceptor Attestation:   Patient seen, evaluated and discussed with the resident. I have verified the content of the note, which accurately reflects my assessment of the patient and the plan of care.    Supervising Physician:Jenelle Florez MD    [facility]

## 2021-10-21 NOTE — PROGRESS NOTES
Assessment and Plan   (M54.50) Bilateral low back pain, unspecified chronicity, unspecified whether sciatica present  (primary encounter diagnosis)  (Z791.1) NSAID use  Comment: Persistent back pain without red flag sx, however is slightly improved from last visit. Was started on gabapentin a couple weeks ago but stopped taking 2/2 side affects and didn't think it helped; not on any other meds but has tried capsaicin creams which help. Has not seen PT due to COVID-19 fears; however now more willing after discussion. CT imaging from 10/2021 shows transitional L5 partially sacralized and severe lumbar stenosis. Is not desiring of surgery at this juncture and does not want surgical consult yet.  Plan:   - Discontinue Gabapentin given not taking 2/2 side affects  - Mobic 7.5mg daily with food  - BMP today for Cr  - PT referral  - Education on conservative measures, ice/hot packs, use of salonpas lidocaine patches, etc.  - Follow-up in 5 weeks for reassessment    Options for treatment and follow-up care were reviewed with the patient and/or guardian. See You and/or guardian engaged in the decision making process and verbalized understanding of the options discussed and agreed with the final plan.    Edison Staples, MS3    I was present with the medical student who participated in the service and in the documentation of this note. I have verified the history and personally performed the physical exam and medical decision making, and have verified the content of the note, which accurately reflects my assessment of the patient and the plan of care.     Adi Schrader MD    Precepted today with: Dr Florez       HPI:   Neil Orta is a 76 year old  female with pertinent hx of with a significant past medical history of MDD, BPPV, osteoporosis who presents for the following:    Last visit on 10/8  Concern - Low back pain that radiates down both legs    Has had ~3-4 months of  near constant low back pain that radiates down both  legs     Was seen on 9/7 for this pain and again 10/8    Has associated tingling in the thighs, but foes not radiate down into the feet    Hurts more when she extends the legs than when she flexes, there is also some worse pain when she sits on hard surfaces    Has tried Advil but it does not help    Was started on Voltaren gel last time as well as recommended to follow up with physical therapy    Patient states the Voltaren has not helped, and she does not understand how PT is different than doing some exercises at her senior  center    Denies any incontinence of the bowel or bladder, weakness, change in gait, or other significant change    Quite stressed with pain, tearful given discomfort    HPI:   Back pain: Throbbing, crawling, and tingling.    Location: lower back pain and to the knees bilaterally.      Duration: Every two or three hours, but mostly right before bed. Achey throughout.      Radiation:  Tingling starts at the toes and comes back to the knee.     Numbness/ Tingling? Yes    Weakness? Yes, difficulty with walking. Is able to sit up without pain.     Flexion or Extension bias:     Red flags? No    Meds tried? Gabapentin 3x day --> drowsiness.     PT? No has never been. Concern for the virus.     Imaging? Noted below.      A [employer]  was used for this visit       PMHX:     Patient Active Problem List   Diagnosis     Major depression     Esophageal reflux     Generalized osteoarthrosis, unspecified site     Hyperlipidemia     Osteoporosis     Prediabetes     Benign paroxysmal positional vertigo of left ear     Hypocitraturia     Nephrolithiasis, uric acid     Current Outpatient Medications   Medication Sig Dispense Refill     acetaminophen (TYLENOL) 500 MG tablet Take 1 tablet (500 mg) by mouth every 4 hours as needed for pain 90 tablet 3     alendronate (FOSAMAX) 70 MG tablet Take 1 tablet (70 mg) by mouth every 7 days Take 60 minutes before am meal with 8 oz. water. Remain upright  for 30 minutes. 25 tablet 0     diclofenac (VOLTAREN) 1 % topical gel Apply 2 g topically 4 times daily as needed for moderate pain 150 g 1     gabapentin (NEURONTIN) 100 MG capsule Take 1 capsule (100 mg) by mouth 3 times daily 180 capsule 3     Incontinence Supply Disposable (DEPEND ADJUSTABLE UNDERWEAR) MISC 1 Application 2 times daily as needed (Urinay incontinence) 180 each 3     Nutritional Supplements (ENSURE HIGH PROTEIN) Take 1 Can by mouth 2 times daily 60 Can 11     Nutritional Supplements (ENSURE) LIQD DRINK CONTENT OF 1 BOTTLE BY MOUTH TWICE A DAY 30991 mL 0     omeprazole (PRILOSEC) 20 MG DR capsule Take 1 capsule (20 mg) by mouth daily 90 capsule 3     Social History     Tobacco Use     Smoking status: Never Smoker     Smokeless tobacco: Never Used   Substance Use Topics     Alcohol use: No     Drug use: No      Allergies   Allergen Reactions     Nka [No Known Allergies]      No results found for this or any previous visit (from the past 24 hour(s)).     CT spine imaging from 10/12/21:  IMPRESSION:  1.  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 Please correlate with radiographs prior to any surgical or interventional procedures.  2.  Severe spinal canal narrowing at L4-L5.  3.  Mild to moderate spinal canal narrowing and moderate narrowing of the right lateral recess at L2-L3.  4.  Mild spinal canal narrowing and mild narrowing of the right lateral recess at L1-L2.  5.  Mild spinal canal narrowing and mild narrowing of the lateral recesses at L3-L4.  6.  Severe right neuroforaminal narrowing at L2-L3.  7.  Severe left and moderate to severe right neuroforaminal narrowing at L4-L5.  8.  Moderate to severe right and mild to moderate left neuroforaminal narrowing at L1-L2.  9.  Moderate left and mild to moderate right neuroforaminal narrowing at L3-L4.  10.  Approximately 35 degrees of levocurvature from T12 to L3.          Review of Systems:     10 point ROS negative except for what is  "noted in HPI          Physical Exam:     Vitals:    10/21/21 1022   BP: 121/69   Pulse: 61   Resp: 16   Temp: 97.9  F (36.6  C)   TempSrc: Oral   SpO2: 100%   Weight: 43 kg (94 lb 12.8 oz)   Height: 1.38 m (4' 6.33\")     Body mass index is 22.58 kg/m .    General: Sitting comfortably. No acute distress.   Respiratory: No respiratory distress.  Extremities: Upper and lower extremities grossly normal. No gross deformities of lower extremities.  Neurological: Motor function is grossly normal. Gait normal.  Psychiatric: Good insight, normal affect.    Physical Exam for back:    ROM: flexion-- full, extension-- full, lateral rotation-- full, side bend-- full    Bony/vertebral tenderness: None     Paraspinal tenderness: lower lumbar back    Sensation: intact in b/l lower extremities     Strength: 5/5 strength in LEs    Maneuvers: Negative straight leg raise b/l. Negative RANDY     Neuro: Achilles reflex normal 2+. Babinski downgoing. Strength and sensation grossly symmetric and intact    "

## 2021-10-21 NOTE — NURSING NOTE
Due to patient being non-English speaking/uses sign language, an  was used for this visit. Only for face-to-face interpretation by an external agency, date and length of interpretation can be found on the scanned worksheet.     name: Nona   Agency: [employer]  Language: Jagruti   Telephone number: [phone]  Type of interpretation: Face-to-face, spoken

## 2021-10-21 NOTE — PATIENT INSTRUCTIONS
-Try a lidocaine patch that you can get over the counter (for instance- Salonpas)  -Start mobic daily, take with food  -Start physical therapy  -Return in 5 weeks for reassessment

## 2021-11-08 ENCOUNTER — HOSPITAL ENCOUNTER (OUTPATIENT)
Dept: PHYSICAL THERAPY | Facility: REHABILITATION | Age: 76
End: 2021-11-08
Attending: FAMILY MEDICINE
Payer: COMMERCIAL

## 2021-11-08 DIAGNOSIS — M54.50 BILATERAL LOW BACK PAIN, UNSPECIFIED CHRONICITY, UNSPECIFIED WHETHER SCIATICA PRESENT: ICD-10-CM

## 2021-11-08 DIAGNOSIS — M62.81 GENERALIZED MUSCLE WEAKNESS: Primary | ICD-10-CM

## 2021-11-08 PROCEDURE — 97110 THERAPEUTIC EXERCISES: CPT | Mod: GP

## 2021-11-08 PROCEDURE — 97161 PT EVAL LOW COMPLEX 20 MIN: CPT | Mod: GP

## 2021-11-08 NOTE — PROGRESS NOTES
[facility]          OUTPATIENT PHYSICAL THERAPY ORTHOPEDIC EVALUATION  PLAN OF TREATMENT FOR OUTPATIENT REHABILITATION  (COMPLETE FOR INITIAL CLAIMS ONLY)  Patient's Last Name, First Name, M.I.  YOB: 1945  You,See       Provider s Name:  [facility]   Medical Record No.  [id number]   Start of Care Date:  11/08/21   Onset Date:      Type:     _X__PT   ___OT   ___SLP Medical Diagnosis:  M54.50 (ICD-10-CM) - Bilateral low back pain, unspecified chronicity, unspecified whether sciatica present     PT Diagnosis:  Radiating low back pain, R>L; generalized muscle weakness; decreased lumbar ROM    Visits from SOC:  1      _________________________________________________________________________________  Plan of Treatment/Functional Goals:  ADL retraining,balance training,gait training,joint mobilization,manual therapy,neuromuscular re-education,ROM,strengthening,stretching     Biofeedback,Cryotherapy,Electrical stimulation,Hot packs,TENS,Traction,Ultrasound     Goals  Goal Identifier: HEP  Goal Description: Patient will demonstrate independence with home exercises to facilitate improvement in pain and function.   Target Date: 11/29/21    Goal Identifier: Pain  Goal Description: Patient will report no greater than 3/10 pain to improve quality of life.   Target Date: 12/27/21    Goal Identifier: Walking   Goal Description: Patient will tolerate walking for at least 30 minutes to improve ability to exercise and socialize.   Target Date: 12/27/21    Goal Identifier: Sleeping  Goal Description: Patient will be able to sleep through the night to improve quality of life.   Target Date: 12/27/21              Therapy Frequency:  1 time/week  Predicted Duration of Therapy Intervention:  10 weeks     Scott Parmer, PT, DPT                 I CERTIFY THE NEED FOR THESE SERVICES FURNISHED UNDER        THIS PLAN OF TREATMENT AND WHILE UNDER MY CARE     (Physician  co-signature of this document indicates review and certification of the therapy plan).                       Certification Date From:  11/08/21   Certification Date To:  01/10/22    Referring Provider:  Adi Schrader MD     Initial Assessment        See Epic Evaluation Start of Care Date: 11/08/21 11/08/21 1100   General Information   Type of Visit Initial OP Ortho PT Evaluation   Start of Care Date 11/08/21   Referring Physician Adi Schrader MD    Orders Evaluate and Treat   Date of Order 10/21/21   Certification Required? Yes   Medical Diagnosis M54.50 (ICD-10-CM) - Bilateral low back pain, unspecified chronicity, unspecified whether sciatica present   Surgical/Medical history reviewed Yes   Precautions/Limitations no known precautions/limitations       Present Yes   Language Hmong   Body Part(s)   Body Part(s) Hip;Lumbar Spine/SI   Presentation and Etiology   Pertinent history of current problem (include personal factors and/or comorbidities that impact the POC) Patient has been experiencing some radiating lower back pain. Patient gets radiating pain into the knees and feet. Patient has been having this pain mostly in the R leg, but also seems to altenate between the L and R sides. Patient gets a lot of pain in the mornings and then at the end of the day. Patient will do some exercises when she goes to the senior center. Patient would like to be able to for walks throughout the day, but has not been able to due to pain. Patient reports pain started earlier around July. Patient also reports difficulty sleeping due to pain.    Impairments A. Pain;D. Decreased ROM;E. Decreased flexibility;G. Impaired balance;H. Impaired gait;K. Numbness;L. Tingling   Functional Limitations perform activities of daily living   How/Where did it occur From insidious onset   Chronicity Chronic   Pain rating (0-10 point scale) Best (/10);Worst (/10)   Best (/10) 2   Worst (/10) 10   Pain quality E.  Shooting;C. Aching;B. Dull;D. Burning   Frequency of pain/symptoms B. Intermittent   Pain/symptoms are: Worse in the morning   Pain/symptoms exacerbated by B. Walking;C. Lifting;D. Carrying;G. Certain positions;K. Home tasks;L. Work tasks   Pain/symptoms eased by C. Rest;I. OTC medication(s);A. Sitting;E. Changing positions;F. Certain positions   Progression of symptoms since onset: Worsened   Current / Previous Interventions   Diagnostic Tests: CT scan   CT Results Results   CT results 1.  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 Please correlate with radiographs prior to any surgical or interventional procedures.1.  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 Please correlate with radiographs prior to any surgical or interventional procedures. 2.  Severe spinal canal narrowing at L4-L5. 3.  Mild to moderate spinal canal narrowing and moderate narrowing of the right lateral recess at L2-L3. 4.  Mild spinal canal narrowing and mild narrowing of the right lateral recess at L1-L2.5.  Mild spinal canal narrowing and mild narrowing of the lateral recesses at L3-L4.   Fall Risk Screen   Fall screen completed by PT   Have you fallen 2 or more times in the past year? No   Have you fallen and had an injury in the past year? No   Is patient a fall risk? No   Abuse Screen (yes response referral indicated)   Feels Unsafe at Home or Work/School no   Feels Threatened by Someone no   Does Anyone Try to Keep You From Having Contact with Others or Doing Things Outside Your Home? no   Physical Signs of Abuse Present no   System Outcome Measures   Outcome Measures Low Back Pain (see Oswestry and Shadi)  (72%)   Hip Objective Findings   Femoral Nerve Stretch Test (+) R    Lumbar Spine/SI Objective Findings   Observation Antalgic movements, patient is tearful due to pain   Posture Unable to stand up straight due to pain; anterior pelvic tilt, R trunk lean to off-load L side    Gait/Locomotion L  trendelenburg lean, slow, antalgic    Balance/Proprioception (Single Leg Stance) Unable R due to pain; L: 7 seconds, pain    Flexion ROM Fingertips to top of shoes, slight pain   Extension ROM Mod loss, no pain   Right Side Bending ROM Fingertips to mid thigh with L sided back pain   Left Side Bending ROM Fingertips past knee joint line    Repeated Extension-Standing ROM Standing extension relieved patient's pain    Lumbar ROM Comment Overall guarded due to pain    Pelvic Screen ASIS assessed in supine, appear level    Hip Screen Hip PROM assessed in supine: WNL B, slight pain with end range hip flexion on R    Hip Flexion (L2) Strength 4/5 R; 5/5 L    Hip Abduction Strength 3/5 R; 4/5 L    Hip Extension Strength 3/5 R; 3/5 L    Knee Flexion Strength 4/5 B    Knee Extension (L3) Strength 5/5 B   Ankle Dorsiflexion (L4) Strength 5/5 B   Great Toe Extension (L5) Strength 5/5 B   Ankle Plantar Flexion (S1) Strength 5/5 B    Hamstring Flexibility 90/90: 0 degrees L; -10 degrees R    Quadricep Flexibility Ely's: (+) on R    Obers Flexibility (-) for ITB tightness B    Piriformis Flexibility Increased tightness on R    SLR (-) B    Crossover SLR (-) B    Slump Test (+) R   Segmental Mobility Hypomobility noted with PAIVMs throughout lumbar spine   Sensation Testing Intact through dermatomal pattern B    Palpation Tenderness noted to the B lumbar paraspinals, glutes, R>L    Planned Therapy Interventions   Planned Therapy Interventions ADL retraining;balance training;gait training;joint mobilization;manual therapy;neuromuscular re-education;ROM;strengthening;stretching   Planned Modality Interventions   Planned Modality Interventions Biofeedback;Cryotherapy;Electrical stimulation;Hot packs;TENS;Traction;Ultrasound   Clinical Impression   Criteria for Skilled Therapeutic Interventions Met yes, treatment indicated   PT Diagnosis Radiating low back pain, R>L; generalized muscle weakness; decreased lumbar ROM    Functional  limitations due to impairments Standing, walking, lifting/carrying, bending, twisting, sleeping    Clinical Presentation Stable/Uncomplicated   Clinical Decision Making (Complexity) Low complexity   Therapy Frequency 1 time/week   Predicted Duration of Therapy Intervention (days/wks) 10 weeks    Risk & Benefits of therapy have been explained Yes   Patient, Family & other staff in agreement with plan of care Yes   Clinical Impression Comments Patient is a 76 year old female presenting to physical therapy with radiating R sided low back pain that started in about July. Patient gets numbness and tingling through the L3-L4 dermatomal patterns. Patient presents with the following impairments: radiating R sided low back pain, generalized weakness, decreased lumbar/trunk ROM, and difficulty with prolonged standing and walking. Patient would benefit from skilled PT services to address noted limitations and improve function.    ORTHO GOALS   PT Ortho Eval Goals 1;2;3;4   Ortho Goal 1   Goal Identifier HEP   Goal Description Patient will demonstrate independence with home exercises to facilitate improvement in pain and function.    Target Date 11/29/21   Ortho Goal 2   Goal Identifier Pain   Goal Description Patient will report no greater than 3/10 pain to improve quality of life.    Target Date 12/27/21   Ortho Goal 3   Goal Identifier Walking    Goal Description Patient will tolerate walking for at least 30 minutes to improve ability to exercise and socialize.    Target Date 12/27/21   Ortho Goal 4   Goal Identifier Sleeping   Goal Description Patient will be able to sleep through the night to improve quality of life.    Target Date 12/27/21   Total Evaluation Time   PT Eval, Low Complexity Minutes (62732) 30   Therapy Certification   Certification date from 11/08/21   Certification date to 01/10/22   Medical Diagnosis M54.50 (ICD-10-CM) - Bilateral low back pain, unspecified chronicity, unspecified whether sciatica present      Scott Parmer, PT, DPT

## 2021-11-17 DIAGNOSIS — M81.0 AGE-RELATED OSTEOPOROSIS WITHOUT CURRENT PATHOLOGICAL FRACTURE: ICD-10-CM

## 2021-11-17 RX ORDER — ALENDRONATE SODIUM 70 MG/1
70 TABLET ORAL
Qty: 25 TABLET | Refills: 1 | Status: SHIPPED | OUTPATIENT
Start: 2021-11-17 | End: 2022-10-17

## 2021-11-28 NOTE — PROGRESS NOTES
Assessment and Plan     (M54.50) Bilateral low back pain, unspecified chronicity, unspecified whether sciatica present  (primary encounter diagnosis)  (M48.061) Spinal stenosis of lumbar region, unspecified whether neurogenic claudication present  Comment: Overall unchanged from prior visit. Still mainly in lower back and radiating down from butt to right thigh. No red flags. CT imaging from 10/2021 shows transitional L5 partially sacralized and severe lumbar stenosis. Has been taking mobic and tylenol with minimal relief. Tried gabapentin in the past and got very drowsy- doesn't want to try again and also doesn't want to trial an anti-spasmodic. Has been to PT a few times; they gave her exercises which she's been consistently doing at home with minimal relief. Does not want surgery but is open to other interventions such as possibly injections. Has not seen spine specialist yet and is more willing but is very concerned about not having a [facility] .   Plan:   -Spine Referral  -Continue PT exercises  -Continue mobic 7.5mg daily and tylenol (refilled) as needed  -Discussed palliative measures such as hot packs, salonpas patches  -Follow-up in 4-6 weeks with me for back pain    Options for treatment and follow-up care were reviewed with the patient and/or guardian. See You and/or guardian engaged in the decision making process and verbalized understanding of the options discussed and agreed with the final plan.       Joseph Smith  [facility]  Medical Student, MS3  1058, November 29, 2021     I was present with the medical student who participated in the service and in the documentation of this note. I have verified the history and personally performed the physical exam and medical decision making, and have verified the content of the note, which accurately reflects my assessment of the patient and the plan of care.     Adi Schrader MD      Precepted today with: Dr. Hodge           HPI:        See You is a 76 year old  female with pertinent hx of prediabetes and osteoporosis who presents for the following:    At last visit with me in 10/2021  Presented with persistent back pain without red flag sx; had slightly improved from prior visit. CT imaging from 10/2021 shows transitional L5 partially sacralized and severe lumbar stenosis. Is not desiring of surgery at this juncture and does not want surgical consult yet. Has been started on gabapentin a couple weeks ago but stopped taking 2/2 side affects and didn't think it helped; not on any other meds but has tried capsaicin creams which help; I started her on mobic 7.5mg daily w/ food and checked a BMP for kidney fx which was normal- plan to check BMP periodically if continuing to take mobic. Had not seen PT due to COVID-19 fears; however was more willing after discussion and I re-referred her and discussed going now. Also educated on conservative measures, ice/hot packs, use of salonpas lidocaine patches.    Today's visit  Has tingling sensations, burning, and sharp pains (described as a 'knife' pain) sensation in low back, thighs, and shins. Also endorses some weakness in L leg as well. Symptoms are unchanged since last visit to clinic, no worsening, no improvement.   - No /GI symptoms/changes   - Duration: 1 month    - Physical Therapy, has not helped. Went to PT once, she reports doing the exercises every day without improvement.  - Treatments: Nothing has helped improvement symptoms; has used some topical ointments with mild relief   - Walking/movement of lower extremities brings on back        A [employer]  was used for this visit         PMHX:     Patient Active Problem List   Diagnosis     Major depression     Esophageal reflux     Generalized osteoarthrosis, unspecified site     Hyperlipidemia     Osteoporosis     Prediabetes     Benign paroxysmal positional vertigo of left ear     Hypocitraturia     Nephrolithiasis, uric acid  "      Current Outpatient Medications   Medication Sig Dispense Refill     acetaminophen (TYLENOL) 500 MG tablet Take 1 tablet (500 mg) by mouth every 4 hours as needed for pain 90 tablet 3     meloxicam (MOBIC) 7.5 MG tablet Take 1 tablet (7.5 mg) by mouth daily 90 tablet 1     alendronate (FOSAMAX) 70 MG tablet Take 1 tablet (70 mg) by mouth every 7 days Take 60 minutes before am meal with 8 oz. water. Remain upright for 30 minutes. 25 tablet 1     Incontinence Supply Disposable (DEPEND ADJUSTABLE UNDERWEAR) MISC 1 Application 2 times daily as needed (Urinay incontinence) 180 each 3     Nutritional Supplements (ENSURE HIGH PROTEIN) Take 1 Can by mouth 2 times daily 60 Can 11     Nutritional Supplements (ENSURE) LIQD DRINK CONTENT OF 1 BOTTLE BY MOUTH TWICE A DAY 56677 mL 3     omeprazole (PRILOSEC) 20 MG DR capsule Take 1 capsule (20 mg) by mouth daily 90 capsule 3       Social History     Tobacco Use     Smoking status: Never Smoker     Smokeless tobacco: Never Used   Substance Use Topics     Alcohol use: No     Drug use: No          Allergies   Allergen Reactions     Nka [No Known Allergies]        No results found for this or any previous visit (from the past 24 hour(s)).         Review of Systems:     10 point ROS negative except for what is noted in HPI          Physical Exam:     Vitals:    11/29/21 1029   BP: 138/74   Pulse: 73   Temp: 98.2  F (36.8  C)   TempSrc: Oral   SpO2: 97%   Weight: 41.7 kg (92 lb)   Height: 1.375 m (4' 6.13\")     Body mass index is 22.07 kg/m .    General: Sitting slightly uncomfortably trying to stay off right hip more.   Respiratory: No respiratory distress.  Cardiac: Brisk cap refill. Radial pulses 2+ bilaterally.  Abdominal: Abdomen is soft and non-tender without distention.  Extremities: Upper and lower extremities grossly normal.  Skin: Skin in warm without rashes.  Neurological: Motor function and sensation grossly normal throughout.   Psychiatric: Frustrated with back pain " but consolable.

## 2021-11-29 ENCOUNTER — OFFICE VISIT (OUTPATIENT)
Dept: FAMILY MEDICINE | Facility: CLINIC | Age: 76
End: 2021-11-29
Payer: COMMERCIAL

## 2021-11-29 VITALS
SYSTOLIC BLOOD PRESSURE: 138 MMHG | HEART RATE: 73 BPM | HEIGHT: 55 IN | TEMPERATURE: 98.2 F | DIASTOLIC BLOOD PRESSURE: 74 MMHG | OXYGEN SATURATION: 97 % | WEIGHT: 92 LBS | BODY MASS INDEX: 21.29 KG/M2

## 2021-11-29 DIAGNOSIS — M48.061 SPINAL STENOSIS OF LUMBAR REGION, UNSPECIFIED WHETHER NEUROGENIC CLAUDICATION PRESENT: ICD-10-CM

## 2021-11-29 DIAGNOSIS — M54.50 BILATERAL LOW BACK PAIN, UNSPECIFIED CHRONICITY, UNSPECIFIED WHETHER SCIATICA PRESENT: Primary | ICD-10-CM

## 2021-11-29 DIAGNOSIS — M15.0 PRIMARY OSTEOARTHRITIS INVOLVING MULTIPLE JOINTS: ICD-10-CM

## 2021-11-29 PROCEDURE — 99213 OFFICE O/P EST LOW 20 MIN: CPT | Mod: GC | Performed by: STUDENT IN AN ORGANIZED HEALTH CARE EDUCATION/TRAINING PROGRAM

## 2021-11-29 RX ORDER — ACETAMINOPHEN 500 MG
500 TABLET ORAL EVERY 4 HOURS PRN
Qty: 90 TABLET | Refills: 3 | Status: SHIPPED | OUTPATIENT
Start: 2021-11-29 | End: 2023-04-18

## 2021-11-29 ASSESSMENT — MIFFLIN-ST. JEOR: SCORE: 745.69

## 2021-11-29 NOTE — Clinical Note
Hi Dr. Schrader,     Can you add in your attestation for Joseph's note and then I will sign this one. Thanks!    Rayna Hodge MD

## 2021-11-29 NOTE — PROGRESS NOTES
Preceptor Attestation:  Patient's case reviewed and discussed with the resident, Adi Schrader MD, and I personally evaluated the patient. I agree with written assessment and plan of care.    Supervising Physician:  Rayna Hodge MD   [facility]

## 2021-11-29 NOTE — NURSING NOTE
Due to patient being non-English speaking/uses sign language, an  was used for this visit. Only for face-to-face interpretation by an external agency, date and length of interpretation can be found on the scanned worksheet.     name: Fredy Crenshaw   Agency: [employer]  Language: Jagruti   Telephone number: [phone]  Type of interpretation: Telephone, spoken

## 2021-11-29 NOTE — PATIENT INSTRUCTIONS
Follow-up with spine specialist  Continue physical therapy exercises  Continue other treatment measures

## 2021-12-06 ENCOUNTER — TELEPHONE (OUTPATIENT)
Dept: FAMILY MEDICINE | Facility: CLINIC | Age: 76
End: 2021-12-06

## 2021-12-06 ENCOUNTER — OFFICE VISIT (OUTPATIENT)
Dept: PHYSICAL MEDICINE AND REHAB | Facility: CLINIC | Age: 76
End: 2021-12-06
Payer: COMMERCIAL

## 2021-12-06 VITALS — SYSTOLIC BLOOD PRESSURE: 144 MMHG | DIASTOLIC BLOOD PRESSURE: 67 MMHG | HEART RATE: 62 BPM | OXYGEN SATURATION: 98 %

## 2021-12-06 DIAGNOSIS — M51.369 DDD (DEGENERATIVE DISC DISEASE), LUMBAR: ICD-10-CM

## 2021-12-06 DIAGNOSIS — M54.16 LUMBAR RADICULOPATHY: Primary | ICD-10-CM

## 2021-12-06 DIAGNOSIS — M48.061 SPINAL STENOSIS OF LUMBAR REGION, UNSPECIFIED WHETHER NEUROGENIC CLAUDICATION PRESENT: ICD-10-CM

## 2021-12-06 DIAGNOSIS — M48.061 LUMBAR FORAMINAL STENOSIS: ICD-10-CM

## 2021-12-06 DIAGNOSIS — M48.061 SPINAL STENOSIS OF LUMBAR REGION WITHOUT NEUROGENIC CLAUDICATION: ICD-10-CM

## 2021-12-06 DIAGNOSIS — M54.50 BILATERAL LOW BACK PAIN, UNSPECIFIED CHRONICITY, UNSPECIFIED WHETHER SCIATICA PRESENT: ICD-10-CM

## 2021-12-06 PROCEDURE — 99204 OFFICE O/P NEW MOD 45 MIN: CPT | Performed by: NURSE PRACTITIONER

## 2021-12-06 ASSESSMENT — PAIN SCALES - GENERAL: PAINLEVEL: SEVERE PAIN (7)
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
## 2021-12-06 NOTE — PATIENT INSTRUCTIONS
~[facility] scheduling #[phone].  ~Please call our [facility] Nurse Navigation line [phone] with any questions or concerns about your treatment plan, if symptoms worsen and you would like to be seen urgently, or if you have problems controlling bladder and bowel function.      ~You have been referred for Physical Therapy to [facility]. They will call you to schedule an appointment.      Scheduling phone number is [phone] for [facility], or [place] location.  If you have not heard from the scheduling office within 2 business days, please call [phone] for ALL other locations.    Discussed the importance of core strengthening, ROM, stretching exercises and how each of these entities is important in decreasing pain and improving long term spine health.  The purpose of physical therapy is to teach you an individualized home exercise program.  These exercises need to be performed every day in order to decrease pain and prevent future occurrences of pain.          Imaging has been ordered. Radiology will call you to schedule. Please call below if you do not hear from them in the next couple of days.   [facility] Scheduling  Please call [phone] to schedule your image(s) (select option #1). There are 2 different locations, see below.     [facility]  [address] [address]    [facility]  [address] [address]

## 2021-12-06 NOTE — TELEPHONE ENCOUNTER
I got a message from one of the  about the pt. Pt has an appointment on 12/28/2021 at 2:00pm with  and needs a ride. I called [facility] and was able to setup for the pt to ride with [employer]. [phone].

## 2021-12-08 ENCOUNTER — APPOINTMENT (OUTPATIENT)
Dept: INTERPRETER SERVICES | Facility: CLINIC | Age: 76
End: 2021-12-08
Payer: COMMERCIAL

## 2021-12-14 ENCOUNTER — TELEPHONE (OUTPATIENT)
Dept: PHYSICAL MEDICINE AND REHAB | Facility: CLINIC | Age: 76
End: 2021-12-14
Payer: COMMERCIAL

## 2021-12-14 NOTE — TELEPHONE ENCOUNTER
Call received from , at pt's living facility. He states he discussed the pt's last visit with her and they inquire why there wasn't any medication prescribed at the appointment. Chart reviewed. Explained gabapentin was offered by pt declined at that time. He will discuss with pt and if she would like to try the medication he will call back.     He states the patient wanted to cancel her physical therapy appointment as well. Encouraged him to have patient keep these appointments if possible. He will speak with her.     MRI lumbar spine was ordered at appointment as well. No appointment set for this. He will discuss with pt and if she would like to proceed he will let us know.

## 2021-12-30 ENCOUNTER — OFFICE VISIT (OUTPATIENT)
Dept: FAMILY MEDICINE | Facility: CLINIC | Age: 76
End: 2021-12-30
Payer: COMMERCIAL

## 2021-12-30 VITALS
BODY MASS INDEX: 21.43 KG/M2 | RESPIRATION RATE: 16 BRPM | TEMPERATURE: 98.4 F | HEIGHT: 55 IN | OXYGEN SATURATION: 98 % | DIASTOLIC BLOOD PRESSURE: 64 MMHG | SYSTOLIC BLOOD PRESSURE: 133 MMHG | WEIGHT: 92.6 LBS | HEART RATE: 73 BPM

## 2021-12-30 DIAGNOSIS — Z79.1 ENCOUNTER FOR MONITORING CHRONIC NSAID THERAPY: ICD-10-CM

## 2021-12-30 DIAGNOSIS — M81.0 AGE-RELATED OSTEOPOROSIS WITHOUT CURRENT PATHOLOGICAL FRACTURE: ICD-10-CM

## 2021-12-30 DIAGNOSIS — M48.062 SPINAL STENOSIS OF LUMBAR REGION WITH NEUROGENIC CLAUDICATION: Primary | ICD-10-CM

## 2021-12-30 DIAGNOSIS — Z51.81 ENCOUNTER FOR MONITORING CHRONIC NSAID THERAPY: ICD-10-CM

## 2021-12-30 PROCEDURE — 99214 OFFICE O/P EST MOD 30 MIN: CPT | Mod: GC | Performed by: STUDENT IN AN ORGANIZED HEALTH CARE EDUCATION/TRAINING PROGRAM

## 2021-12-30 ASSESSMENT — MIFFLIN-ST. JEOR: SCORE: 751.53

## 2021-12-30 NOTE — NURSING NOTE
Due to patient being non-English speaking/uses sign language, an  was used for this visit. Only for face-to-face interpretation by an external agency, date and length of interpretation can be found on the scanned worksheet.     name: Yu  Agency: [employer]  Language: Jagr[place]   Telephone number: [phone]  Type of interpretation: Face-to-face, spoken

## 2021-12-30 NOTE — PROGRESS NOTES
Assessment and Plan     (M48.062) Spinal stenosis of lumbar region with neurogenic claudication  (primary encounter diagnosis)  Comment:  Overall still unchanged from prior visit, describing same sx of low back pain with bilateral LE radicular sx. No red flags. CT imaging from 10/2021 shows transitional L5 partially sacralized and severe lumbar stenosis. Has been taking mobic and tylenol with minimal relief. Tried gabapentin in the past and got very drowsy- doesn't want to try again and also still doesn't want to trial an anti-spasmodic. Has been to PT a few times but overall does not have a 'positive attitude' about PT. Does not want surgery but is open to other interventions such as possibly injections. Was seen by spine clinic 12/7 who ordered lumbar MRI to help determine if injection would benefit patient however patient states that she did not understand the need for the imaging and thus did not go.  Similarly spine clinic wanted her to go see PT more but she did not set that up for herself and therefore has not been yet.  After long discussion today, patient now willing to schedule lumbar MRI and to be seen by PT again.  Plan:   -Patient to call radiology to set up lumbar MRI  -Patient sitting up the appointment for physical therapy   -Continue Mobic and Tylenol  -Trial of Salonpas patches  -Provided education and set expectations regarding diagnosis  -Follow-up in 5 weeks for reassessment and continued management    (M81.0) Age-related osteoporosis without current pathological fracture  Comment: DEXA scan in 2019 showing osteoporosis.  Was started on Fosamax and has been taking weekly since 2019.  Says she does not miss doses.  Plan:   -Continue taking Fosamax weekly; would eventually consider holiday from bisphosphonate  -Discussed good diet and exercise for bone health and prevention of falls    (Z51.81,  Z79.1) Encounter for monitoring chronic NSAID therapy  Comment: Continues to take Mobic 7.5 mg daily  for low back pain.  BMP last time showed normal renal function.  Has been on omeprazole daily for GI protection.  Plan:   -Consider BMP at next visit  -Continue omeprazole daily        Options for treatment and follow-up care were reviewed with the patient and/or guardian. See You and/or guardian engaged in the decision making process and verbalized understanding of the options discussed and agreed with the final plan.    Adi Schrader MD      Precepted today with: Lynn Olivo MD           HPI:       See You is a 76 year old  female with pertinent hx of osteoporosis and lumbar stenosis with radicular pain who presents for back pain follow-up:    Last visit  (M54.50) Bilateral low back pain, unspecified chronicity, unspecified whether sciatica present  (primary encounter diagnosis)  (M48.061) Spinal stenosis of lumbar region, unspecified whether neurogenic claudication present  Comment: Overall unchanged from prior visit. Still mainly in lower back and radiating down from butt to right thigh. No red flags. CT imaging from 10/2021 shows transitional L5 partially sacralized and severe lumbar stenosis. Has been taking mobic and tylenol with minimal relief. Tried gabapentin in the past and got very drowsy- doesn't want to try again and also doesn't want to trial an anti-spasmodic. Has been to PT a few times; they gave her exercises which she's been consistently doing at home with minimal relief. Does not want surgery but is open to other interventions such as possibly injections. Has not seen spine specialist yet and is more willing but is very concerned about not having a [facility] .   Plan:   -Spine Referral  -Continue PT exercises  -Continue mobic 7.5mg daily and tylenol (refilled) as needed  -Discussed palliative measures such as hot packs, salonpas patches  -Follow-up in 4-6 weeks with me for back pain    Went to spine visit, they ordered MRI lumbar spine to further assess lumbar stenosis. Their plan involved  "further PT and tylenol and then consider L4-5 bilateral TFESI pending imaging review.    This visit  Patients states she understands the plan from the spine clinic of PT and imaging, but she doesn't want to do either of these. This has been discussed several times with regards to why she needs these.  Says the tylenol helps some. Doesn't feel like the mobic helps. Is wanting to continue her meds though.  Is willing to try salonpas.    Says she's still taking fosamax weekly. Is aware of her \"weaker\" bones she says when we discuss it further. Doesn't need a refill.    Is still taking her prilosec given she's been on the mobic daily. Says she takes this daily and has enough pills at home.      A [employer]inder  was used for this visit         PMHX:     Patient Active Problem List   Diagnosis     Major depression     Esophageal reflux     Generalized osteoarthrosis, unspecified site     Hyperlipidemia     Osteoporosis     Prediabetes     Benign paroxysmal positional vertigo of left ear     Hypocitraturia     Nephrolithiasis, uric acid       Current Outpatient Medications   Medication Sig Dispense Refill     acetaminophen (TYLENOL) 500 MG tablet Take 1 tablet (500 mg) by mouth every 4 hours as needed for pain 90 tablet 3     alendronate (FOSAMAX) 70 MG tablet Take 1 tablet (70 mg) by mouth every 7 days Take 60 minutes before am meal with 8 oz. water. Remain upright for 30 minutes. 25 tablet 1     Incontinence Supply Disposable (DEPEND ADJUSTABLE UNDERWEAR) MISC 1 Application 2 times daily as needed (Urinay incontinence) 180 each 3     meloxicam (MOBIC) 7.5 MG tablet Take 1 tablet (7.5 mg) by mouth daily 90 tablet 1     Nutritional Supplements (ENSURE HIGH PROTEIN) Take 1 Can by mouth 2 times daily 60 Can 11     Nutritional Supplements (ENSURE) LIQD DRINK CONTENT OF 1 BOTTLE BY MOUTH TWICE A DAY 27414 mL 3     omeprazole (PRILOSEC) 20 MG DR capsule Take 1 capsule (20 mg) by mouth daily 90 capsule 3       Social History " "    Tobacco Use     Smoking status: Never Smoker     Smokeless tobacco: Never Used   Substance Use Topics     Alcohol use: No     Drug use: No          Allergies   Allergen Reactions     Nka [No Known Allergies]        No results found for this or any previous visit (from the past 24 hour(s)).         Review of Systems:     10 point ROS negative except for what is noted in HPI          Physical Exam:     Vitals:    12/30/21 1306   BP: 133/64   Pulse: 73   Resp: 16   Temp: 98.4  F (36.9  C)   TempSrc: Oral   SpO2: 98%   Weight: 42 kg (92 lb 9.6 oz)   Height: 1.38 m (4' 6.33\")     Body mass index is 22.06 kg/m .    General: Sitting comfortably. No acute distress.   HEENT: Conjunctivae are clear, nonicteric.  Respiratory: No respiratory distress.   Cardiac:  Radial pulses 2+ bilaterally.  Abdominal: Abdomen is soft and non-tender without distention.  Extremities: Upper and lower extremities grossly normal. Thin.  Skin: Skin in warm without rashes.  Neurological: Motor function is grossly normal. Sensation in tact bilateral LEs. Gait hunched over but good balance.  Psychiatric: Fair insight.    "

## 2021-12-30 NOTE — PATIENT INSTRUCTIONS
Salonpas patches, over the counter.  Get imaging set up as we discussed.  Start PT again- this will help the pain and your functionality.      Schedule an appointment with [facility] scheduling, call this number to make appointment: #[phone].          ~You have been referred for Physical Therapy to [facility].    Scheduling phone number is #[phone] for [facility], or [place] location.         Imaging has been ordered. Please call this #[phone] to schedule for imaging (select option #1 when you call the number).   There are 2 different locations you can get the imaging done at, see below.      [facility]  [address] [address]     [place] [id number] [facility] [address]

## 2021-12-30 NOTE — PROGRESS NOTES
Faculty Supervision of Residents   I have examined this patient, labs, documentation and imaging. The medical care has been evaluated and discussed with the resident.  I am agreement with resident documentation which reflects our discussion and decision making.     DOS: 12/30/21    Nkechi Olivo MD

## 2022-01-18 NOTE — ADDENDUM NOTE
Encounter addended by: Parmer, Scott, PT on: 1/18/2022 1:26 PM   Actions taken: Episode resolved, Clinical Note Signed, Flowsheet accepted

## 2022-01-18 NOTE — PROGRESS NOTES
[facility]    Outpatient Physical Therapy Discharge Note  Patient: Neil Orta  : 1945    Beginning/End Dates of Reporting Period:  2021     Referring Provider: Adi Schrader MD    Therapy Diagnosis: Lumbar pain, generalized weakness     Client Self Report: see eval       Goals:  Goal Identifier HEP   Goal Description Patient will demonstrate independence with home exercises to facilitate improvement in pain and function.    Target Date 21   Date Met      Progress (detail required for progress note):       Goal Identifier Pain   Goal Description Patient will report no greater than 3/10 pain to improve quality of life.    Target Date 21   Date Met      Progress (detail required for progress note):       Goal Identifier Walking    Goal Description Patient will tolerate walking for at least 30 minutes to improve ability to exercise and socialize.    Target Date 21   Date Met      Progress (detail required for progress note):       Goal Identifier Sleeping   Goal Description Patient will be able to sleep through the night to improve quality of life.    Target Date 21   Date Met      Progress (detail required for progress note):           Plan:  Discharge from therapy.    Discharge:    Reason for Discharge: Patient has failed to schedule further appointments.      Discharge Plan: Patient to continue home program. Will need a new referral to schedule more physical therapy.     Scott Parmer, PT, DPT

## 2022-02-04 ENCOUNTER — OFFICE VISIT (OUTPATIENT)
Dept: FAMILY MEDICINE | Facility: CLINIC | Age: 77
End: 2022-02-04
Payer: COMMERCIAL

## 2022-02-04 VITALS
DIASTOLIC BLOOD PRESSURE: 83 MMHG | RESPIRATION RATE: 18 BRPM | OXYGEN SATURATION: 99 % | HEIGHT: 55 IN | HEART RATE: 64 BPM | WEIGHT: 92 LBS | TEMPERATURE: 98 F | BODY MASS INDEX: 21.29 KG/M2 | SYSTOLIC BLOOD PRESSURE: 138 MMHG

## 2022-02-04 DIAGNOSIS — M48.062 SPINAL STENOSIS OF LUMBAR REGION WITH NEUROGENIC CLAUDICATION: Primary | ICD-10-CM

## 2022-02-04 DIAGNOSIS — Z79.1 PATIENT TAKES NSAID (NON-STEROID ANTI-INFLAMMATORY DRUG): ICD-10-CM

## 2022-02-04 PROCEDURE — 99214 OFFICE O/P EST MOD 30 MIN: CPT | Mod: GC | Performed by: STUDENT IN AN ORGANIZED HEALTH CARE EDUCATION/TRAINING PROGRAM

## 2022-02-04 RX ORDER — GABAPENTIN 100 MG/1
100 CAPSULE ORAL 3 TIMES DAILY
Qty: 180 CAPSULE | Refills: 1 | Status: SHIPPED | OUTPATIENT
Start: 2022-02-04 | End: 2022-04-28

## 2022-02-04 ASSESSMENT — MIFFLIN-ST. JEOR: SCORE: 748.81

## 2022-02-04 NOTE — NURSING NOTE
Due to patient being non-English speaking/uses sign language, an  was used for this visit. Only for face-to-face interpretation by an external agency, date and length of interpretation can be found on the scanned worksheet.     name: Fredy Crenshaw  Agency: [employer]  Language: Jagruti   Telephone number: [phone]  Type of interpretation: Telephone, spoken

## 2022-02-04 NOTE — PROGRESS NOTES
I have personally reviewed the history and examination as documented by Dr. Schrader.  I was present during key portions of the visit and agree with the assessment and plan as documented for 76 yr old female with spinal stenosis here for follow-up of chronic low back pain. Pt not interested in PT, further imaging, or injections. Meds adjusted. Precautions given. Anticipatory guidance given.     Alvaro Conner MD  February 4, 2022  11:05 AM

## 2022-02-04 NOTE — PROGRESS NOTES
"Assessment and Plan     (M48.062) Spinal stenosis of lumbar region with neurogenic claudication  Comment: Continues to be essentially unchanged from prior and describes same sx of low back pain with bilateral LE radicular sx. No red flags. CT imaging from 10/2021 shows transitional L5 partially sacralized and severe lumbar stenosis. Has been taking mobic and tylenol with minimal relief. Tried gabapentin in the past and got very drowsy- discussed today trying again and is resistant but will try again regardless. Doesn't want to trial an anti-spasmodic. Has been to PT a few times but overall does not have a 'positive attitude' about PT and also is not willing to return because \"there are too many illnesses going around\" despite my explanation that PT is going to be immensely important for her sx. Was seen by spine clinic 12/7 who ordered lumbar MRI to help determine if injection would benefit patient however she is unwilling to get this and doesn't understand how it'll benefit despite us discussing it mutliple times.  Similarly spine clinic wanted her to go see PT more. I had long discussion with patient today about how we want to help her but that she needs to start taking our recommendations seriously as we can't help her if she doesn't follow through with the plan.  Plan:   -Trial of Gabapentin 100mg TID  -Continue mobic daily  -Strongly recommended reestablishing with PT and to return to spine clinic  -Continue supportive cares such as hot/cold packs, home PT   -Return in next couple months    (Z79.1) Patient takes NSAID (non-steroid anti-inflammatory drug)  (primary encounter diagnosis)  Comment: Taking mobic for last few months for the above. Feels like it does help a little and wants to continue. Have been following her kidney function intermittently. No hx of GI bleeds.  Plan:   -Basic metabolic panel discussed this visit, patient wants to wait till at least next visit  -Order BMP at next visit  -Continue " omeprazole 20mg daily        Options for treatment and follow-up care were reviewed with the patient and/or guardian. Neil Orta and/or guardian engaged in the decision making process and verbalized understanding of the options discussed and agreed with the final plan.    Adi Schrader MD      Precepted today with: Dr Conner           HPI:       Neil Orta is a 76 year old  female with pertinent hx of osteoporosis and lumbar stenosis with radicular pain who presents for back pain follow-up:    At last visit  (M48.062) Spinal stenosis of lumbar region with neurogenic claudication  (primary encounter diagnosis)  Comment:  Overall still unchanged from prior visit, describing same sx of low back pain with bilateral LE radicular sx. No red flags. CT imaging from 10/2021 shows transitional L5 partially sacralized and severe lumbar stenosis. Has been taking mobic and tylenol with minimal relief. Tried gabapentin in the past and got very drowsy- doesn't want to try again and also still doesn't want to trial an anti-spasmodic. Has been to PT a few times but overall does not have a 'positive attitude' about PT. Does not want surgery but is open to other interventions such as possibly injections. Was seen by spine clinic 12/7 who ordered lumbar MRI to help determine if injection would benefit patient however patient states that she did not understand the need for the imaging and thus did not go.  Similarly spine clinic wanted her to go see PT more but she did not set that up for herself and therefore has not been yet.  After long discussion today, patient now willing to schedule lumbar MRI and to be seen by PT again.  Plan:   -Patient to call radiology to set up lumbar MRI  -Patient sitting up the appointment for physical therapy   -Continue Mobic and Tylenol  -Trial of Salonpas patches  -Provided education and set expectations regarding diagnosis  -Follow-up in 5 weeks for reassessment and continued management     (Z51.81,  Z79.1)  Encounter for monitoring chronic NSAID therapy  Comment: Continues to take Mobic 7.5 mg daily for low back pain.  BMP last time showed normal renal function.  Has been on omeprazole daily for GI protection.  Plan:   -Consider BMP at next visit  -Continue omeprazole daily    Today:  Right leg hurting a lot; no weakness. Says it feels like it's been getting worse. Applies a Condomaniong medicine patch to the leg and she does feel like it helps a little. Will get tingling/burning in her calf and it will go into her knee and that will hurt a lot. Has tried gabapentin briefly in the past but it made her feel groggy and so she stopped. Says she doesn't want to do physical therapy because 'there are too many illnesses going around.' She has done very limited PT prior.  Left leg doesn't have pain but does have weakness.    A "[employer]  was used for this visit         PMHX:     Patient Active Problem List   Diagnosis     Major depression     Esophageal reflux     Generalized osteoarthrosis, unspecified site     Hyperlipidemia     Osteoporosis     Prediabetes     Benign paroxysmal positional vertigo of left ear     Hypocitraturia     Nephrolithiasis, uric acid       Current Outpatient Medications   Medication Sig Dispense Refill     acetaminophen (TYLENOL) 500 MG tablet Take 1 tablet (500 mg) by mouth every 4 hours as needed for pain 90 tablet 3     gabapentin (NEURONTIN) 100 MG capsule Take 1 capsule (100 mg) by mouth 3 times daily 180 capsule 1     meloxicam (MOBIC) 7.5 MG tablet Take 1 tablet (7.5 mg) by mouth daily 90 tablet 1     alendronate (FOSAMAX) 70 MG tablet Take 1 tablet (70 mg) by mouth every 7 days Take 60 minutes before am meal with 8 oz. water. Remain upright for 30 minutes. (Patient not taking: Reported on 2/4/2022) 25 tablet 1     Incontinence Supply Disposable (DEPEND ADJUSTABLE UNDERWEAR) MISC 1 Application 2 times daily as needed (Urinay incontinence) (Patient not taking: Reported on 2/4/2022) 180 each 3  "    Nutritional Supplements (ENSURE HIGH PROTEIN) Take 1 Can by mouth 2 times daily 60 Can 11     Nutritional Supplements (ENSURE) LIQD DRINK CONTENT OF 1 BOTTLE BY MOUTH TWICE A DAY 58126 mL 3     omeprazole (PRILOSEC) 20 MG DR capsule Take 1 capsule (20 mg) by mouth daily 90 capsule 3       Social History     Tobacco Use     Smoking status: Never Smoker     Smokeless tobacco: Never Used   Substance Use Topics     Alcohol use: No     Drug use: No          Allergies   Allergen Reactions     Nka [No Known Allergies]        No results found for this or any previous visit (from the past 24 hour(s)).         Review of Systems:     10 point ROS negative except for what is noted in HPI          Physical Exam:     Vitals:    02/04/22 1023 02/04/22 1025   BP: (!) 146/65 138/83   Pulse: 64    Resp: 18    Temp: 98  F (36.7  C)    TempSrc: Oral    SpO2: 99%    Weight: 41.7 kg (92 lb)    Height: 1.38 m (4' 6.33\")      Body mass index is 21.91 kg/m .    General: Sitting comfortably. Keeps rubbing bilateral legs during visit.  HEENT: Conjunctivae are clear, nonicteric.  Respiratory: No respiratory distress.   Cardiac: Radial pulses 2+ bilaterally.  Abdominal: Abdomen is soft and non-tender without distention.  Extremities: Upper and lower extremities grossly normal without deformity.  Skin: Skin in warm without rashes.  Neurological: Walks bit hunched forward. Good muscle strength in b/l upper and lower extremities.  Psychiatric: Poor insight into management. Frustrated.      "

## 2022-04-12 ENCOUNTER — DOCUMENTATION ONLY (OUTPATIENT)
Dept: FAMILY MEDICINE | Facility: CLINIC | Age: 77
End: 2022-04-12
Payer: COMMERCIAL

## 2022-04-12 NOTE — PROGRESS NOTES
Annual assessment in person with Neil, her son Isaías and GLENNA Thomson, who is the Responsible Party. I completed the PCA assessment, as well as the waiver assessment. There was an  with using the [employer] Language Line.    Current Situation:   See is a 76 year old female who lives alone in a high rise building. Today, we did the assessment by phone,with her son and GLENNA, who is the RP for the PCA assessmentt. She lived with them a few years back and they are supportive to her needs.     Activities of daily living (ADL)/instrumental activities of daily living (IADL) and functional issues:  Client needs help with the following ADL's: dressing (making sure she is wearing appropriate clothing), grooming and bathing, though she is shy to have someone help her. Family will be there when she bathes, and stands outside the bathroom in case she needs assistance.  Client needs help with the following IADL's: shopping, cooking, housekeeping, laundry, managing finances/bills and transportation  Client states she is unable to perform the above due to pain, limited ROM in her extremities and Major Depressive Disorder     Health concerns for today  No new concerns today. She does have shoulder pain and limited ROM. She had kidney stones three years ago but have had no issues since then. She had both COVID 19 vaccinations. She had cataracts removed in October 2020. She also has bilateral knee pain.  Has patient fallen 2 or more times in the last year? No  Has patient fallen with injury in the last year? No     Cognition/mental health  She has an RP to help her make decisions. She is usually rather tearful during our visit, and she was tearful during our visit when talking about her knee pain. She has been going to a [facility] therapist monthly. She also staying busy with watching studentSNube videos of people gardening back in [facility], as well as attending her [employer] as well.     STARS/Med Adherence  Client is non-compliant with the  following quality measures: Mammogram and Colon Cancer Screening. She had a positive FITT in 2019 and she was set to have a colonoscopy in the summer of 2019, but for some reason it did not happen.  Comments: Discussed and provided education about the above preventative exams.     Client's Plan of Care consists of:  Adult day care (5 days/week), Life line, Personal care assistance (PCA) (1.75 hours per day) and Transportation, Homemaking (1hr/week), and DME (pullups, 2 per day).    Preethi Monterroso, [facility]  [phone]

## 2022-04-28 ENCOUNTER — OFFICE VISIT (OUTPATIENT)
Dept: FAMILY MEDICINE | Facility: CLINIC | Age: 77
End: 2022-04-28
Payer: COMMERCIAL

## 2022-04-28 VITALS
WEIGHT: 92 LBS | HEIGHT: 55 IN | TEMPERATURE: 98.3 F | HEART RATE: 62 BPM | RESPIRATION RATE: 18 BRPM | BODY MASS INDEX: 21.29 KG/M2 | OXYGEN SATURATION: 98 % | DIASTOLIC BLOOD PRESSURE: 74 MMHG | SYSTOLIC BLOOD PRESSURE: 125 MMHG

## 2022-04-28 DIAGNOSIS — M48.062 SPINAL STENOSIS OF LUMBAR REGION WITH NEUROGENIC CLAUDICATION: Primary | ICD-10-CM

## 2022-04-28 PROCEDURE — 99214 OFFICE O/P EST MOD 30 MIN: CPT | Mod: GC | Performed by: STUDENT IN AN ORGANIZED HEALTH CARE EDUCATION/TRAINING PROGRAM

## 2022-04-28 RX ORDER — OMEPRAZOLE 20 MG/1
20 TABLET, DELAYED RELEASE ORAL
COMMUNITY
End: 2022-10-17

## 2022-04-28 RX ORDER — DULOXETIN HYDROCHLORIDE 30 MG/1
CAPSULE, DELAYED RELEASE ORAL
Qty: 194 CAPSULE | Refills: 0 | Status: SHIPPED | OUTPATIENT
Start: 2022-04-28 | End: 2022-05-23

## 2022-04-28 NOTE — PROGRESS NOTES
Assessment and Plan     (M48.062) Spinal stenosis of lumbar region with neurogenic claudication  (primary encounter diagnosis)  Comment: Continues to be essentially unchanged from prior and describes same sx of low back pain with bilateral LE radicular sx. No red flags. CT imaging from 10/2021 shows transitional L5 partially sacralized and severe lumbar stenosis.Used to take mobic and tylenol but no relief so stopped. Tried gabapentin twice in the past and got very drowsy- does not want to be on this. Doesn't want to trial an anti-spasmodic. Been to PT once despite my explanations that PT is going to be immensely important for her sx. Was seen by spine clinic 12/7 who ordered lumbar MRI to help determine if injection would benefit patient however she is unwilling to get this and doesn't understand how it'll benefit despite us discussing it mutliple times.  Similarly spine clinic wanted her to go see PT more. Patient today still unwilling to go back to spine or PT but willing to try new medication trial and then if those don't give any benefit- she will be willing to go to spine and PT again.  Plan:   -Start duloxetine 30mg daily for 2 weeks then increase to 60mg daily  -Trial of voltaren gel per patient request  -Counseled on spine referral and PT; patient prefers to wait and decide on these until after medication trial  -Follow-up with me in 6 weeks for reassessment and refill of duloxetine if appropriate at that       Options for treatment and follow-up care were reviewed with the patient and/or guardian. See You and/or guardian engaged in the decision making process and verbalized understanding of the options discussed and agreed with the final plan.    Adi Schrader MD      Precepted today with: Dr Florez           HPI:       See You is a 76 year old  female with pertinent hx of osteoporosis and lumbar stenosis with radicular pain who presents for back/waist pain:    At last visit:  (M48.062) Spinal stenosis  "of lumbar region with neurogenic claudication  Comment: Continues to be essentially unchanged from prior and describes same sx of low back pain with bilateral LE radicular sx. No red flags. CT imaging from 10/2021 shows transitional L5 partially sacralized and severe lumbar stenosis. Has been taking mobic and tylenol with minimal relief. Tried gabapentin in the past and got very drowsy- discussed today trying again and is resistant but will try again regardless. Doesn't want to trial an anti-spasmodic. Has been to PT a few times but overall does not have a 'positive attitude' about PT and also is not willing to return because \"there are too many illnesses going around\" despite my explanation that PT is going to be immensely important for her sx. Was seen by spine clinic 12/7 who ordered lumbar MRI to help determine if injection would benefit patient however she is unwilling to get this and doesn't understand how it'll benefit despite us discussing it mutliple times.  Similarly spine clinic wanted her to go see PT more. I had long discussion with patient today about how we want to help her but that she needs to start taking our recommendations seriously as we can't help her if she doesn't follow through with the plan.  Plan:   -Trial of Gabapentin 100mg TID  -Continue mobic daily  -Strongly recommended reestablishing with PT and to return to spine clinic  -Continue supportive cares such as hot/cold packs, home PT   -Return in next couple months     (Z79.1) Patient takes NSAID (non-steroid anti-inflammatory drug)  (primary encounter diagnosis)  Comment: Taking mobic for last few months for the above. Feels like it does help a little and wants to continue. Have been following her kidney function intermittently. No hx of GI bleeds.  Plan:   -Basic metabolic panel discussed this visit, patient wants to wait till at least next visit  -Order BMP at next visit  -Continue omeprazole 20mg daily    Today's visit:  Continues to " "endorse chronic back pain same as before.   Tried gabapentin again but says it doesn't help the pain and it just makes her drowsy.  Not taking mobic anymore, says it doesn't help and so just quit.  Is taking OTC ong medications- unclear if actually helping or not.  Is still very resistant to physical therapy \"because of covid\" despite long discussions about how she really needs PT.      [facility]  was used for this visit         PMHX:     Patient Active Problem List   Diagnosis     Major depression     Esophageal reflux     Generalized osteoarthrosis, unspecified site     Hyperlipidemia     Osteoporosis     Prediabetes     Benign paroxysmal positional vertigo of left ear     Hypocitraturia     Nephrolithiasis, uric acid       Current Outpatient Medications   Medication Sig Dispense Refill     diclofenac (VOLTAREN) 1 % topical gel Apply 2 g topically 4 times daily as needed for moderate pain 100 g 1     DULoxetine (CYMBALTA) 30 MG capsule Take 1 capsule (30 mg) by mouth daily for 14 days, THEN 2 capsules (60 mg) daily. 194 capsule 0     acetaminophen (TYLENOL) 500 MG tablet Take 1 tablet (500 mg) by mouth every 4 hours as needed for pain 90 tablet 3     alendronate (FOSAMAX) 70 MG tablet Take 1 tablet (70 mg) by mouth every 7 days Take 60 minutes before am meal with 8 oz. water. Remain upright for 30 minutes. (Patient not taking: Reported on 2/4/2022) 25 tablet 1     Incontinence Supply Disposable (DEPEND ADJUSTABLE UNDERWEAR) MISC 1 Application 2 times daily as needed (Urinay incontinence) (Patient not taking: Reported on 2/4/2022) 180 each 3     Nutritional Supplements (ENSURE HIGH PROTEIN) Take 1 Can by mouth 2 times daily 60 Can 11     Nutritional Supplements (ENSURE) LIQD DRINK CONTENT OF 1 BOTTLE BY MOUTH TWICE A DAY 10699 mL 3     omeprazole (PRILOSEC OTC) 20 MG EC tablet Take 20 mg by mouth       omeprazole (PRILOSEC) 20 MG DR capsule Take 1 capsule (20 mg) by mouth daily 90 capsule 3       Social " "History     Tobacco Use     Smoking status: Never Smoker     Smokeless tobacco: Never Used   Substance Use Topics     Alcohol use: No     Drug use: No          Allergies   Allergen Reactions     Nka [No Known Allergies]        No results found for this or any previous visit (from the past 24 hour(s)).         Review of Systems:     10 point ROS negative except for what is noted in HPI          Physical Exam:     Vitals:    04/28/22 1028 04/28/22 1029   BP: (!) 163/91 125/74   Pulse: 62    Resp: 18    Temp: 98.3  F (36.8  C)    SpO2: 98%    Weight: 41.7 kg (92 lb)    Height: 1.375 m (4' 6.13\")      Body mass index is 22.07 kg/m .    General: Sitting comfortably. No acute distress.   HEENT: Conjunctivae are clear, nonicteric. Tearful talking about sx.  Neck: No masses appreciated.  Respiratory: No respiratory distress. Lung sounds are clear without rales, ronchi, or wheezes.   Cardiac: RRR. No m/g/r. Brisk cap refill.  Abdominal: Abdomen is soft and non-tender without distention.  Extremities: Upper and lower extremities grossly normal. No back tenderness.  Skin: Skin in warm without rashes.  Neurological: Motor function is grossly normal. Moves all extremities well. Normal sensation.    "

## 2022-04-28 NOTE — NURSING NOTE
Due to patient being non-English speaking/uses sign language, an  was used for this visit. Only for face-to-face interpretation by an external agency, date and length of interpretation can be found on the scanned worksheet.     name: Florence Black  Agency: [employer]  Language: Jagruti   Telephone number: [phone]  Type of interpretation: Face-to-face, spoken

## 2022-05-23 ENCOUNTER — OFFICE VISIT (OUTPATIENT)
Dept: FAMILY MEDICINE | Facility: CLINIC | Age: 77
End: 2022-05-23
Payer: COMMERCIAL

## 2022-05-23 VITALS
BODY MASS INDEX: 21.23 KG/M2 | OXYGEN SATURATION: 97 % | HEIGHT: 55 IN | WEIGHT: 91.75 LBS | SYSTOLIC BLOOD PRESSURE: 131 MMHG | RESPIRATION RATE: 20 BRPM | HEART RATE: 66 BPM | DIASTOLIC BLOOD PRESSURE: 74 MMHG | TEMPERATURE: 97.9 F

## 2022-05-23 DIAGNOSIS — E44.1 MILD MALNUTRITION (H): ICD-10-CM

## 2022-05-23 DIAGNOSIS — M48.062 SPINAL STENOSIS OF LUMBAR REGION WITH NEUROGENIC CLAUDICATION: Primary | ICD-10-CM

## 2022-05-23 PROCEDURE — 99214 OFFICE O/P EST MOD 30 MIN: CPT | Mod: GC | Performed by: STUDENT IN AN ORGANIZED HEALTH CARE EDUCATION/TRAINING PROGRAM

## 2022-05-23 RX ORDER — CAPSAICIN 0.75 MG/G
CREAM TOPICAL 3 TIMES DAILY
Qty: 120 G | Refills: 3 | Status: SHIPPED | OUTPATIENT
Start: 2022-05-23 | End: 2022-10-17

## 2022-05-23 ASSESSMENT — PAIN SCALES - GENERAL: PAINLEVEL: NO PAIN (0)

## 2022-05-23 ASSESSMENT — PATIENT HEALTH QUESTIONNAIRE - PHQ9: SUM OF ALL RESPONSES TO PHQ QUESTIONS 1-9: 6

## 2022-05-23 NOTE — PROGRESS NOTES
Due to patient being non-English speaking/uses sign language, an  was used for this visit. Only for face-to-face interpretation by an external agency, date and length of interpretation can be found on the scanned worksheet.     name: Florence  Agency: [employer]  Language: Jagruti   Telephone number: [phone]  Type of interpretation: Face-to-face, spoken     c/o of  vaginal bleeding started yesterday and got worse today also having pain on her rt side when she  takes a deep  breath

## 2022-05-23 NOTE — LETTER
Physician Letter    5/23/2022    Re: See You  1945      To Whom It May Concern:     Please allow key for son as patient has significant difficulties getting to door, using steps, secondary to medical condition.         Adi Schrader MD  5/23/2022 10:43 AM

## 2022-05-23 NOTE — PROGRESS NOTES
"Assessment and Plan     (M48.062) Spinal stenosis of lumbar region with neurogenic claudication  (primary encounter diagnosis)  Comment:   Slightly better than prior since starting voltaren gel which she applies twice daily. Has low back pain with bilateral LE radicular sx. No red flags. CT imaging from 10/2021 shows transitional L5 partially sacralized and severe lumbar stenosis. Prior was on mobic and tylenol but minimal-to-no relief. Tried gabapentin and duloxetine and got very drowsy and unwilling to trial again. Doesn't want to trial an anti-spasmodic and doubt it would help much anyways. Has been to PT a few times but overall does not have a 'positive attitude' about PT and also is not willing to return because \"there are still too many illnesses going around\" despite my explanation that PT is going to be immensely important for her sx. Was seen by spine clinic 12/7 who ordered lumbar MRI to help determine if injection would benefit patient however she is unwilling to get this and doesn't understand how it'll benefit despite us discussing it mutliple times.  Similarly spine clinic wanted her to go see PT more. Also offered acupuncture trial but patient unwilling to try this right now as she has her doubts it will help. Have had numerous discussions regarding treatment and management especially given difficulty with following our recommendations, but seems to be that she is getting some relief with the topicals and is comfortable with continuing this management plan for now.  Plan:   -Strongly recommended reestablishing with PT and spine clinic  -Continue supportive cares such as hot/cold packs, home PT   -capsaicin (ZOSTRIX) 0.075 % cream TID as needed; counseled on using more consistently especially during initiation phase  -diclofenac (VOLTAREN) 1 % topical gel twice daily as needed; counseled on correct amount/use  -Return in next couple months    (E44.1) Mild malnutrition (H)  Comment: BMI 21.85. Overall " "thinner woman but does endorse eating multiple meals a day. Not on any supplements.  Plan:   -Discussed diet    Options for treatment and follow-up care were reviewed with the patient and/or guardian. See You and/or guardian engaged in the decision making process and verbalized understanding of the options discussed and agreed with the final plan.    Adi Schrader MD      Precepted today with: Lynn Olivo MD           HPI:       See You is a 76 year old  female with pertinent hx of lumbar stenosis with radicular pain who presents for back pain:    Says back pain is slightly better since last visit now with use of voltaren 1% gel. Applies it twice daily to lower back and some to quads and these places are where she gets most of the pain.   Denies any new change in strength or sensation. No falls.  Tried duloxetine, even at the low dose was not able to tolerate as it made her feel \"far too drowsy\" and thus stopped and is unwilling to trial other similar meds.  Still not willing to go back to spine or PT at this juncture, prefers to trial more topicals.  Does not want to do acupuncture as someone told her it doesn't help that much.    [facility] [email or web address]  was used for this visit         PMHX:     Patient Active Problem List   Diagnosis     Major depression     Esophageal reflux     Generalized osteoarthrosis, unspecified site     Hyperlipidemia     Osteoporosis     Prediabetes     Benign paroxysmal positional vertigo of left ear     Hypocitraturia     Nephrolithiasis, uric acid     Mild malnutrition (H)       Current Outpatient Medications   Medication Sig Dispense Refill     capsaicin (ZOSTRIX) 0.075 % cream Apply topically 3 times daily 120 g 3     diclofenac (VOLTAREN) 1 % topical gel Apply 2 g topically 2 times daily as needed for moderate pain 100 g 1     acetaminophen (TYLENOL) 500 MG tablet Take 1 tablet (500 mg) by mouth every 4 hours as needed for pain 90 tablet 3     alendronate (FOSAMAX) 70 MG tablet Take 1 " "tablet (70 mg) by mouth every 7 days Take 60 minutes before am meal with 8 oz. water. Remain upright for 30 minutes. (Patient not taking: Reported on 2/4/2022) 25 tablet 1     Incontinence Supply Disposable (DEPEND ADJUSTABLE UNDERWEAR) MISC 1 Application 2 times daily as needed (Urinay incontinence) (Patient not taking: Reported on 2/4/2022) 180 each 3     Nutritional Supplements (ENSURE HIGH PROTEIN) Take 1 Can by mouth 2 times daily 60 Can 11     Nutritional Supplements (ENSURE) LIQD DRINK CONTENT OF 1 BOTTLE BY MOUTH TWICE A DAY 40622 mL 3     omeprazole (PRILOSEC OTC) 20 MG EC tablet Take 20 mg by mouth         Social History     Tobacco Use     Smoking status: Never Smoker     Smokeless tobacco: Never Used   Substance Use Topics     Alcohol use: No     Drug use: No          Allergies   Allergen Reactions     Nka [No Known Allergies]        No results found for this or any previous visit (from the past 24 hour(s)).         Review of Systems:     10 point ROS negative except for what is noted in HPI          Physical Exam:     Vitals:    05/23/22 1009   BP: 131/74   Pulse: 66   Resp: 20   Temp: 97.9  F (36.6  C)   TempSrc: Oral   SpO2: 97%   Weight: 41.6 kg (91 lb 12 oz)   Height: 1.38 m (4' 6.33\")     Body mass index is 21.85 kg/m .    General: Sitting comfortably. Well kempt. No acute distress.   HEENT: Conjunctivae are clear.  Respiratory: No respiratory distress.   Cardiac: Warm, well perfused. Brisk cap refill.  Abdominal: Abdomen is thin, soft.  Extremities: Upper and lower extremities grossly normal.  Skin: Skin in warm without rashes.  Neurological: Motor function is grossly normal. Moves all extremities well. 5/5 strength in b/l LEs. Vertebrae nontender.  Psychiatric: Normal affect.     "

## 2022-05-26 NOTE — PROGRESS NOTES
Faculty Supervision of Residents   I have examined this patient and the medical care has been evaluated and discussed with the resident. See resident note outlining our discussion.      Nkechi Olivo MD

## 2022-05-31 DIAGNOSIS — E44.1 MILD MALNUTRITION (H): ICD-10-CM

## 2022-06-01 DIAGNOSIS — E44.1 MILD MALNUTRITION (H): ICD-10-CM

## 2022-06-01 RX ORDER — FEEDER CONTAINER WITH PUMP SET
1 EACH MISCELLANEOUS
Qty: 7110 ML | Refills: 4 | Status: SHIPPED | OUTPATIENT
Start: 2022-06-01 | End: 2022-10-27

## 2022-06-02 RX ORDER — FEEDER CONTAINER WITH PUMP SET
1 EACH MISCELLANEOUS
Qty: 7110 ML | OUTPATIENT
Start: 2022-06-02

## 2022-06-02 RX ORDER — LACTOSE-REDUCED FOOD
LIQUID (ML) ORAL
Qty: 15168 ML | Refills: 1 | Status: SHIPPED | OUTPATIENT
Start: 2022-06-02 | End: 2022-11-07

## 2022-06-13 DIAGNOSIS — M48.062 SPINAL STENOSIS OF LUMBAR REGION WITH NEUROGENIC CLAUDICATION: ICD-10-CM

## 2022-06-13 NOTE — TELEPHONE ENCOUNTER
Message to physician:     Date of last visit: 5/23/2022    Date of next visit if scheduled: none    Potassium   Date Value Ref Range Status   10/21/2021 3.5 3.5 - 5.0 mmol/L Final   02/16/2018 3.6 3.4 - 5.3 mmol/L Final     Creatinine   Date Value Ref Range Status   10/21/2021 0.75 0.60 - 1.10 mg/dL Final   02/16/2018 0.6 0.6 - 1.3 mg/dL Final     GFR Estimate   Date Value Ref Range Status   10/21/2021 79 >60 mL/min/1.73m2 Final     Comment:     As of July 11, 2021, eGFR is calculated by the CKD-EPI creatinine equation, without race adjustment. eGFR can be influenced by muscle mass, exercise, and diet. The reported eGFR is an estimation only and is only applicable if the renal function is stable.   11/04/2018 >60 >60 mL/min/1.73m2 Final   07/08/2014 >60 >60 mL/min/1.73m2 Final       BP Readings from Last 3 Encounters:   05/23/22 131/74   04/28/22 125/74   02/04/22 138/83       Hemoglobin A1C POCT   Date Value Ref Range Status   08/23/2018 5.9 (H) 4.1 - 5.7 % Final       Please complete refill and CLOSE ENCOUNTER.  Closing the encounter signifies the refill is complete.

## 2022-10-02 ENCOUNTER — HOSPITAL ENCOUNTER (EMERGENCY)
Facility: HOSPITAL | Age: 77
Discharge: HOME | End: 2022-10-02
Attending: EMERGENCY MEDICINE | Admitting: EMERGENCY MEDICINE
Payer: COMMERCIAL

## 2022-10-02 VITALS
HEART RATE: 54 BPM | HEIGHT: 56 IN | RESPIRATION RATE: 12 BRPM | TEMPERATURE: 98 F | OXYGEN SATURATION: 96 % | SYSTOLIC BLOOD PRESSURE: 128 MMHG | WEIGHT: 91 LBS | BODY MASS INDEX: 20.47 KG/M2 | DIASTOLIC BLOOD PRESSURE: 62 MMHG

## 2022-10-02 DIAGNOSIS — M25.512 ACUTE PAIN OF LEFT SHOULDER: ICD-10-CM

## 2022-10-02 DIAGNOSIS — M79.2 RADICULAR PAIN IN LEFT ARM: ICD-10-CM

## 2022-10-02 PROBLEM — N20.0 URIC ACID NEPHROLITHIASIS: Status: ACTIVE | Noted: 2019-08-21

## 2022-10-02 PROBLEM — R82.991 HYPOCITRATURIA: Status: ACTIVE | Noted: 2019-08-21

## 2022-10-02 PROCEDURE — 99283 EMERGENCY DEPT VISIT LOW MDM: CPT

## 2022-10-02 PROCEDURE — 250N000013 HC RX MED GY IP 250 OP 250 PS 637: Performed by: EMERGENCY MEDICINE

## 2022-10-02 RX ORDER — METHYLPREDNISOLONE 4 MG
TABLET, DOSE PACK ORAL
Qty: 21 TABLET | Refills: 0 | Status: SHIPPED | OUTPATIENT
Start: 2022-10-02 | End: 2022-10-17

## 2022-10-02 RX ORDER — ACETAMINOPHEN 325 MG/1
975 TABLET ORAL ONCE
Status: COMPLETED | OUTPATIENT
Start: 2022-10-02 | End: 2022-10-02

## 2022-10-02 RX ORDER — METHYLPREDNISOLONE 4 MG
TABLET, DOSE PACK ORAL
Qty: 21 TABLET | Refills: 0 | Status: SHIPPED | OUTPATIENT
Start: 2022-10-02 | End: 2022-10-02

## 2022-10-02 RX ADMIN — ACETAMINOPHEN 975 MG: 325 TABLET, FILM COATED ORAL at 10:01

## 2022-10-02 RX ADMIN — OXYCODONE HYDROCHLORIDE 2.5 MG: 5 TABLET ORAL at 10:36

## 2022-10-02 ASSESSMENT — ENCOUNTER SYMPTOMS
VOMITING: 0
FEVER: 0
DIARRHEA: 0
SHORTNESS OF BREATH: 0
CHILLS: 0

## 2022-10-02 NOTE — ED TRIAGE NOTES
Pt c/o left shoulder pain,left shoulder blade ad left elbow pain for 2 weeks. No injjury. Pain worse with movement. Pt speaks hmong only. Family with her.

## 2022-10-02 NOTE — DISCHARGE INSTRUCTIONS
Continue topical gels for your pain  Ice or heat to area for pain  Please take scheduled Tylenol, 500 mg every 4 hours or 650 mg every 6 hours  Start Medrol Dosepak as prescribed to help with pain and inflammation.  This was sent to [facility] in [facility]  Follow-up closely with your primary provider for further evaluation discussion about further treatment such as physical therapy or obtaining an MRI of your cervical spine  I did send a referral to the spine center for your convenience  Return if high fevers, chest pain, shortness of breath, unable to move your left arm or any other concerning findings.

## 2022-10-02 NOTE — ED PROVIDER NOTES
EMERGENCY DEPARTMENT ENCOUNTER      NAME: Neil Orta  AGE: 76 year old female  YOB: 1945  MRN: [id number]  EVALUATION DATE & TIME: 10/2/2022  9:31 AM    PCP: Adi Schrader    ED PROVIDER: Farheen Barr DO      Chief Complaint   Patient presents with     Shoulder Pain         FINAL IMPRESSION:  1. Radicular pain in left arm    2. Acute pain of left shoulder          ED COURSE & MEDICAL DECISION MAKING:    Pertinent Labs & Imaging studies reviewed. (See chart for details)  9:38 AM The medical student met the patient and performed my initial interview and exam.  10:10 AM I met with the patient and family    76 year old female presents to the Emergency Department for evaluation of left arm and shoulder pain.  Symptoms started around couple weeks ago and now is constant.  She gets more relief by lifting her left arm over her head.  Worse with movement of her arm.  It was located to her shoulder blade and now she feels sharp pain into her upper arm.  Does not extend past her elbow.  No chest pain or shortness of breath.  Not consistent with AAA, dissection, ACS.  Her lung sounds are clear.  She has intact pulses, sensation and 5 out of 5 strength in bilateral upper extremities in proximal and distal muscle groups.  She has a normal gait.  She has a history of lumbar stenosis with radicular pain.  She has been using Voltaren gel, cast basin and cupping at home without relief.  No midline cervical or thoracic tenderness.  No report of trauma.  Not consistent with a septic joint. No evidence of cauda equina, instability or cancer history to raise red flags.  Symptoms most consistent with radicular cause of her pain.  Did discuss this is likely coming from her cervical spine.  No indication for MRI today.  We will do symptomatic care.  She was given small dose opiate and Tylenol in the ED.  I will start her on a Medrol dose pack.  Encouraged her to continue Tylenol, ice and heat, she may continue the topical gels  as well and follow close with her primary provider for consideration of physical therapy, imaging.  Spine center referral placed.    At the conclusion of the encounter I discussed the results of all of the tests and the disposition. The questions were answered. The patient or family acknowledged understanding and was agreeable with the care plan.     MEDICATIONS GIVEN IN THE EMERGENCY:  Medications   oxyCODONE IR (ROXICODONE) half-tab 2.5 mg (has no administration in time range)   acetaminophen (TYLENOL) tablet 975 mg (975 mg Oral Given 10/2/22 1001)       NEW PRESCRIPTIONS STARTED AT TODAY'S ER VISIT  New Prescriptions    METHYLPREDNISOLONE (MEDROL DOSEPAK) 4 MG TABLET THERAPY PACK    Follow Package Directions          =================================================================    HPI    Patient information was obtained from: patient and patient's family     Use of : N/A       Neil You is a 76 year old female with a pertinent history of hyperlipidemia, osteoarthritis, and osteoporosis who presents to this ED by walk in for evaluation of shoulder pain.    For the past 2 weeks, the patient has been experiencing persistent achy pain in her left shoulder that radiates into her left elbow. It sometimes migrates from her shoulder to her elbow and vice versa. Her pain is most comfortable when she holds her hand up and rests her let hand on the top of her head. Patient denies trauma to the shoulder as well as increased use of her left arm. In the ED, the patient rated her pain as 10/10 and her family reports it has been a 10/10 since onset. She has tried a topical cream, which was previously prescribed to her for back pain, that provided no relief. She has not been seen by her primary care provider yet. The patient has not had surgery or broken a bone in her left arm. The patient denies fever, chills, shortness of breath, chest pain, diarrhea, vomiting, rash, or any other complaints at this time.     REVIEW  OF SYSTEMS   Review of Systems   Constitutional: Negative for chills and fever.   Respiratory: Negative for shortness of breath.    Cardiovascular: Negative for chest pain.   Gastrointestinal: Negative for diarrhea and vomiting.   Musculoskeletal:        Positive for shoulder pain (left) and elbow pain (left). Negative for shoulder injury.    Skin: Negative for rash.   All other systems reviewed and are negative.      PAST MEDICAL HISTORY:  Past Medical History:   Diagnosis Date     Hyperlipidemia      Kidney stone      Kidney stones     Required intervention     Osteoarthritis        PAST SURGICAL HISTORY:  Past Surgical History:   Procedure Laterality Date     CHOLECYSTECTOMY       CA ERCP W/BIOPSY SINGLE/MULTIPLE             CURRENT MEDICATIONS:    methylPREDNISolone (MEDROL DOSEPAK) 4 MG tablet therapy pack  acetaminophen (TYLENOL) 500 MG tablet  alendronate (FOSAMAX) 70 MG tablet  capsaicin (ZOSTRIX) 0.075 % cream  diclofenac (VOLTAREN) 1 % topical gel  Incontinence Supply Disposable (DEPEND ADJUSTABLE UNDERWEAR) MISC  Nutritional Supplements (ENSURE HIGH PROTEIN)  Nutritional Supplements (ENSURE) LIQD  omeprazole (PRILOSEC OTC) 20 MG EC tablet         ALLERGIES:  Allergies   Allergen Reactions     Nka [No Known Allergies]        FAMILY HISTORY:  Family History   Problem Relation Age of Onset     Diabetes No family hx of      Coronary Artery Disease No family hx of      Breast Cancer No family hx of      Cancer - colorectal No family hx of      Ovarian Cancer No family hx of      Prostate Cancer No family hx of      Hypertension No family hx of      Other Cancer No family hx of      Mental Illness No family hx of      Cerebrovascular Disease No family hx of      Anesthesia Reaction No family hx of      Asthma No family hx of      Osteoporosis No family hx of      Known Genetic Syndrome No family hx of      Obesity No family hx of      Unknown/Adopted No family hx of      Kidney Disease No family hx of   "      SOCIAL HISTORY:   Social History     Socioeconomic History     Marital status: Single   Tobacco Use     Smoking status: Never Smoker     Smokeless tobacco: Never Used   Substance and Sexual Activity     Alcohol use: No     Drug use: No     Sexual activity: Never   Social History Narrative    This January moved to [place] from [place], [place]. [place] lived with her son and his family, however, in January they kicked her out of the house and she relocated to be near her brother here. Here she lives alone in a small apartment.        VITALS:  BP (!) 142/69   Pulse 56   Temp 98  F (36.7  C) (Tympanic)   Resp 12   Ht 1.422 m (4' 8\")   Wt 41.3 kg (91 lb)   SpO2 95%   BMI 20.40 kg/m      PHYSICAL EXAM    Physical Exam  Constitutional:       General: She is not in acute distress.  HENT:      Head: Normocephalic and atraumatic.      Mouth/Throat:      Pharynx: Oropharynx is clear.   Eyes:      Pupils: Pupils are equal, round, and reactive to light.   Cardiovascular:      Rate and Rhythm: Normal rate and regular rhythm.      Pulses: Normal pulses.           Radial pulses are 2+ on the right side and 2+ on the left side.      Heart sounds: Normal heart sounds.   Pulmonary:      Effort: Pulmonary effort is normal.      Breath sounds: Normal breath sounds.   Abdominal:      General: Abdomen is flat. Bowel sounds are normal.      Palpations: Abdomen is soft.      Tenderness: There is no abdominal tenderness.   Musculoskeletal:         General: Normal range of motion.      Left shoulder: No bony tenderness. Normal range of motion. Normal strength. Normal pulse.      Cervical back: Normal and full passive range of motion without pain. No rigidity. No spinous process tenderness or muscular tenderness.      Thoracic back: Normal.      Comments: Tenderness of left trapezius and medial to her scapula   Skin:     General: Skin is warm and dry.      Capillary Refill: Capillary refill takes less than 2 seconds. "   Neurological:      General: No focal deficit present.      Mental Status: She is alert and oriented to person, place, and time.      Cranial Nerves: Cranial nerves 2-12 are intact.      Sensory: Sensation is intact.      Motor: Motor function is intact. No abnormal muscle tone.      Gait: Gait is intact.      Comments: 5/5 strength in proximal and distal muscle groups in bilateral upper and lower extremities          I, Thais Dorantes, am serving as a scribe to document services personally performed by Dr. Farheen Barr based on my observation and the provider's statements to me. IFarheen DO attest that Thais Dorantes is acting in a scribe capacity, has observed my performance of the services and has documented them in accordance with my direction.    Farheen Barr DO  Emergency Medicine  [facility] EMERGENCY DEPARTMENT  [address] [place] [id number]  [phone]  Dept: [phone]     Farheen Barr DO  10/02/22 1033

## 2022-10-03 ENCOUNTER — PATIENT OUTREACH (OUTPATIENT)
Dept: CARE COORDINATION | Facility: CLINIC | Age: 77
End: 2022-10-03

## 2022-10-03 NOTE — PROGRESS NOTES
Clinic Care Coordination Contact    Follow Up Progress Note      Assessment: The pt was recently in the ED, I called to check up on the pt and help the pt setup a ED follow up. The pt was at [facility] for a shoulder pain. I called the pt,but got her vm, so I lef a vm for the pt to give me a call back.     Care Gaps:    Health Maintenance Due   Topic Date Due     DEPRESSION ACTION PLAN  Never done     ZOSTER IMMUNIZATION (1 of 2) Never done     MAMMO SCREENING  03/16/2020     COLORECTAL CANCER SCREENING  06/12/2020     LIPID  06/29/2022     COVID-19 Vaccine (5 - Booster for Moderna series) 07/05/2022     MEDICARE ANNUAL WELLNESS VISIT  08/02/2022     ADVANCE CARE PLANNING  08/29/2022     INFLUENZA VACCINE (1) 09/01/2022           Care Plans      Intervention/Education provided during outreach:               Plan:     Care Coordinator will follow up in

## 2022-10-04 ENCOUNTER — PATIENT OUTREACH (OUTPATIENT)
Dept: CARE COORDINATION | Facility: CLINIC | Age: 77
End: 2022-10-04

## 2022-10-04 NOTE — PROGRESS NOTES
Clinic Care Coordination Contact    Follow Up Progress Note      Assessment: The pt was recently in the ED, I called to check up on the pt and help the pt setup a ED follow up. The pt was at [facility] for a shoulder pain. I called and talked to the pt, pt stated that she still in some pain, and feel weak. Pt stated that she is following up tomorrow at 10:20am with .     Care Gaps:    Health Maintenance Due   Topic Date Due     DEPRESSION ACTION PLAN  Never done     ZOSTER IMMUNIZATION (1 of 2) Never done     MAMMO SCREENING  03/16/2020     COLORECTAL CANCER SCREENING  06/12/2020     LIPID  06/29/2022     COVID-19 Vaccine (5 - Booster for Moderna series) 07/05/2022     MEDICARE ANNUAL WELLNESS VISIT  08/02/2022     ADVANCE CARE PLANNING  08/29/2022     INFLUENZA VACCINE (1) 09/01/2022           Care Plans      Intervention/Education provided during outreach:               Plan:     Care Coordinator will follow up in

## 2022-10-05 ENCOUNTER — OFFICE VISIT (OUTPATIENT)
Dept: FAMILY MEDICINE | Facility: CLINIC | Age: 77
End: 2022-10-05
Payer: COMMERCIAL

## 2022-10-05 VITALS
DIASTOLIC BLOOD PRESSURE: 73 MMHG | WEIGHT: 92 LBS | RESPIRATION RATE: 20 BRPM | HEIGHT: 55 IN | SYSTOLIC BLOOD PRESSURE: 133 MMHG | TEMPERATURE: 98.6 F | OXYGEN SATURATION: 96 % | HEART RATE: 72 BPM | BODY MASS INDEX: 21.29 KG/M2

## 2022-10-05 DIAGNOSIS — M25.511 RIGHT SHOULDER PAIN, UNSPECIFIED CHRONICITY: Primary | ICD-10-CM

## 2022-10-05 DIAGNOSIS — M48.02 SPINAL STENOSIS IN CERVICAL REGION: ICD-10-CM

## 2022-10-05 PROCEDURE — 99213 OFFICE O/P EST LOW 20 MIN: CPT | Mod: GC

## 2022-10-05 ASSESSMENT — PATIENT HEALTH QUESTIONNAIRE - PHQ9: SUM OF ALL RESPONSES TO PHQ QUESTIONS 1-9: 8

## 2022-10-05 NOTE — PROGRESS NOTES
"  Assessment & Plan   76 year old female here today for ED follow up. Was seen in ED on 10/2/22 for left shoulder pain, felt to be radicular in nature 2/2 to known lumbar stenosis.     Shoulder Pain, Left   Progression of Spinal Stenosis   Patient with known diagnosis of spinal stenosis. Physical exam today is concerning for progression of disease leading to worsening shoulder and arm pain. Paresthesias throughout lateral portion of left arm, tenderness throughout and worse over left shoulder blade, strength intact but patient hesitant for large range of movement. I am not concerned for septic joint based on patient being afebrile with no edema to joint or overlying erythema. I also am not concerned for rotator cuff injury as patient has full strength and range of motion on encouragement.      - MRI cervical spine    - Help connect with spine orthopedist (referral placed in ED)    - PT referral    - Continue methylprednisolone (prescibed in ED)    - Scheduled Tylenol alternating with NSAID    - Continue voltaren gel     Weakness   Fall Risk   Patient is at risk for falls. She has a cane that she uses to ambulate at home. Offered walker, patient declines at this time.      - Continue to use cane     Subjective   See is a 76 year old woman presenting for the following health issues:  Shoulder pain  (Pain on the left shoulder/ muscle pain/ R hip pain /Went to the ER and does get the referral for the spine )    HPI   Patient following up for ED visit. Left shoulder pain. Tender to touch, relieved when raising above head. Range of motion in tact. Does not seem to be improved with NSAIDs, heat, ice.      Review of Systems   10 point ROS completed and negative aside from those listed above.       Objective    /73   Pulse 72   Temp 98.6  F (37  C) (Oral)   Resp 20   Ht 1.372 m (4' 6\")   Wt 41.7 kg (92 lb)   SpO2 96%   BMI 22.18 kg/m    Body mass index is 22.18 kg/m .  Physical Exam   General: Tearful, clearly " in pain. Grasping left shoulder.   HEENT: Conjunctivae are clear, nonicteric.   Neck: No masses appreciated.  Respiratory: No respiratory distress. Lung sounds are clear without rales, ronchi, or wheezes.   Cardiac: RRR. No m/g/r. Brisk cap refill.  Abdominal: Abdomen is soft and non-tender without distention.  Extremities: Left shoulder pain on palpation, worse with palpation over shoulder blade. Sensation intact, but endorses paresthesias. Normal strength and range of motion.   Skin: Skin in warm without rashes.  Neurological: Motor function is grossly normal.  Psychiatric: Good insight.

## 2022-10-05 NOTE — PATIENT INSTRUCTIONS
- repeat MRI cervical spine   - await call from spine specialist (referral already placed)   - PT consult placed   - continue methylprednisolone  - continue voltaren gel, scheduled ibuprofen, heat, and ice for pain management   - RTC following visit with spine specialist

## 2022-10-14 ENCOUNTER — HOSPITAL ENCOUNTER (OUTPATIENT)
Dept: MRI IMAGING | Facility: HOSPITAL | Age: 77
Discharge: HOME | End: 2022-10-14
Attending: FAMILY MEDICINE | Admitting: FAMILY MEDICINE
Payer: COMMERCIAL

## 2022-10-14 PROCEDURE — 72141 MRI NECK SPINE W/O DYE: CPT

## 2022-10-17 ENCOUNTER — OFFICE VISIT (OUTPATIENT)
Dept: FAMILY MEDICINE | Facility: CLINIC | Age: 77
End: 2022-10-17
Payer: COMMERCIAL

## 2022-10-17 ENCOUNTER — PATIENT OUTREACH (OUTPATIENT)
Dept: CARE COORDINATION | Facility: CLINIC | Age: 77
End: 2022-10-17

## 2022-10-17 VITALS
RESPIRATION RATE: 18 BRPM | DIASTOLIC BLOOD PRESSURE: 69 MMHG | BODY MASS INDEX: 21.29 KG/M2 | WEIGHT: 92 LBS | HEART RATE: 78 BPM | OXYGEN SATURATION: 99 % | TEMPERATURE: 98.7 F | HEIGHT: 55 IN | SYSTOLIC BLOOD PRESSURE: 139 MMHG

## 2022-10-17 DIAGNOSIS — Z23 NEED FOR PROPHYLACTIC VACCINATION AND INOCULATION AGAINST INFLUENZA: ICD-10-CM

## 2022-10-17 DIAGNOSIS — Z12.31 ENCOUNTER FOR SCREENING MAMMOGRAM FOR MALIGNANT NEOPLASM OF BREAST: ICD-10-CM

## 2022-10-17 DIAGNOSIS — R73.9 HYPERGLYCEMIA: ICD-10-CM

## 2022-10-17 DIAGNOSIS — M25.512 CHRONIC LEFT SHOULDER PAIN: ICD-10-CM

## 2022-10-17 DIAGNOSIS — Z00.00 ENCOUNTER FOR MEDICARE ANNUAL WELLNESS EXAM: Primary | ICD-10-CM

## 2022-10-17 DIAGNOSIS — G89.29 CHRONIC LEFT SHOULDER PAIN: ICD-10-CM

## 2022-10-17 DIAGNOSIS — M81.0 AGE-RELATED OSTEOPOROSIS WITHOUT CURRENT PATHOLOGICAL FRACTURE: ICD-10-CM

## 2022-10-17 DIAGNOSIS — Z00.00 WELLNESS EXAMINATION: Primary | ICD-10-CM

## 2022-10-17 LAB
CHOLEST SERPL-MCNC: 232 MG/DL
HBA1C MFR BLD: 6.1 % (ref 0–5.6)
HDLC SERPL-MCNC: 77 MG/DL
LDLC SERPL CALC-MCNC: 111 MG/DL
NONHDLC SERPL-MCNC: 155 MG/DL
TRIGL SERPL-MCNC: 218 MG/DL

## 2022-10-17 PROCEDURE — 80061 LIPID PANEL: CPT | Performed by: STUDENT IN AN ORGANIZED HEALTH CARE EDUCATION/TRAINING PROGRAM

## 2022-10-17 PROCEDURE — 99213 OFFICE O/P EST LOW 20 MIN: CPT | Mod: 25 | Performed by: STUDENT IN AN ORGANIZED HEALTH CARE EDUCATION/TRAINING PROGRAM

## 2022-10-17 PROCEDURE — 36415 COLL VENOUS BLD VENIPUNCTURE: CPT | Performed by: STUDENT IN AN ORGANIZED HEALTH CARE EDUCATION/TRAINING PROGRAM

## 2022-10-17 PROCEDURE — 99207 PR NO CHARGE NURSE ONLY: CPT

## 2022-10-17 PROCEDURE — 90662 IIV NO PRSV INCREASED AG IM: CPT | Performed by: STUDENT IN AN ORGANIZED HEALTH CARE EDUCATION/TRAINING PROGRAM

## 2022-10-17 PROCEDURE — 0124A COVID-19,PF,PFIZER BOOSTER BIVALENT: CPT | Performed by: STUDENT IN AN ORGANIZED HEALTH CARE EDUCATION/TRAINING PROGRAM

## 2022-10-17 PROCEDURE — G0439 PPPS, SUBSEQ VISIT: HCPCS | Mod: GC | Performed by: STUDENT IN AN ORGANIZED HEALTH CARE EDUCATION/TRAINING PROGRAM

## 2022-10-17 PROCEDURE — 83036 HEMOGLOBIN GLYCOSYLATED A1C: CPT | Performed by: STUDENT IN AN ORGANIZED HEALTH CARE EDUCATION/TRAINING PROGRAM

## 2022-10-17 PROCEDURE — 91312 COVID-19,PF,PFIZER BOOSTER BIVALENT: CPT | Performed by: STUDENT IN AN ORGANIZED HEALTH CARE EDUCATION/TRAINING PROGRAM

## 2022-10-17 PROCEDURE — G0008 ADMIN INFLUENZA VIRUS VAC: HCPCS | Performed by: STUDENT IN AN ORGANIZED HEALTH CARE EDUCATION/TRAINING PROGRAM

## 2022-10-17 RX ORDER — RISEDRONATE SODIUM 35 MG/1
35 TABLET, FILM COATED ORAL
Qty: 90 TABLET | Refills: 0 | Status: SHIPPED | OUTPATIENT
Start: 2022-10-17 | End: 2023-12-29

## 2022-10-17 NOTE — LETTER
October 18, 2022      See You  [address]  [place] [address]        Dear ,    We are writing to inform you of your test results.    First at all, Thank you for visiting our clinic on Oct 17, 2022.     Your A1c (the test of how your sugars have been) is just slightly higher than prior but no big changes to your management at this time beyond what we talked about prior.        Resulted Orders   Lipid Profile   Result Value Ref Range    Cholesterol 232 (H) <200 mg/dL    Triglycerides 218 (H) <150 mg/dL    Direct Measure HDL 77 >=50 mg/dL    LDL Cholesterol Calculated 111 (H) <=100 mg/dL    Non HDL Cholesterol 155 (H) <130 mg/dL    Narrative    Cholesterol  Desirable:  <200 mg/dL    Triglycerides  Normal:  Less than 150 mg/dL  Borderline High:  150-199 mg/dL  High:  200-499 mg/dL  Very High:  Greater than or equal to 500 mg/dL    Direct Measure HDL  Female:  Greater than or equal to 50 mg/dL   Male:  Greater than or equal to 40 mg/dL    LDL Cholesterol  Desirable:  <100mg/dL  Above Desirable:  100-129 mg/dL   Borderline High:  130-159 mg/dL   High:  160-189 mg/dL   Very High:  >= 190 mg/dL    Non HDL Cholesterol  Desirable:  130 mg/dL  Above Desirable:  130-159 mg/dL  Borderline High:  160-189 mg/dL  High:  190-219 mg/dL  Very High:  Greater than or equal to 220 mg/dL   Hemoglobin A1c   Result Value Ref Range    Hemoglobin A1C 6.1 (H) 0.0 - 5.6 %      Comment:      Normal <5.7%   Prediabetes 5.7-6.4%    Diabetes 6.5% or higher     Note: Adopted from ADA consensus guidelines.       If you have any questions or concerns, please call the clinic at the number listed above.       Sincerely,      Adi Schrader MD

## 2022-10-17 NOTE — PROGRESS NOTES
SUBJECTIVE:   See is a 77 year old who presents for Preventive Visit.    Are you in the first 12 months of your Medicare coverage?  No    HPI  Do you feel safe in your environment? Yes    Have you ever done Advance Care Planning? (For example, a Health Directive, POLST, or a discussion with a medical provider or your loved ones about your wishes): No, advance care planning information given to patient to review.  Patient declined advance care planning discussion at this time.    Fall risk  Fallen 2 or more times in the past year?: No  Any fall with injury in the past year?: No  Timed Up and Go Test (>13.5 is fall risk; contact physician) : 13    Cognitive Screening: Minicog normal    Do you have sleep apnea, excessive snoring or daytime drowsiness?: no    Reviewed and updated as needed this visit by clinical staff    Allergies  Meds              Reviewed and updated as needed this visit by Provider                 Social History     Tobacco Use     Smoking status: Never     Smokeless tobacco: Never   Substance Use Topics     Alcohol use: No     If you drink alcohol do you typically have >3 drinks per day or >7 drinks per week? No    No flowsheet data found.      Current providers sharing in care for this patient include:  Patient Care Team:  Adi Schrader MD as PCP - General (Student in organized health care education/training program)  Preethi Monterroso as   Adi Schrader MD as Assigned PCP    The following health maintenance items are reviewed in Epic and correct as of today:  Health Maintenance   Topic Date Due     DEPRESSION ACTION PLAN  Never done     HEPATITIS B IMMUNIZATION (1 of 3 - 3-dose series) Never done     ZOSTER IMMUNIZATION (1 of 2) Never done     MAMMO SCREENING  03/16/2020     COLORECTAL CANCER SCREENING  06/12/2020     LIPID  06/29/2022     COVID-19 Vaccine (5 - Booster for Moderna series) 07/05/2022     ADVANCE CARE PLANNING  08/29/2022     INFLUENZA VACCINE (1) 09/01/2022     PHQ-9   "04/05/2023     MEDICARE ANNUAL WELLNESS VISIT  10/17/2023     FALL RISK ASSESSMENT  10/17/2023     DTAP/TDAP/TD IMMUNIZATION (3 - Td or Tdap) 07/27/2028     DEXA  01/31/2034     HEPATITIS C SCREENING  Completed     Pneumococcal Vaccine: 65+ Years  Completed     IPV IMMUNIZATION  Aged Out     MENINGITIS IMMUNIZATION  Aged Out     Pertinent mammograms are reviewed under the imaging tab. Has not had screening before, OK to get done.      OBJECTIVE:   BP (!) 144/74   Pulse 78   Temp 98.7  F (37.1  C) (Oral)   Resp 18   Ht 1.38 m (4' 6.33\")   Wt 41.7 kg (92 lb)   SpO2 99%   BMI 21.91 kg/m   Estimated body mass index is 21.91 kg/m  as calculated from the following:    Height as of this encounter: 1.38 m (4' 6.33\").    Weight as of this encounter: 41.7 kg (92 lb).  Physical Exam  GENERAL APPEARANCE: healthy, alert and no distress  EYES: Eyes grossly normal to inspection, PERRL and conjunctivae and sclerae normal  HENT: Nose and mouth without ulcers or lesions, oropharynx clear and oral mucous membranes moist  NECK: no adenopathy, no asymmetry, masses, or scars and thyroid normal to palpation  RESP: lungs clear to auscultation - no rales, rhonchi or wheezes  BREAST: normal without masses, tenderness or nipple discharge and no palpable axillary masses or adenopathy  CV: regular rate and rhythm, no murmur, click or rub, no peripheral edema and peripheral pulses strong  ABDOMEN: soft, nontender, no hepatosplenomegaly, no masses and bowel sounds normal  MS: no musculoskeletal defects are noted and gait is age appropriate without ataxia  SKIN: no suspicious lesions or rashes  NEURO: Normal strength and tone, sensory exam grossly normal, mentation intact and speech normal  PSYCH: mentation appears normal and affect normal/bright    Diagnostic Test Results:  Labs reviewed in Epic    ASSESSMENT / PLAN:   (Z00.00) Encounter for Medicare annual wellness exam  (primary encounter diagnosis)  Comment: Due for screenings and " "vaccines.   Plan:   -Lipid Profile, A1c  -Fecal colorectal cancer screen FIT  -MA SCREENING DIGITAL BILAT  -COVID-19,PF,PFIZER BOOSTER BIVALENT 12+Yrs  -Flu shot    (R73.9) Hyperglycemia  Comment: X2 prior elevated sugars on checks, diagnosis of prediabetes. No recent A1c.  Plan:  -Hemoglobin A1c    (M25.512,  G89.29) Chronic left shoulder pain  Comment: Chronic left shoulder aches. No muscle weakness or lack of ROM at this time. No trauma and doubt rotator cuff injury. Wants to just try tylenol and voltaren gel.  Plan:   -Acetaminophen 325 MG CAPS  -Voltaren gel as needed up to 4x daily    (M81.0) Age-related osteoporosis without current pathological fracture  Comment: Dexa in 2021 showing osteoporosis. Was prescribed fosamax but didn't continue taking because it made her feel poorly; willing to try a different formulation.  Plan:   -Start RISEdronate (ACTONEL) 35 MG tablet weekly; counseled on correct use    (Z12.31) Encounter for screening mammogram for malignant neoplasm of breast  Comment: Never had mammogram before, willing to get breast cancer. No breast sx.  Plan:  -MA SCREENING DIGITAL BILAT      COUNSELING:  Reviewed preventive health counseling, as reflected in patient instructions       Regular exercise       Healthy diet/nutrition       Dental care       Fall risk prevention       Osteoporosis prevention/bone health       Colon cancer screening    Estimated body mass index is 21.91 kg/m  as calculated from the following:    Height as of this encounter: 1.38 m (4' 6.33\").    Weight as of this encounter: 41.7 kg (92 lb).      She reports that she has never smoked. She has never used smokeless tobacco.      Appropriate preventive services were discussed with this patient, including applicable screening as appropriate for cardiovascular disease, diabetes, osteopenia/osteoporosis, and glaucoma.  As appropriate for age/gender, discussed screening for colorectal cancer, prostate cancer, breast cancer, and " cervical cancer. Checklist reviewing preventive services available has been given to the patient.    Reviewed patients plan of care and provided an AVS. The Basic Care Plan (routine screening as documented in Health Maintenance) for See meets the Care Plan requirement. This Care Plan has been established and reviewed with the Patient.      Adi Schrader MD  [facility]

## 2022-10-17 NOTE — PROGRESS NOTES
Clinic Care Coordination Contact    Follow Up Progress Note      Assessment: I got a message from  about the pt. He wanted to refer the pt for a mamogram, but was not sure if she is able to get a medical ride for it. I called the pt, and informed her that her medical insurances does cover her rides to and from her medical appointment. I told her that  wanted to refer her to get a mamogram. Pt stated that she was not interested in a mamogram.     Care Gaps:    Health Maintenance Due   Topic Date Due     DEPRESSION ACTION PLAN  Never done     HEPATITIS B IMMUNIZATION (1 of 3 - 3-dose series) Never done     ZOSTER IMMUNIZATION (1 of 2) Never done     MAMMO SCREENING  03/16/2020     COLORECTAL CANCER SCREENING  06/12/2020     LIPID  06/29/2022           Care Plans      Intervention/Education provided during outreach:               Plan:     Care Coordinator will follow up in

## 2022-10-17 NOTE — PATIENT INSTRUCTIONS
Personalized Prevention Plan  You are due for the preventive services outlined below.  Your care team is available to assist you in scheduling these services.  If you have already completed any of these items, please share that information with your care team to update in your medical record.  Health Maintenance Due   Topic Date Due    Depression Action Plan  Never done    Hepatitis B Vaccine (1 of 3 - 3-dose series) Never done    Zoster (Shingles) Vaccine (1 of 2) Never done    Mammogram  03/16/2020    Colorectal Cancer Screening  06/12/2020    Cholesterol Lab  06/29/2022    COVID-19 Vaccine (5 - Booster for Moderna series) 07/05/2022    Discuss Advance Care Planning  08/29/2022    Flu Vaccine (1) 09/01/2022     Preventive Health Recommendations    See your health care provider every year to  Review health changes.   Discuss preventive care.    Review your medicines if your doctor has prescribed any.  You no longer need a yearly Pap test unless you've had an abnormal Pap test in the past 10 years. If you have vaginal symptoms, such as bleeding or discharge, be sure to talk with your provider about a Pap test.  Every 1 to 2 years, have a mammogram.  If you are over 69, talk with your health care provider about whether or not you want to continue having screening mammograms.  Every 10 years, have a colonoscopy. Or, have a yearly FIT test (stool test). These exams will check for colon cancer.   Have a cholesterol test every 5 years, or more often if your doctor advises it.   Have a diabetes test (fasting glucose) every three years. If you are at risk for diabetes, you should have this test more often.   At age 65, have a bone density scan (DEXA) to check for osteoporosis (brittle bone disease).    Shots:  Get a flu shot each year.  Get a tetanus shot every 10 years.  Talk to your doctor about your pneumonia vaccines. There are now two you should receive - Pneumovax (PPSV 23) and Prevnar (PCV 13).  Talk to your  pharmacist about the shingles vaccine.  Talk to your doctor about the hepatitis B vaccine.    Nutrition:   Eat at least 5 servings of fruits and vegetables each day.  Eat whole-grain bread, whole-wheat pasta and brown rice instead of white grains and rice.  Get adequate Calcium and Vitamin D.     Lifestyle  Exercise at least 150 minutes a week (30 minutes a day, 5 days a week). This will help you control your weight and prevent disease.  Limit alcohol to one drink per day.  No smoking.   Wear sunscreen to prevent skin cancer.   See your dentist twice a year for an exam and cleaning.  See your eye doctor every 1 to 2 years to screen for conditions such as glaucoma, macular degeneration and cataracts.    Personalized Prevention Plan  You are due for the preventive services outlined below.  Your care team is available to assist you in scheduling these services.  If you have already completed any of these items, please share that information with your care team to update in your medical record.  Health Maintenance   Topic Date Due    DEPRESSION ACTION PLAN  Never done    HEPATITIS B IMMUNIZATION (1 of 3 - 3-dose series) Never done    ZOSTER IMMUNIZATION (1 of 2) Never done    MAMMO SCREENING  03/16/2020    COLORECTAL CANCER SCREENING  06/12/2020    LIPID  06/29/2022    COVID-19 Vaccine (5 - Booster for Moderna series) 07/05/2022    ADVANCE CARE PLANNING  08/29/2022    INFLUENZA VACCINE (1) 09/01/2022    PHQ-9  04/05/2023    FALL RISK ASSESSMENT  05/23/2023    MEDICARE ANNUAL WELLNESS VISIT  10/17/2023    DTAP/TDAP/TD IMMUNIZATION (3 - Td or Tdap) 07/27/2028    DEXA  01/31/2034    HEPATITIS C SCREENING  Completed    Pneumococcal Vaccine: 65+ Years  Completed    IPV IMMUNIZATION  Aged Out    MENINGITIS IMMUNIZATION  Aged Out      PERSONAL PREVENTIVE SERVICES PLAN - SERVICES     Review these tests with your medical staff then decide which ones you want and take this page home for your reference      SCREENING TESTS      Description   Year of Last Screening   Recommended Today?   Heart disease screening blood tests  Cholesterol level Reducing cholesterol can reduce your risk of heart attacks by 25%.  Screening is recommended yearly if you are at risk of heart disease otherwise every 4-5 years 6/29/17 Yes; Recommended    Diabetes screening tests  Hemoglobin A1c blood test   Finding and treating diabetes early can reduce complications.  Screening recommended/covered yearly if you have high blood pressure, high cholesterol, obesity (BMI >30), or a history of high blood glucose tests; or 2 of the following: family history of diabetes, overweight (BMI >25 but <30), age 65 years or older, and a history of diabetes of pregnancy or gave birth to baby weighing more than 9 lbs. 8/23/18  hgbA1c  5.9 Yes; Recommended    Hepatitis B screening Finding hepatitis B early can reduce complications.  Screening is recommended for persons with selected risk factors.  No: is not indicated today.   Hepatitis C screening Finding hepatitis C early can reduce complications.  Screening is recommended for all persons born from 1945 through 1965 and for those with selected other risk factors.  2/16/18  negative No: is not indicated today.   HIV screening Finding HIV early can reduce complications.  Screening is recommended for persons with risk factors for HIV infection.  No: is not indicated today.   Glaucoma screening Early detection of glaucoma can prevent blindness.   Please talk to your eye doctor about this.       SCREENING TESTS     Description   Year of Last Screening   Recommended Today?   Colorectal cancer screening  Fecal occult blood test   Screening colonoscopy Screening for colon cancer has been shown to reduce death from colon cancer by 25-30%. Screening recommended to start at 50 years and continuing until age 75 years.    Yes; Recommended    Breast Cancer Screening (women)  Mammogram Mammogram screening for breast cancer has been shown to  reduce the risk of dying from breast cancer and prolong life. Screening is recommended every 1-2 years for women aged 50 to 74 years.  3/2/18  neg No: is not indicated today.   Cervical Cancer screening (women)  Pap Cervical pap smears can reduce cervical cancer. Screening is recommended annually if high risk (history of abnormal pap smears) otherwise every 2-3 years, stop screening at 65 years of age if history of normal paps. 6/20/19  neg No: is not indicated today.   Screening for Osteoporosis:  Bone mass measurements (women)  Dexa Scan Screening and treating Osteoporosis can reduce the risk of hip and spine fractures. Screening is recommended in women 65 years or older and in women and men at risk of osteoporosis. 1/30/19  osteoporosis Yes; Recommended    Screening for Lung Cancer   Low-dose CT scanning Screening can reduce mortality in persons aged 55-80 who have smoked at least 30 pack-years and who are either still smoking or have quit in the past 15 years.  No: is not indicated today.   Abdominal Aortic Aneurysm (AAA) screening  Ultrasound (US)   An aneurysm treated before rupture is very safe -a ruptured aneurysm can be fatal.  Screening  by US for AAA is limited to patients who meet one of the following criteria:  Men who are 65-75 years old and have smoked more than 100 cigarettes in their lifetime  Anyone with a family history of abdominal aortic aneurysm  No: is not indicated today.     Here are your recommended immunizations.  Take this home for your reference.                                                    IMMUNIZATIONS Description Recommend today?     Influenza (Flu shot) Prevents flu; should get every year Yes; Recommended    PCV 13 Pneumonia vaccination; you get it once No; is up to date.   PPSV 23 Second pneumonia vaccination; usually get it 1 year after PCV 13 No; is up to date.   Zoster (Shingles) Prevents shingles; you get it once  (Check with Part D insurance for coverage, must receive at  a pharmacy, not clinic) Yes; Recommended    Tetanus Prevents tetanus; once every 10 years No; is up to date.     Hepatitis B  If you have any of the following risk factors you should be immunized for hepatitis B: severe kidney disease, people who live in the same house as a carrier of Hepatitis B virus, people who live in  institutions (e.g. nursing homes or group homes), homosexual men, patients with hemophilia who received Factor VIII or IX concentrates, abusers of illicit injectable drugs No: is not indicated today.      PATIENT INSTRUCTIONS    Yearly exam:   See your health care provider every year in order to review changes in your health, review medicines that you take, and discuss preventive care needs such as immunizations and cancer screening.  Get a flu shot each year.     Advance Directives:  If you have not done so, you are encouraged to complete advance directives and/or a living will.   More information about advance directives can be found at: http://www.[place]med.org/advocacy/Key-Issues/Advance-Directives    Nutrition:   Eat at least 5 servings of fruits and vegetables each day.   Eat whole-grain bread, whole-wheat pasta and brown rice instead of white grains and rice.   Talk to your doctor about Calcium and Vitamin D.     Lifestyle:  Exercise for at least 150 minutes a week (30 minutes a day, 5 days a week). This will help you control your weight and prevent disease.   Limit alcohol to one drink per day.   If you smoke, try to quit - your doctor will be happy to help.   Wear sunscreen to prevent skin cancer.   See your dentist every six months for an exam and cleaning.   See your eye doctor every 1 to 2 years to screen for conditions such as glaucoma, macular degeneration and cataracts.

## 2022-10-17 NOTE — PROGRESS NOTES
Medicare Wellness Visit  Health Risk Assessment           Health Risk Assessment / Review of Systems     Constitutional: Any fevers or night sweats? No     Eyes:  Vision problems   No     Hearing Do you feel you have hearing loss?  No     Cardiovascular: Any chest pain, fast or irregular heart beat, calf pain with walking?     No           Respiratory:   Any breathing problems or cough?   No     Gastrointestinal: Any stomach or stool problems?   No      Genitourinary: Do you have difficulty controlling urination?   No     Muscles and Joints: Any joint stiffness or soreness?    YES -reports having left shoulder discomfort, has already been seen 10/2/22 and 10/5/2022.    Skin: Any concerning lesions or moles?   No     Nervous System: Any loss of strength or feeling, numbness or tingling, shaking, dizziness, or headache?  No     Mental Health: Any depression, anxiety or problems sleeping?    No     Cognition: Do you have any problems with your memory?  No            Medical Care     What other specialists or organizations are involved in your medical care?  none  Patient Care Team       Relationship Specialty Notifications Start End    Adi Schrader MD PCP - General Student in organized health care education/training program  7/30/21     Phone: [phone] Fax: [phone] [address] [facility] [address]    Preethi Monterroso    8/19/14     [facility] Managed Care Coordinator [phone]    Adi Schrader MD Assigned PCP   8/1/21     Phone: [phone] Fax: [phone] [address] [facility] [address]          Have you been to the ER or overnight in the hospital in the last year?   YES, 10/2/22 for left shoulder pain         Social History / Home Safety       Marital Status:  Who lives in your household? self    Do you feel threatened or controlled by a partner, ex-partner or anyone in your life? No     Has anyone hurt you physically, for example by pushing,  hitting, slapping or kicking you   or forcing you to have sex? No          Does your home have any of the following safety concerns; loose rugs in the hallway,  bathrooms with no grab bars by the tub or toilet, stairs with no handrails or poorly lit areas?  No     Do you need help with dressing yourself, bathing, eating or getting around your home?  No     Do you need help with the phone, transportation, shopping, preparing meals, housework, laundry, medications or managing money? YES -transportation, shopping and laundry, son and daughter in law helps with above activities.      Risk Behaviors and Healthy Habits     History   Smoking Status     Never   Smokeless Tobacco     Never     How many servings of fruits and vegetables do you eat a day? 4 servings a day    Exercise: 4-5 days/week goes to adult day center, participates in stretching.     Do you frequently drive without a seatbelt? No     Do you use tobacco?  No     Do you use any other drugs? No         Do you use alcohol?No      Frailty Assessment            Have you lost 10 or more pounds unintentionally in the previous year? No     How difficult is walking from one room to the other on the same level? No     Is it difficult to lift or carry something as heavy as 10 pounds?moderately      Compared with most (men/women) your age, would you say  that you are more active, less active, or about the same? same        FALL RISK ASSESSMENT 10/17/2022 5/23/2022 8/2/2021 9/22/2017   Fallen 2 or more times in the past year? No No No No   Any fall with injury in the past year? No No No No   Timed Up and Go Test/Seconds (13.5 is a fall risk; contact physician) 13 - 12 -         Advance Directives:   Discussed with patient and family as appropriate. Has patient  completed advance directives and/or a living will? yes      Silvana Orta RN

## 2022-10-17 NOTE — LETTER
October 25, 2022      See You  [address]  [place] [address]        Dear ,    We are writing to inform you of your test results.    Your FIT (screening for colon cancer) was negative.     We'll repeat this again in 1 year.     Resulted Orders   Lipid Profile   Result Value Ref Range    Cholesterol 232 (H) <200 mg/dL    Triglycerides 218 (H) <150 mg/dL    Direct Measure HDL 77 >=50 mg/dL    LDL Cholesterol Calculated 111 (H) <=100 mg/dL    Non HDL Cholesterol 155 (H) <130 mg/dL    Narrative    Cholesterol  Desirable:  <200 mg/dL    Triglycerides  Normal:  Less than 150 mg/dL  Borderline High:  150-199 mg/dL  High:  200-499 mg/dL  Very High:  Greater than or equal to 500 mg/dL    Direct Measure HDL  Female:  Greater than or equal to 50 mg/dL   Male:  Greater than or equal to 40 mg/dL    LDL Cholesterol  Desirable:  <100mg/dL  Above Desirable:  100-129 mg/dL   Borderline High:  130-159 mg/dL   High:  160-189 mg/dL   Very High:  >= 190 mg/dL    Non HDL Cholesterol  Desirable:  130 mg/dL  Above Desirable:  130-159 mg/dL  Borderline High:  160-189 mg/dL  High:  190-219 mg/dL  Very High:  Greater than or equal to 220 mg/dL   Hemoglobin A1c   Result Value Ref Range    Hemoglobin A1C 6.1 (H) 0.0 - 5.6 %      Comment:      Normal <5.7%   Prediabetes 5.7-6.4%    Diabetes 6.5% or higher     Note: Adopted from ADA consensus guidelines.   Fecal colorectal cancer screen FIT   Result Value Ref Range    Occult Blood Screen FIT Negative Negative       If you have any questions or concerns, please call the clinic at the number listed above.       Sincerely,      Adi Schrader MD

## 2022-10-18 PROCEDURE — 82274 ASSAY TEST FOR BLOOD FECAL: CPT | Performed by: STUDENT IN AN ORGANIZED HEALTH CARE EDUCATION/TRAINING PROGRAM

## 2022-10-21 LAB — HEMOCCULT STL QL IA: NEGATIVE

## 2022-10-27 DIAGNOSIS — E44.1 MILD MALNUTRITION (H): ICD-10-CM

## 2022-10-27 DIAGNOSIS — G89.29 CHRONIC LEFT SHOULDER PAIN: ICD-10-CM

## 2022-10-27 DIAGNOSIS — M25.512 CHRONIC LEFT SHOULDER PAIN: ICD-10-CM

## 2022-11-07 DIAGNOSIS — G89.29 CHRONIC LEFT SHOULDER PAIN: ICD-10-CM

## 2022-11-07 DIAGNOSIS — E44.1 MILD MALNUTRITION (H): ICD-10-CM

## 2022-11-07 DIAGNOSIS — M25.512 CHRONIC LEFT SHOULDER PAIN: ICD-10-CM

## 2022-11-07 RX ORDER — LACTOSE-REDUCED FOOD
LIQUID (ML) ORAL
Qty: 15168 ML | Refills: 1 | Status: SHIPPED | OUTPATIENT
Start: 2022-11-07 | End: 2023-11-30

## 2022-11-07 RX ORDER — FEEDER CONTAINER WITH PUMP SET
1 EACH MISCELLANEOUS
Qty: 7110 ML | Refills: 1 | Status: SHIPPED | OUTPATIENT
Start: 2022-11-07 | End: 2022-11-28

## 2022-11-07 RX ORDER — FEEDER CONTAINER WITH PUMP SET
1 EACH MISCELLANEOUS
Qty: 7110 ML | Refills: 1 | Status: SHIPPED | OUTPATIENT
Start: 2022-11-07 | End: 2022-11-07

## 2022-11-28 DIAGNOSIS — E44.1 MILD MALNUTRITION (H): ICD-10-CM

## 2022-11-28 RX ORDER — FEEDER CONTAINER WITH PUMP SET
1 EACH MISCELLANEOUS
Qty: 21330 ML | Refills: 3 | Status: SHIPPED | OUTPATIENT
Start: 2022-11-28 | End: 2023-12-29

## 2022-12-20 NOTE — PATIENT INSTRUCTIONS
Important Takeaway Points From This Visit:    I refilled your medications    Please get a dexa scan    For Osteoporosis     your medication from your pharmacy. Take 70 mg by mouth of Alendronate every 7 days for prevention of bone fracture.    Take your medication with water and stay sitting or standing upright at least 30 minutes after taking it.    Continue walking and other weight bearing exercises    Wear good shoes with treading to prevent falls.    F/u as needed if you have any side effects from the medication    Call the clinic if you have problems picking up your medications        As always, please call with any questions or concerns. I look forward to seeing you again soon!    Take care,  Dr. Maya    Your current medication list is printed. Please keep this with you - it is helpful to bring this current list to any other medical appointments. It can also be helpful if you ever go to the emergency room or hospital.    If you had lab testing today we will call you with the results. The phone number we will call with your results is # [phone] (home) . If this is not the best number please call our clinic and change the number.    If you need any refills, please call your pharmacy and they will contact us.    If you have any further concerns or wish to schedule another appointment, please call our office at [phone] during normal business hours (8-5, M-F).    If you have urgent medical questions that cannot wait, you may call [phone] at any time of day.    If you have a medical emergency, please call 271.    Thank you for coming to [facility].        Referral for ( TEST )  :      Dexa Scan   LOCATION/PLACE/Provider :    [facility]  DATE & TIME :     1- at 2:00  PHONE :     [phone]  FAX :     [phone]  Appointment made by clinic staff/:    Iliana    
Normal

## 2023-03-15 NOTE — PROGRESS NOTES
Assessment & Plan     Chronic left shoulder pain  Chronic should pain. No numbness. Has full ROM. Pain has improved with Voltaren gel.     Generalized muscle weakness  Unclear generalized weakness vs fatigue over the past few months. Reports this got worse after an IV at a local pharmacy over the summer.   - CBC with platelets; Future  - Basic metabolic panel; Future  - CBC with platelets  - Basic metabolic panel    Chronic bilateral low back pain without sciatica  CT lumbar spine on 10/12/2021 notable for significant spinal stenosis of multiple levels. Discussed with patient about starting Gabapentin, but decline with concerns of drowsiness. Given the degeneration noted on CT, don't feel an injection would be helpful given the broad area of disease. No red flag symptoms but given the significant impairment caused by her pain did discuss an MRI, however, did discuss that likely interventions would be surgical, she emphatically declines surgery and is not interested in MRI at this time. Trial of OTC lidocaine patch, following up in a month.           Return in about 4 weeks (around 4/13/2023) for Follow up.    Duong Lozoya MD  [facility]    Subjective   See is a 77 year old accompanied by her , presenting for the following health issues:  Back Pain (Lower back pain and thigh. Feel weight on shoulder , and weakness in legs )      HPI   History of chronic left shoulder pain and age-related osteoarthritis. Requesting evaluation for generalized weakness and chronic bilateral lower back pain, no midline pain that radiates down her legs to ankle bilaterally (does not extend to feet) for the past year. Pain worsened during the summer of 2022, no falls during that time. Unable to describe the pain, does not feel burning or sharp pain. No numbness. Described the weakness as a combination of feeling tired and decreased strength. She was seen at [facility] pharmacy (July 2022) and was  given some IV solution to help, but the weakness/fatigue persisted. Used the Voltaren gel on her back and provided no relief. She tried PT last year, but stopped because of worsening pain with exercise. She is taking some medications that she bought at [employer].     Review of Systems   All review of systems negative, except otherwise noted      Objective    /68   Pulse 65   Resp 18   Wt 45.4 kg (100 lb)   SpO2 99%   BMI 23.82 kg/m    Body mass index is 23.82 kg/m .  Physical Exam   GENERAL: healthy, alert, appears uncomfortable   RESP: speaking in full sentences, normal work of breathing   CV: extremities well perfused  ABDOMEN: soft, nontender  MS: no gross musculoskeletal defects noted, no edema  PSYCH: mentation appears normal, affect normal/bright      BACK:   ROM: flexion -full /extension -full /lateral rotation- full /side bend- full   Bony tenderness: None   Paraspinal tenderness: to bilateral lumbar   Sensation: intact in b/l lower extremities.   Strength: 4/5 w/ dorsiflexion/ plantarflexion/ inversion/ eversion/ knee flexion/ extension.   Maneuvers: Neg straight leg raise b/l.         ----- Service Performed and Documented by Resident or Fellow ------

## 2023-03-16 ENCOUNTER — OFFICE VISIT (OUTPATIENT)
Dept: FAMILY MEDICINE | Facility: CLINIC | Age: 78
End: 2023-03-16
Payer: COMMERCIAL

## 2023-03-16 VITALS
DIASTOLIC BLOOD PRESSURE: 68 MMHG | WEIGHT: 100 LBS | SYSTOLIC BLOOD PRESSURE: 127 MMHG | OXYGEN SATURATION: 99 % | HEART RATE: 65 BPM | BODY MASS INDEX: 23.82 KG/M2 | RESPIRATION RATE: 18 BRPM

## 2023-03-16 DIAGNOSIS — M62.81 GENERALIZED MUSCLE WEAKNESS: Primary | ICD-10-CM

## 2023-03-16 DIAGNOSIS — M54.50 CHRONIC BILATERAL LOW BACK PAIN WITHOUT SCIATICA: ICD-10-CM

## 2023-03-16 DIAGNOSIS — G89.29 CHRONIC LEFT SHOULDER PAIN: ICD-10-CM

## 2023-03-16 DIAGNOSIS — G89.29 CHRONIC BILATERAL LOW BACK PAIN WITHOUT SCIATICA: ICD-10-CM

## 2023-03-16 DIAGNOSIS — M25.512 CHRONIC LEFT SHOULDER PAIN: ICD-10-CM

## 2023-03-16 LAB
ANION GAP SERPL CALCULATED.3IONS-SCNC: 11 MMOL/L (ref 7–15)
BUN SERPL-MCNC: 21.4 MG/DL (ref 8–23)
CALCIUM SERPL-MCNC: 9.1 MG/DL (ref 8.8–10.2)
CHLORIDE SERPL-SCNC: 110 MMOL/L (ref 98–107)
CREAT SERPL-MCNC: 0.7 MG/DL (ref 0.51–0.95)
DEPRECATED HCO3 PLAS-SCNC: 22 MMOL/L (ref 22–29)
ERYTHROCYTE [DISTWIDTH] IN BLOOD BY AUTOMATED COUNT: 13.5 % (ref 10–15)
GFR SERPL CREATININE-BSD FRML MDRD: 90 ML/MIN/1.73M2
GLUCOSE SERPL-MCNC: 107 MG/DL (ref 70–99)
HCT VFR BLD AUTO: 42.1 % (ref 35–47)
HGB BLD-MCNC: 13.5 G/DL (ref 11.7–15.7)
MCH RBC QN AUTO: 30.8 PG (ref 26.5–33)
MCHC RBC AUTO-ENTMCNC: 32.1 G/DL (ref 31.5–36.5)
MCV RBC AUTO: 96 FL (ref 78–100)
PLATELET # BLD AUTO: 164 10E3/UL (ref 150–450)
POTASSIUM SERPL-SCNC: 3.8 MMOL/L (ref 3.4–5.3)
RBC # BLD AUTO: 4.38 10E6/UL (ref 3.8–5.2)
SODIUM SERPL-SCNC: 143 MMOL/L (ref 136–145)
WBC # BLD AUTO: 8.7 10E3/UL (ref 4–11)

## 2023-03-16 PROCEDURE — 36415 COLL VENOUS BLD VENIPUNCTURE: CPT | Performed by: MASSAGE THERAPIST

## 2023-03-16 PROCEDURE — 80048 BASIC METABOLIC PNL TOTAL CA: CPT | Performed by: MASSAGE THERAPIST

## 2023-03-16 PROCEDURE — 99214 OFFICE O/P EST MOD 30 MIN: CPT | Mod: GC | Performed by: MASSAGE THERAPIST

## 2023-03-16 PROCEDURE — 85027 COMPLETE CBC AUTOMATED: CPT | Performed by: MASSAGE THERAPIST

## 2023-03-16 ASSESSMENT — PATIENT HEALTH QUESTIONNAIRE - PHQ9: SUM OF ALL RESPONSES TO PHQ QUESTIONS 1-9: 17

## 2023-03-16 NOTE — LETTER
March 21, 2023      See You  [address]  [place] [address]        Dear ,    We are writing to inform you of your test results.    Your blood tests last week were normal. Your electrolytes, kidney function and blood counts all look good.  I hope you are feeling better and we will see you in clinic next month.     Resulted Orders   CBC with platelets   Result Value Ref Range    WBC Count 8.7 4.0 - 11.0 10e3/uL    RBC Count 4.38 3.80 - 5.20 10e6/uL    Hemoglobin 13.5 11.7 - 15.7 g/dL    Hematocrit 42.1 35.0 - 47.0 %    MCV 96 78 - 100 fL    MCH 30.8 26.5 - 33.0 pg    MCHC 32.1 31.5 - 36.5 g/dL    RDW 13.5 10.0 - 15.0 %    Platelet Count 164 150 - 450 10e3/uL   Basic metabolic panel   Result Value Ref Range    Sodium 143 136 - 145 mmol/L    Potassium 3.8 3.4 - 5.3 mmol/L    Chloride 110 (H) 98 - 107 mmol/L    Carbon Dioxide (CO2) 22 22 - 29 mmol/L    Anion Gap 11 7 - 15 mmol/L    Urea Nitrogen 21.4 8.0 - 23.0 mg/dL    Creatinine 0.70 0.51 - 0.95 mg/dL    Calcium 9.1 8.8 - 10.2 mg/dL    Glucose 107 (H) 70 - 99 mg/dL    GFR Estimate 90 >60 mL/min/1.73m2      Comment:      eGFR calculated using 2021 CKD-EPI equation.       If you have any questions or concerns, please call the clinic at the number listed above.       Sincerely,      Jenelle Florez MD

## 2023-04-11 ENCOUNTER — TRANSFERRED RECORDS (OUTPATIENT)
Dept: HEALTH INFORMATION MANAGEMENT | Facility: CLINIC | Age: 78
End: 2023-04-11
Payer: COMMERCIAL

## 2023-04-18 ENCOUNTER — OFFICE VISIT (OUTPATIENT)
Dept: FAMILY MEDICINE | Facility: CLINIC | Age: 78
End: 2023-04-18
Payer: COMMERCIAL

## 2023-04-18 VITALS
HEIGHT: 55 IN | BODY MASS INDEX: 22.68 KG/M2 | TEMPERATURE: 98.9 F | SYSTOLIC BLOOD PRESSURE: 149 MMHG | RESPIRATION RATE: 20 BRPM | DIASTOLIC BLOOD PRESSURE: 74 MMHG | WEIGHT: 98 LBS | OXYGEN SATURATION: 98 %

## 2023-04-18 DIAGNOSIS — M48.062 SPINAL STENOSIS OF LUMBAR REGION WITH NEUROGENIC CLAUDICATION: Primary | ICD-10-CM

## 2023-04-18 PROCEDURE — 99214 OFFICE O/P EST MOD 30 MIN: CPT | Mod: GC | Performed by: STUDENT IN AN ORGANIZED HEALTH CARE EDUCATION/TRAINING PROGRAM

## 2023-04-18 RX ORDER — ACETAMINOPHEN 500 MG
500-1000 TABLET ORAL EVERY 6 HOURS PRN
Qty: 120 TABLET | Refills: 3 | Status: SHIPPED | OUTPATIENT
Start: 2023-04-18 | End: 2023-06-12

## 2023-04-18 RX ORDER — ALENDRONATE SODIUM 70 MG/1
1 TABLET ORAL
COMMUNITY
End: 2023-06-12

## 2023-04-18 RX ORDER — KETOROLAC TROMETHAMINE 5 MG/ML
SOLUTION OPHTHALMIC
COMMUNITY
Start: 2023-02-15 | End: 2023-12-29

## 2023-04-18 RX ORDER — OLOPATADINE HYDROCHLORIDE 1 MG/ML
SOLUTION/ DROPS OPHTHALMIC
COMMUNITY
Start: 2022-06-09 | End: 2023-12-29

## 2023-04-18 RX ORDER — POLYVINYL ALCOHOL 14 MG/ML
SOLUTION/ DROPS OPHTHALMIC
COMMUNITY
Start: 2023-02-15 | End: 2023-12-29

## 2023-04-18 RX ORDER — ACETAMINOPHEN 325 MG/1
TABLET ORAL
COMMUNITY
Start: 2022-10-17 | End: 2023-04-18

## 2023-04-18 ASSESSMENT — PATIENT HEALTH QUESTIONNAIRE - PHQ9: SUM OF ALL RESPONSES TO PHQ QUESTIONS 1-9: 8
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
## 2023-06-12 ENCOUNTER — OFFICE VISIT (OUTPATIENT)
Dept: FAMILY MEDICINE | Facility: CLINIC | Age: 78
End: 2023-06-12
Payer: COMMERCIAL

## 2023-06-12 VITALS
RESPIRATION RATE: 18 BRPM | HEART RATE: 61 BPM | WEIGHT: 97.8 LBS | SYSTOLIC BLOOD PRESSURE: 157 MMHG | DIASTOLIC BLOOD PRESSURE: 72 MMHG | BODY MASS INDEX: 22.63 KG/M2 | HEIGHT: 55 IN | OXYGEN SATURATION: 97 %

## 2023-06-12 DIAGNOSIS — M48.062 SPINAL STENOSIS OF LUMBAR REGION WITH NEUROGENIC CLAUDICATION: Primary | ICD-10-CM

## 2023-06-12 DIAGNOSIS — M81.0 AGE-RELATED OSTEOPOROSIS WITHOUT CURRENT PATHOLOGICAL FRACTURE: ICD-10-CM

## 2023-06-12 PROBLEM — H81.12 BENIGN PAROXYSMAL POSITIONAL VERTIGO OF LEFT EAR: Status: RESOLVED | Noted: 2019-08-05 | Resolved: 2023-06-12

## 2023-06-12 PROBLEM — N20.0 URIC ACID NEPHROLITHIASIS: Status: RESOLVED | Noted: 2019-08-21 | Resolved: 2023-06-12

## 2023-06-12 PROCEDURE — 99214 OFFICE O/P EST MOD 30 MIN: CPT | Mod: GC | Performed by: STUDENT IN AN ORGANIZED HEALTH CARE EDUCATION/TRAINING PROGRAM

## 2023-06-12 RX ORDER — AMITRIPTYLINE HYDROCHLORIDE 10 MG/1
10 TABLET ORAL AT BEDTIME
Qty: 30 TABLET | Refills: 0 | Status: SHIPPED | OUTPATIENT
Start: 2023-06-12 | End: 2023-12-29

## 2023-06-12 ASSESSMENT — PATIENT HEALTH QUESTIONNAIRE - PHQ9: SUM OF ALL RESPONSES TO PHQ QUESTIONS 1-9: 11

## 2023-08-18 ENCOUNTER — OFFICE VISIT (OUTPATIENT)
Dept: FAMILY MEDICINE | Facility: CLINIC | Age: 78
End: 2023-08-18
Payer: COMMERCIAL

## 2023-08-18 VITALS
BODY MASS INDEX: 22.45 KG/M2 | HEIGHT: 55 IN | SYSTOLIC BLOOD PRESSURE: 143 MMHG | WEIGHT: 97 LBS | RESPIRATION RATE: 20 BRPM | OXYGEN SATURATION: 96 % | DIASTOLIC BLOOD PRESSURE: 78 MMHG | HEART RATE: 72 BPM

## 2023-08-18 DIAGNOSIS — G89.29 CHRONIC BILATERAL LOW BACK PAIN WITHOUT SCIATICA: Primary | ICD-10-CM

## 2023-08-18 DIAGNOSIS — Z01.818 PREOP GENERAL PHYSICAL EXAM: ICD-10-CM

## 2023-08-18 DIAGNOSIS — M54.50 CHRONIC BILATERAL LOW BACK PAIN WITHOUT SCIATICA: Primary | ICD-10-CM

## 2023-08-18 DIAGNOSIS — Z01.818 VISIT FOR PRE-OPERATIVE EXAMINATION: ICD-10-CM

## 2023-08-18 PROCEDURE — 99214 OFFICE O/P EST MOD 30 MIN: CPT | Mod: GC

## 2023-08-18 RX ORDER — PREDNISOLONE ACETATE 10 MG/ML
SUSPENSION/ DROPS OPHTHALMIC
COMMUNITY
Start: 2023-08-16 | End: 2023-12-29

## 2023-08-18 RX ORDER — DICLOFENAC SODIUM 1 MG/ML
SOLUTION/ DROPS OPHTHALMIC
COMMUNITY
Start: 2023-08-16 | End: 2023-12-29 | Stop reason: SINTOL

## 2023-08-18 RX ORDER — MOXIFLOXACIN 5 MG/ML
SOLUTION/ DROPS OPHTHALMIC
COMMUNITY
Start: 2023-08-16 | End: 2023-12-29

## 2023-08-18 ASSESSMENT — PATIENT HEALTH QUESTIONNAIRE - PHQ9: SUM OF ALL RESPONSES TO PHQ QUESTIONS 1-9: 4

## 2023-08-18 NOTE — PATIENT INSTRUCTIONS
For informational purposes only. Not to replace the advice of your health care provider. Copyright   2003,  [facility] [place] [facility]. All rights reserved. Clinically reviewed by Trupti Bahena MD. [employer] [id number] - REV .  Preparing for Your Surgery  Getting started  A nurse will call you to review your health history and instructions. They will give you an arrival time based on your scheduled surgery time. Please be ready to share:  Your doctor's clinic name and phone number  Your medical, surgical, and anesthesia history  A list of allergies and sensitivities  A list of medicines, including herbal treatments and over-the-counter drugs  Whether the patient has a legal guardian (ask how to send us the papers in advance)  Please tell us if you're pregnant--or if there's any chance you might be pregnant. Some surgeries may injure a fetus (unborn baby), so they require a pregnancy test. Surgeries that are safe for a fetus don't always need a test, and you can choose whether to have one.   If you have a child who's having surgery, please ask for a copy of Preparing for Your Child's Surgery.    Preparing for surgery  Within 10 to 30 days of surgery: Have a pre-op exam (sometimes called an H&P, or History and Physical). This can be done at a clinic or pre-operative center.  If you're having a , you may not need this exam. Talk to your care team.  At your pre-op exam, talk to your care team about all medicines you take. If you need to stop any medicines before surgery, ask when to start taking them again.  We do this for your safety. Many medicines can make you bleed too much during surgery. Some change how well surgery (anesthesia) drugs work.  Call your insurance company to let them know you're having surgery. (If you don't have insurance, call [phone].)  Call your clinic if there's any change in your health. This includes signs of a cold or flu (sore throat, runny nose, cough, rash, fever). It also  includes a scrape or scratch near the surgery site.  If you have questions on the day of surgery, call your hospital or surgery center.  Eating and drinking guidelines  For your safety: Unless your surgeon tells you otherwise, follow the guidelines below.  Eat and drink as usual until 8 hours before you arrive for surgery. After that, no food or milk.  Drink clear liquids until 2 hours before you arrive. These are liquids you can see through, like water, Gatorade, and Propel Water. They also include plain black coffee and tea (no cream or milk), candy, and breath mints. You can spit out gum when you arrive.  If you drink alcohol: Stop drinking it the night before surgery.  If your care team tells you to take medicine on the morning of surgery, it's okay to take it with a sip of water.  Preventing infection  Shower or bathe the night before and morning of your surgery. Follow the instructions your clinic gave you. (If no instructions, use regular soap.)  Don't shave or clip hair near your surgery site. We'll remove the hair if needed.  Don't smoke or vape the morning of surgery. You may chew nicotine gum up to 2 hours before surgery. A nicotine patch is okay.  Note: Some surgeries require you to completely quit smoking and nicotine. Check with your surgeon.  Your care team will make every effort to keep you safe from infection. We will:  Clean our hands often with soap and water (or an alcohol-based hand rub).  Clean the skin at your surgery site with a special soap that kills germs.  Give you a special gown to keep you warm. (Cold raises the risk of infection.)  Wear special hair covers, masks, gowns and gloves during surgery.  Give antibiotic medicine, if prescribed. Not all surgeries need antibiotics.  What to bring on the day of surgery  Photo ID and insurance card  Copy of your health care directive, if you have one  Glasses and hearing aids (bring cases)  You can't wear contacts during surgery  Inhaler and eye  drops, if you use them (tell us about these when you arrive)  CPAP machine or breathing device, if you use them  A few personal items, if spending the night  If you have . . .  A pacemaker, ICD (cardiac defibrillator) or other implant: Bring the ID card.  An implanted stimulator: Bring the remote control.  A legal guardian: Bring a copy of the certified (court-stamped) guardianship papers.  Please remove any jewelry, including body piercings. Leave jewelry and other valuables at home.  If you're going home the day of surgery  You must have a responsible adult drive you home. They should stay with you overnight as well.  If you don't have someone to stay with you, and you aren't safe to go home alone, we may keep you overnight. Insurance often won't pay for this.  After surgery  If it's hard to control your pain or you need more pain medicine, please call your surgeon's office.  Questions?   If you have any questions for your care team, list them here: _________________________________________________________________________________________________________________________________________________________________________ ____________________________________ ____________________________________ ____________________________________    How to Take Your Medication Before Surgery  - Take all of your medications before surgery as usual
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
## 2023-08-18 NOTE — PROGRESS NOTES
Preceptor Attestation:  Patient's case reviewed and discussed with the resident, Rubin Pelayo MD, and I personally evaluated the patient. I agree with written assessment and plan of care.    Supervising Physician:  Rayna Hodge MD   [facility]

## 2023-11-30 DIAGNOSIS — E44.1 MILD MALNUTRITION (H): ICD-10-CM

## 2023-11-30 NOTE — TELEPHONE ENCOUNTER
Message to physician: none    Date of last visit: 8/18/2023    Date of next visit if scheduled: none    Potassium   Date Value Ref Range Status   03/16/2023 3.8 3.4 - 5.3 mmol/L Final   10/21/2021 3.5 3.5 - 5.0 mmol/L Final   02/16/2018 3.6 3.4 - 5.3 mmol/L Final     Creatinine   Date Value Ref Range Status   03/16/2023 0.70 0.51 - 0.95 mg/dL Final   02/16/2018 0.6 0.6 - 1.3 mg/dL Final     GFR Estimate   Date Value Ref Range Status   03/16/2023 90 >60 mL/min/1.73m2 Final     Comment:     eGFR calculated using 2021 CKD-EPI equation.   11/04/2018 >60 >60 mL/min/1.73m2 Final   07/08/2014 >60 >60 mL/min/1.73m2 Final       BP Readings from Last 3 Encounters:   08/18/23 (!) 143/78   06/12/23 (!) 157/72   04/18/23 (!) 149/74       Hemoglobin A1C   Date Value Ref Range Status   10/17/2022 6.1 (H) 0.0 - 5.6 % Final     Comment:     Normal <5.7%   Prediabetes 5.7-6.4%    Diabetes 6.5% or higher     Note: Adopted from ADA consensus guidelines.   08/23/2018 5.9 (H) 4.1 - 5.7 % Final       Please complete refill and CLOSE ENCOUNTER.  Closing the encounter signifies the refill is complete.

## 2023-12-01 RX ORDER — LACTOSE-REDUCED FOOD
LIQUID (ML) ORAL
Qty: 15168 ML | Refills: 11 | Status: SHIPPED | OUTPATIENT
Start: 2023-12-01 | End: 2023-12-29

## 2023-12-29 ENCOUNTER — OFFICE VISIT (OUTPATIENT)
Dept: FAMILY MEDICINE | Facility: CLINIC | Age: 78
End: 2023-12-29
Payer: COMMERCIAL

## 2023-12-29 VITALS
OXYGEN SATURATION: 97 % | WEIGHT: 89 LBS | SYSTOLIC BLOOD PRESSURE: 137 MMHG | DIASTOLIC BLOOD PRESSURE: 79 MMHG | HEIGHT: 55 IN | HEART RATE: 69 BPM | TEMPERATURE: 98.2 F | BODY MASS INDEX: 20.6 KG/M2 | RESPIRATION RATE: 18 BRPM

## 2023-12-29 DIAGNOSIS — E44.1 MILD MALNUTRITION (H): ICD-10-CM

## 2023-12-29 DIAGNOSIS — M54.41 CHRONIC BILATERAL LOW BACK PAIN WITH BILATERAL SCIATICA: Primary | ICD-10-CM

## 2023-12-29 DIAGNOSIS — G89.29 CHRONIC BILATERAL LOW BACK PAIN WITH BILATERAL SCIATICA: Primary | ICD-10-CM

## 2023-12-29 DIAGNOSIS — M54.42 CHRONIC BILATERAL LOW BACK PAIN WITH BILATERAL SCIATICA: Primary | ICD-10-CM

## 2023-12-29 PROCEDURE — 99214 OFFICE O/P EST MOD 30 MIN: CPT | Mod: GC

## 2023-12-29 RX ORDER — NORTRIPTYLINE HCL 10 MG
10 CAPSULE ORAL AT BEDTIME
Qty: 30 CAPSULE | Refills: 1 | Status: SHIPPED | OUTPATIENT
Start: 2023-12-29 | End: 2024-01-26

## 2023-12-29 RX ORDER — NORTRIPTYLINE HCL 10 MG
10 CAPSULE ORAL AT BEDTIME
Qty: 30 CAPSULE | Refills: 1 | Status: SHIPPED | OUTPATIENT
Start: 2023-12-29 | End: 2023-12-29

## 2023-12-29 RX ORDER — LACTOSE-REDUCED FOOD
LIQUID (ML) ORAL
Qty: 15168 ML | Refills: 11 | Status: SHIPPED | OUTPATIENT
Start: 2023-12-29 | End: 2024-01-17

## 2023-12-29 ASSESSMENT — PATIENT HEALTH QUESTIONNAIRE - PHQ9
SUM OF ALL RESPONSES TO PHQ QUESTIONS 1-9: 7
SUM OF ALL RESPONSES TO PHQ QUESTIONS 1-9: 7
10. IF YOU CHECKED OFF ANY PROBLEMS, HOW DIFFICULT HAVE THESE PROBLEMS MADE IT FOR YOU TO DO YOUR WORK, TAKE CARE OF THINGS AT HOME, OR GET ALONG WITH OTHER PEOPLE: SOMEWHAT DIFFICULT

## 2023-12-29 NOTE — PROGRESS NOTES
Assessment & Plan     Chronic bilateral low back pain with bilateral sciatica  Pt is a 78 year old woman w/ known C spine and L spine stenosis w/ claudication. Most recent imaging is MRI C spine 10/14/22 showing severe spinal canal narrowing at C4-5, C5-6. Moderate spinal canal narrowing C6-7, mild narrowing C3-4. Severe R and moderate L neuroforaminal narrowing at C5-6. Severe bilateral neuroforaminal narrowing at C6-7. 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 Continues to have severe low back px w/ sciatic symptoms radiating down bilateral LEs. Today pt reports low back px much more bothersome than bilateral shoulder px. Has previously been seen at spine clinic as recently as 2022. Has been on multiple medications for this (voltarin gel, gabapentin, duloxetine, and amitriptyline) though did not tolerate side effects of drowsiness and confusion. Has previously refused PT, spine injections, acupuncture, however w/ severity of symptoms now dramatically worsening quality of life would be open to redicussing these modalities. Will start w/ trial of Nortriptyline today as well as resend referral to spine clinic for likely plan of L spine epidural injection trial.  - Spine  Referral  - nortriptyline (PAMELOR) 10 MG capsule     Mild malnutrition (H24)  Pt w/ hx of malnutrition, has been using Ensure shakes the last several years w/ improvement in BMI and appetite. Will send refill script for this today.  - Nutritional Supplements (ENSURE) LIQD  Dispense: 06174 mL; Refill: 11    Return in about 2 weeks (around 1/12/2024) for Follow up, with me.    Rubin Pelayo MD  [facility]    Subjective   See is a 78 year old, presenting for the following health  issues:  Back Pain and Knee Pain    HPI     Back and shoulder px  - Per my note from :  Pt with hx of low back pain and thigh pain. Currently takes Tylenol only to manage this pain. She tried PT, voltarin gel, gabapentin, duloxetine, and amitriptyline-- the latter 3 caused her intolerable adverse effects of drowsiness/confusion. She was seen by spine clinic for this problem in 2022 and has refused spine injection, PT, and acupuncture. Plan for her to follow-up in clinic ~2 weeks after her surgery to address her back/thigh pain. At that visit can recommend returning to PT, spine clinic, lumbar TESI, scheduled Tylenol   - Today:   - Vertigo symptoms w/ standing for the last three months following cataract surgery  - Same low back px and bilateral shoulder px. Okay at rest, much worse standing or moving in any way  - Associated burning px in all fingers of bilateral hands for last month  - No fevers, chills, night px,   - Has seen a chiropractor, this is not helpful   - Feels strongly she would not want surgery  - MRI C spine 10/14/22 showing severe spinal canal narrowing at C4-5, C5-6. Moderate spinal canal narrowing C6-7, mild narrowing C3-4. Severe R and moderate L neuroforaminal narrowing at C5-6. Severe bilateral neuroforaminal narrowing at C6-7.  - CT lumbar spine 10/12/22 showin.  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 Please correlate with radiographs prior to any surgical or interventional procedures.  2.  Severe spinal canal narrowing at L4-L5.  3.  Mild to moderate spinal canal narrowing and moderate narrowing of the right lateral recess at L2-L3.  4.  Mild spinal canal narrowing and mild narrowing of the right lateral recess at L1-L2.  5.  Mild spinal canal narrowing and mild narrowing of the lateral recesses at L3-L4.  6.  Severe right neuroforaminal narrowing at L2-L3.  7.  Severe left and moderate to severe right neuroforaminal narrowing at L4-L5.  8.  Moderate to severe  "right and mild to moderate left neuroforaminal narrowing at L1-L2.  9.  Moderate left and mild to moderate right neuroforaminal narrowing at L3-L4.  10.  Approximately 35 degrees of levocurvature from T12 to L3.     Review of Systems   Constitutional, HEENT, cardiovascular, pulmonary, gi and gu systems are negative, except as otherwise noted.      Objective    /79 (BP Location: Right arm, Patient Position: Sitting, Cuff Size: Adult Regular)   Pulse 69   Temp 98.2  F (36.8  C) (Oral)   Resp 18   Ht 1.368 m (4' 5.86\")   Wt 40.4 kg (89 lb)   SpO2 97%   BMI 21.57 kg/m    Body mass index is 21.57 kg/m .  Physical Exam   GENERAL: healthy, alert and no distress  NECK: no adenopathy, no asymmetry, masses, or scars and thyroid normal to palpation  RESP: lungs clear to auscultation - no rales, rhonchi or wheezes  CV: regular rate and rhythm, normal S1 S2, no S3 or S4, no murmur, click or rub, no peripheral edema and peripheral pulses strong  ABDOMEN: soft, nontender, no hepatosplenomegaly, no masses and bowel sounds normal  MS:   Back: Mild to moderate ttp over spinous processes throughout the L spine. No ttp or signifiant muscular tension to Paraspinous musculature at this level.  Bilateral shoulder w/ full passive and active ROM. No effusion, bony tenderness to palpation. Negative spurling test bilaterally.    ----- Service Performed and Documented by Resident or Fellow ------        Answers submitted by the patient for this visit:  Patient Health Questionnaire (Submitted on 12/29/2023)  If you checked off any problems, how difficult have these problems made it for you to do your work, take care of things at home, or get along with other people?: Somewhat difficult  PHQ9 TOTAL SCORE: 7    "

## 2024-01-17 ENCOUNTER — OFFICE VISIT (OUTPATIENT)
Dept: FAMILY MEDICINE | Facility: CLINIC | Age: 79
End: 2024-01-17
Payer: COMMERCIAL

## 2024-01-17 VITALS
SYSTOLIC BLOOD PRESSURE: 122 MMHG | DIASTOLIC BLOOD PRESSURE: 67 MMHG | BODY MASS INDEX: 20.83 KG/M2 | HEIGHT: 55 IN | TEMPERATURE: 98.1 F | WEIGHT: 90 LBS | OXYGEN SATURATION: 97 % | HEART RATE: 73 BPM

## 2024-01-17 DIAGNOSIS — G89.29 CHRONIC BILATERAL LOW BACK PAIN WITH BILATERAL SCIATICA: ICD-10-CM

## 2024-01-17 DIAGNOSIS — M48.062 SPINAL STENOSIS OF LUMBAR REGION WITH NEUROGENIC CLAUDICATION: Primary | ICD-10-CM

## 2024-01-17 DIAGNOSIS — M54.42 CHRONIC BILATERAL LOW BACK PAIN WITH BILATERAL SCIATICA: ICD-10-CM

## 2024-01-17 DIAGNOSIS — E44.1 MILD MALNUTRITION (H): ICD-10-CM

## 2024-01-17 DIAGNOSIS — M54.41 CHRONIC BILATERAL LOW BACK PAIN WITH BILATERAL SCIATICA: ICD-10-CM

## 2024-01-17 PROCEDURE — 99214 OFFICE O/P EST MOD 30 MIN: CPT | Mod: GC

## 2024-01-17 RX ORDER — LACTOSE-REDUCED FOOD
LIQUID (ML) ORAL
Qty: 15168 ML | Refills: 11 | Status: SHIPPED | OUTPATIENT
Start: 2024-01-17

## 2024-01-17 RX ORDER — SULINDAC 200 MG/1
200 TABLET ORAL 2 TIMES DAILY
Qty: 30 TABLET | Refills: 1 | Status: SHIPPED | OUTPATIENT
Start: 2024-01-17 | End: 2024-03-04

## 2024-01-17 NOTE — PROGRESS NOTES
Assessment & Plan     Spinal stenosis of lumbar region with neurogenic claudication  See has longstanding hx of lumbar and cervical spinal pathology and symptoms. Has had repeated imaging showing a number of findings, see below. Has been seen by spine clinic as recently as 2022. Has refused PT, spinal injections, acupuncture more recently, unclear why. Did not tolerate trials of many medications (voltaren gel, gabapentin, duloxetine, amitriptyline) d/t side effects, namely drowsiness. Sent script for Nortypyline for these concerns recently, pt has since self discontinued due to drowsiness while taking this. Does report that years ago was receiving script for oral NSAID Sulindac which I have ordered today to trial. Will have her come back to check in on this, ideally will have co visit w/ Psych to discuss motivation/fear behind trial of these other modalities.   - sulindac (CLINORIL) 200 MG tablet  Dispense: 30 tablet; Refill: 1    Mild malnutrition (H24)  Prolonged hospitalization this fall for a number of concerns including malnutrition and weight loss, was actually FT dependent for some time. Since discharge weight has been stable, no weight loss, but difficulty gaining weight d/t appetite concerns. Sending script for Ensure protein supplements today.   - Nutritional Supplements (ENSURE) LIQD  Dispense: 92323 mL; Refill: 11    No follow-ups on file.    Subjective   See is a 78 year old, presenting for the following health issues:  RECHECK (Back pain, needs something for pain) and Recheck Medication (Nortriptyline making her dizzy  )    HPI     77 yo F, pmh C/L spine stenosis w/ cluadication,    Back pain  - Saw myself 12/29 for chronic low back px w/ symptoms of sciatica   - Did see spine clinic as recently as 2022, has tried many medications including voltaren gel, gabapentin, duloxetine, amitryptyline. Did not  tolerate side effects of medication, namely drowsiness and confusion.  - Has previously refused PT,  "spinal injections, acupuncture, more open to these at last months visit and so referred to spine clinic once again and started on Nortriptyline  - Most recent imaging was MRI of the C spine 10/14/22 showing multiple findings and L spine CT  MRI C spine 10/14/22: severe spinal canal narrowing at C4-5, C5-6. Moderate spinal canal narrowing C6-7, mild narrowing C3-4. Severe R and moderate L neuroforaminal narrowing at C5-6. Severe bilateral neuroforaminal narrowing at C6-7  CT L spine 10/12/21: multiple findings, most notably transitional L5 which is partially sacralized on the right, severe narrowing at L4-5, Severe right neuroforaminal narrowing at L2-L3, Severe left and moderate to severe right neuroforaminal narrowing at L4-L5  Today:   - Since starting nortriptyline has had headaches and dizziness, self discontinued this  - When she first came to [place] from [place] was given a prescription for a med that did help her back px, unsure what that is   - On chart review it appears this was PO Sulindac, an NSAID medication.  - Does not want to go to the spine clinic \"because nothing has helped and they will not be able to\"        Objective    /67   Pulse 73   Temp 98.1  F (36.7  C)   Ht 1.37 m (4' 5.94\")   Wt 40.8 kg (90 lb)   SpO2 97%   BMI 21.75 kg/m    Body mass index is 21.75 kg/m .    Review of Systems  Constitutional, HEENT, cardiovascular, pulmonary, gi and gu systems are negative, except as otherwise noted.    Physical Exam   GENERAL: alert, tearful, NAD.  NECK: no adenopathy, no asymmetry, masses, or scars  RESP: lungs clear to auscultation - no rales, rhonchi or wheezes  CV: regular rate and rhythm, normal S1 S2, no S3 or S4, no murmur, click or rub, no peripheral edema  ABDOMEN: soft, nontender, no hepatosplenomegaly, no masses and bowel sounds normal  MS: no gross musculoskeletal defects noted, no edema          Signed Electronically by: Rubin Pelayo MD  "

## 2024-01-28 NOTE — PROGRESS NOTES
Preceptor Attestation:  Patient's case reviewed and discussed with Rubin Pelayo MD resident and I evaluated the patient. I agree with written assessment and plan of care.  Supervising Physician:  Kevin Burgos MD, MD [facility]

## 2024-02-01 ENCOUNTER — TELEPHONE (OUTPATIENT)
Dept: FAMILY MEDICINE | Facility: CLINIC | Age: 79
End: 2024-02-01

## 2024-02-01 NOTE — TELEPHONE ENCOUNTER
Called patient with ong  to adjust next appointment. Please notify writer upon patient's return call. Writer will then call with [facility]  to further discuss. Appointment needs to be with PCP and co visit with [facility] but writer to provide explanation to patient. Nitin ROBERTS
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
## 2024-02-13 ENCOUNTER — OFFICE VISIT (OUTPATIENT)
Dept: FAMILY MEDICINE | Facility: CLINIC | Age: 79
End: 2024-02-13
Payer: COMMERCIAL

## 2024-02-13 ENCOUNTER — OFFICE VISIT (OUTPATIENT)
Dept: PSYCHOLOGY | Facility: CLINIC | Age: 79
End: 2024-02-13
Payer: COMMERCIAL

## 2024-02-13 VITALS
TEMPERATURE: 98.2 F | OXYGEN SATURATION: 98 % | DIASTOLIC BLOOD PRESSURE: 70 MMHG | HEART RATE: 77 BPM | SYSTOLIC BLOOD PRESSURE: 126 MMHG

## 2024-02-13 DIAGNOSIS — M48.062 SPINAL STENOSIS OF LUMBAR REGION WITH NEUROGENIC CLAUDICATION: Primary | ICD-10-CM

## 2024-02-13 DIAGNOSIS — M79.2 NEUROPATHIC PAIN: ICD-10-CM

## 2024-02-13 DIAGNOSIS — F45.1 SOMATIC SYMPTOM DISORDER, PERSISTENT, SEVERE: Primary | ICD-10-CM

## 2024-02-13 PROCEDURE — 99214 OFFICE O/P EST MOD 30 MIN: CPT | Mod: GC

## 2024-02-13 PROCEDURE — 99207 PR NO BILLABLE SERVICE THIS VISIT: CPT | Performed by: MARRIAGE & FAMILY THERAPIST

## 2024-02-13 RX ORDER — GABAPENTIN 100 MG/1
100 CAPSULE ORAL AT BEDTIME
Qty: 45 CAPSULE | Refills: 0 | Status: SHIPPED | OUTPATIENT
Start: 2024-02-13 | End: 2024-03-04

## 2024-02-13 ASSESSMENT — PATIENT HEALTH QUESTIONNAIRE - PHQ9
SUM OF ALL RESPONSES TO PHQ QUESTIONS 1-9: 5
SUM OF ALL RESPONSES TO PHQ QUESTIONS 1-9: 5
10. IF YOU CHECKED OFF ANY PROBLEMS, HOW DIFFICULT HAVE THESE PROBLEMS MADE IT FOR YOU TO DO YOUR WORK, TAKE CARE OF THINGS AT HOME, OR GET ALONG WITH OTHER PEOPLE: SOMEWHAT DIFFICULT

## 2024-02-13 NOTE — PATIENT INSTRUCTIONS
Start gabapentin 100 mg every evening.   After one week, you could increase this to 100 mg twice daily (morning and evening).

## 2024-02-13 NOTE — PROGRESS NOTES
Assessment & Plan     Spinal stenosis of lumbar region with neurogenic claudication  Neuropathic pain   See has longstanding hx of lumbar and cervical spinal pathology and symptoms. Has had repeated imaging showing a number of findings, see below. Has been seen by spine clinic as recently as 2022. Has refused PT, spinal injections, acupuncture more recently, unclear why. Did not tolerate trials of many medications (voltaren gel, gabapentin, duloxetine, amitriptyline, nortriptyline) d/t side effects, namely drowsiness. Does report that years ago was receiving script for oral NSAID Sulindac which Dr Pelayo ordered at last visit, patient states she has not been taking it. Co-visit today with behavioral health team to discuss motivation/fear behind trial of these other modalities - see details in note by Dr. Britton. Today, patient states that the burning pain from her low back into her bilateral lower extremities is most bothersome. Will trial very low dose gabapentin to hopefully help with nerve pain without causing much drowsiness.    - Start gabapentin 100 mg every evening   - Could increased gabapentin to 100 mg BID after one week if tolerating well   - Return to clinic in 2 weeks for follow-up   - Recommended referring back to spine clinic for possible epidural steroid injections. Patient declined.     Return in about 2 weeks (around 2/27/2024) for back pain .    Subjective   See is a 78 year old, presenting for the following health issues:  RECHECK (Low back pain)      2/13/2024    11:13 AM   Additional Questions   Roomed by andi worley     HPI     Met with  provider AVERY.     Patient is looking for a medication to help with the burning sensation that starts in her low back and shoots down both her legs into her posterior and anterior calves.     Has had 50 years of chronic back pain since son was born.   Burning sensation though started about 3 years ago. This limits what she can do at home.   Family  is helping her at home, wants to be more independent.     Has been on gabapentin in the past but stopped due to drowsiness. She was taking other medications at the same time then.     Is able to sleep well at night.     Does not want surgery.   Does not want to go to spine clinic again.   Does not want spinal injections.         Objective    /70   Pulse 77   Temp 98.2  F (36.8  C)   SpO2 98%   There is no height or weight on file to calculate BMI.  Physical Exam   GENERAL: healthy, alert and no distress  RESP: speaking in full sentences, normal work of breathing   CV: extremities well perfused  ABDOMEN: soft, nontender  MS: no gross musculoskeletal defects noted, no edema  PSYCH: mentation appears normal, affect normal/bright         Signed Electronically by: Apurva Colon MD    Answers submitted by the patient for this visit:  Patient Health Questionnaire (Submitted on 2/13/2024)  If you checked off any problems, how difficult have these problems made it for you to do your work, take care of things at home, or get along with other people?: Somewhat difficult  PHQ9 TOTAL SCORE: 5

## 2024-03-04 ENCOUNTER — OFFICE VISIT (OUTPATIENT)
Dept: FAMILY MEDICINE | Facility: CLINIC | Age: 79
End: 2024-03-04
Payer: COMMERCIAL

## 2024-03-04 VITALS
SYSTOLIC BLOOD PRESSURE: 146 MMHG | OXYGEN SATURATION: 98 % | DIASTOLIC BLOOD PRESSURE: 65 MMHG | HEART RATE: 65 BPM | TEMPERATURE: 98 F

## 2024-03-04 DIAGNOSIS — M48.062 SPINAL STENOSIS OF LUMBAR REGION WITH NEUROGENIC CLAUDICATION: Primary | ICD-10-CM

## 2024-03-04 DIAGNOSIS — M79.2 NEUROPATHIC PAIN: ICD-10-CM

## 2024-03-04 PROCEDURE — 99214 OFFICE O/P EST MOD 30 MIN: CPT | Mod: GC

## 2024-03-04 RX ORDER — SULINDAC 200 MG/1
200 TABLET ORAL 2 TIMES DAILY
Qty: 30 TABLET | Refills: 1 | Status: SHIPPED | OUTPATIENT
Start: 2024-03-04 | End: 2024-05-29

## 2024-03-04 NOTE — PROGRESS NOTES
Assessment & Plan     Spinal stenosis of lumbar region with neurogenic claudication  Neuropathic pain  See has longstanding hx of lumbar and cervical spinal pathology and symptoms. Has had repeated imaging showing a number of findings, see below. Has been seen by spine clinic as recently as 2022. Has refused PT, spinal injections, acupuncture more recently, unclear why. Did not tolerate trials of many medications (voltaren gel, gabapentin, duloxetine, amitriptyline, nortriptyline) d/t side effects, namely drowsiness. Had a visit with behavioral health team to discuss motivation/fear behind trial of these other modalities - see details in note by Dr. Britton. At last visit, retried gabapentin 100 mg at bedtime - patient tried it for a few days but stopped it due to severe drowsiness that lasted the whole next day after taking it. Dr Pelayo had ordered sulindac (NSAID) for patient at a prior visit, which she thinks was helpful to her in the distant past, but she never got it from her pharmacy. Will resend today.   - Start sulindac BID   - sulindac (CLINORIL) 200 MG tablet  Dispense: 30 tablet; Refill: 1  - Return to clinic in 2 weeks for follow-up with PCP  - Recommended referring back to spine clinic for possible epidural steroid injections. Patient declined.     Follow-up in 2 weeks with PCP Dr. Pelayo.     Subjective   See is a 78 year old, presenting for the following health issues:  Back Pain (And leg pain) and Recheck Medication (Gabapentin doesn't make her feel good, it makes it so she can't get up at night to use bath room)        3/4/2024    10:52 AM   Additional Questions   Roomed by clay becker   Accompanied by self     HPI     Per my last note 2/13/24:  Spinal stenosis of lumbar region with neurogenic claudication  Neuropathic pain   See has longstanding hx of lumbar and cervical spinal pathology and symptoms. Has had repeated imaging showing a number of findings, see below. Has been  seen by spine clinic as recently as 2022. Has refused PT, spinal injections, acupuncture more recently, unclear why. Did not tolerate trials of many medications (voltaren gel, gabapentin, duloxetine, amitriptyline, nortriptyline) d/t side effects, namely drowsiness. Does report that years ago was receiving script for oral NSAID Sulindac which Dr Pelayo ordered at last visit, patient states she has not been taking it. Co-visit today with behavioral health team to discuss motivation/fear behind trial of these other modalities - see details in note by Dr. Britton. Today, patient states that the burning pain from her low back into her bilateral lower extremities is most bothersome. Will trial very low dose gabapentin to hopefully help with nerve pain without causing much drowsiness.    - Start gabapentin 100 mg every evening   - Could increased gabapentin to 100 mg BID after one week if tolerating well   - Return to clinic in 2 weeks for follow-up   - Recommended referring back to spine clinic for possible epidural steroid injections. Patient declined.     Since last visit:   Tried the gabapentin 100 mg at bedtime for a week.  Did not like the medicine.   Made her feel extremely tired. Couldn't get up to use the restroom at night. Didn't want to get out of bed all day the next day.   Pain has been the same, still very painful low back and leg pain.   Feels the gabapentin was much worse than the other medicines.     Never picked up sulindac, an NSAID prescribed by Dr Pelayo.   Patient had thought this was helpful to her several years ago.     Still not at all interested in going to spine clinic or getting an epidural injection.   Declines PT.         Objective    BP (!) 146/65 (BP Location: Right arm, Patient Position: Sitting, Cuff Size: Adult Regular)   Pulse 65   Temp 98  F (36.7  C)   SpO2 98%   There is no height or weight on file to calculate BMI.  Physical Exam   GENERAL: healthy, alert and no  distress  RESP: speaking in full sentences, normal work of breathing   CV: extremities well perfused  ABDOMEN: soft, nontender  MS: no gross musculoskeletal defects noted, no edema  PSYCH: mentation appears normal, affect normal/bright           Signed Electronically by: Apurva Colon MD

## 2024-03-19 NOTE — PROGRESS NOTES
Assessment & Plan     Chronic bilateral low back pain with left-sided sciatica  Age-related osteoporosis without current pathological fracture  Longstanding hx of lumbar and cervical spinal pathology at many levels, see below. Has been seen by spine clinic as recently as 2022. Has refused PT, spinal injections, acupuncture repeatedly over the last several months despite ongoing severe back px limiting ADLs. Has failed numerous medication trials d/t side effects, primarily drowsiness (voltaren gel, gabapentin, duloxetine, amitriptyline, nortriptyline, sulindac). Does have hx of osteoporosis s/p bisphosphonate therapy from [id number]. We have not actually gotten XRs of the spine in some time and so did order this today to assess for insufficiency fracture that may be acutely managed w/ intranasal calcitonin. This was negative for obvious fracture. I will also order DEXA scan as well to see if BMD has improved or at least maintained. If worsened will likely need more aggressive osteoporosis treatment w/ high dose yearly bisphosphonate infusion vs Prolia infusions. Patient has been taking anti inflammatory medicine she has been receiving from [facility] for her pain, which she states has actually been quite helpful. On reviewing packaging it appears this is Piroxicam, and so I am ordering this today so that she may get it from a pharmacy w/ insurance paying as well as knowing there is no contamination. Will check BMP to make sure her kidneys can handle this, also discussed proper use of NSAIDs.   - Basic metabolic panel  - piroxicam (FELDENE) 20 MG capsule  Dispense: 90 capsule; Refill: 3  - XR Lumbar Spine 2-3 Views*  - Basic metabolic panel  - DX Bone Density    Gastroesophageal reflux disease with esophagitis without hemorrhage  Likely worsened w/ recent NSAID use, see above. Encouraged to take proper doses of piroxicam only on full stomach. She does not drink, does not smoke. Could consider H pylori testing if  this is worsening and pt agreeable.  - famotidine (PEPCID) 20 MG tablet  Dispense: 90 tablet; Refill: 3    Moderate mixed hyperlipidemia not requiring statin therapy   - Lipid panel reflex to direct LDL Non-fasting    Return in about 4 weeks (around 4/17/2024) for Follow up.    Subjective   See is a 78 year old, presenting for the following health issues:  Back Pain and Recheck Medication (Meds not helping just makes her sleepy)    HPI     77 yo F w/ pmh of lumbar spinal stenosis w/ neuro claudication, prediabetes, HLD, GERD, osteoporosis, OA    Follow up back pain  - Longstanding hx of lumbar and cervical spinal pathology and symptoms, see below. Has been seen by spine clinic as recently as 2022. Has refused PT, spinal injections, acupuncture repeatedly  - 2/13 saw Dr Colon and [facility], given script for gabapentin for nerve px.   - Seen again 3/4 at which time reported stopped gabapentin d/t severe drowsiness. Resent script for Sulindac as this was helpful years ago  - Last DEXA in 2019 showing T score of -2.8 in L femoral neck and -2.7 in R femoral neck. Was on Fosamax 2019 up until last year when self discontinued  - Last MR of C spine 10/14/22, last CT L spine 10/12/22, see below.   Today:  - Sulindac pills not terribly effective for px but do make her very drowsy  - iAmplifyksone pills from [facility] [employer] have been helpful. Taking one pill per day.  - Still having lots of OA pain in back, hands as well as low back and upper back/neck  - Having GERD symptoms, worse on an empty stomach.     MR cervical spine 10/2022:  1.  Severe spinal canal narrowing at C4-C5 and C5-C6.  2.  Moderate to severe spinal canal narrowing at C6-C7.  3.  Mild spinal canal narrowing at C3-C4.   4.  Severe right and moderate severe left neuroforaminal narrowing at C5-C6.  5.  Severe bilateral neuroforaminal narrowing at C6-C7 to  CT lumbar spine 10/2022:   1.  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 Please correlate with  "radiographs prior to any surgical or interventional procedures.  2.  Severe spinal canal narrowing at L4-L5.  3.  Mild to moderate spinal canal narrowing and moderate narrowing of the right lateral recess at L2-L3.  4.  Mild spinal canal narrowing and mild narrowing of the right lateral recess at L1-L2.  5.  Mild spinal canal narrowing and mild narrowing of the lateral recesses at L3-L4.  6.  Severe right neuroforaminal narrowing at L2-L3.  7.  Severe left and moderate to severe right neuroforaminal narrowing at L4-L5.  8.  Moderate to severe right and mild to moderate left neuroforaminal narrowing at L1-L2.  9.  Moderate left and mild to moderate right neuroforaminal narrowing at L3-L4.  10.  Approximately 35 degrees of levocurvature from T12 to L3.       Review of Systems  Constitutional, HEENT, cardiovascular, pulmonary, gi and gu systems are negative, except as otherwise noted.      Objective    BP (!) 159/80 (BP Location: Left arm, Patient Position: Sitting, Cuff Size: Child)   Pulse 54   Temp 98.1  F (36.7  C)   Ht 1.374 m (4' 6.09\")   Wt 40.4 kg (89 lb)   SpO2 98%   BMI 21.38 kg/m    Body mass index is 21.38 kg/m .  Physical Exam   GENERAL: Sitting in chair, poor eye contact, tearful  RESP: lungs clear to auscultation - no rales, rhonchi or wheezes  CV: regular rate and rhythm, normal S1 S2, no S3 or S4, no murmur, click or rub, no peripheral edema  ABDOMEN: soft, nontender, no hepatosplenomegaly, no masses and bowel sounds normal  MS: no gross musculoskeletal defects noted, no edema  Psych: A&O x3, mood is appropriate. Linear thought processes intact.    Results for orders placed or performed in visit on 03/20/24   XR Lumbar Spine 2-3 Views*     Status: None    Narrative    EXAM: XR LUMBAR SPINE 2/3 VIEWS  LOCATION: [facility]  DATE: 3/20/2024    INDICATION: 79 yo F w  known osteopenia, lumbar and cervical spinal stenosis, several months of severe low back px w  " radiculopathy  COMPARISON: CT lumbar spine 10/12/2021      Impression    IMPRESSION: There are 5 lumbar type vertebral bodies. Redemonstration of levoconvex curvature of the lumbar spine. Unchanged severe right-sided loss of disc height at L1-L2 and L2-L3. Unchanged severe loss of disc height at L4-L5. Unchanged moderate to   severe facet arthropathy in the lower lumbar spine. No acute fracture.   Lipid panel reflex to direct LDL Non-fasting     Status: Abnormal   Result Value Ref Range    Cholesterol 217 (H) <200 mg/dL    Triglycerides 172 (H) <150 mg/dL    Direct Measure HDL 72 >=50 mg/dL    LDL Cholesterol Calculated 111 (H) <=100 mg/dL    Non HDL Cholesterol 145 (H) <130 mg/dL    Patient Fasting > 8hrs? Unknown     Narrative    Cholesterol  Desirable:  <200 mg/dL    Triglycerides  Normal:  Less than 150 mg/dL  Borderline High:  150-199 mg/dL  High:  200-499 mg/dL  Very High:  Greater than or equal to 500 mg/dL    Direct Measure HDL  Female:  Greater than or equal to 50 mg/dL   Male:  Greater than or equal to 40 mg/dL    LDL Cholesterol  Desirable:  <100mg/dL  Above Desirable:  100-129 mg/dL   Borderline High:  130-159 mg/dL   High:  160-189 mg/dL   Very High:  >= 190 mg/dL    Non HDL Cholesterol  Desirable:  130 mg/dL  Above Desirable:  130-159 mg/dL  Borderline High:  160-189 mg/dL  High:  190-219 mg/dL  Very High:  Greater than or equal to 220 mg/dL   Basic metabolic panel     Status: Abnormal   Result Value Ref Range    Sodium 140 135 - 145 mmol/L    Potassium 4.6 3.4 - 5.3 mmol/L    Chloride 105 98 - 107 mmol/L    Carbon Dioxide (CO2) 22 22 - 29 mmol/L    Anion Gap 13 7 - 15 mmol/L    Urea Nitrogen 32.8 (H) 8.0 - 23.0 mg/dL    Creatinine 0.97 (H) 0.51 - 0.95 mg/dL    GFR Estimate 60 (L) >60 mL/min/1.73m2    Calcium 9.5 8.8 - 10.2 mg/dL    Glucose 117 (H) 70 - 99 mg/dL           Signed Electronically by: Rubin Pelayo MD

## 2024-03-20 ENCOUNTER — OFFICE VISIT (OUTPATIENT)
Dept: FAMILY MEDICINE | Facility: CLINIC | Age: 79
End: 2024-03-20
Payer: COMMERCIAL

## 2024-03-20 ENCOUNTER — ANCILLARY PROCEDURE (OUTPATIENT)
Dept: GENERAL RADIOLOGY | Facility: CLINIC | Age: 79
End: 2024-03-20
Attending: FAMILY MEDICINE
Payer: COMMERCIAL

## 2024-03-20 VITALS
BODY MASS INDEX: 20.6 KG/M2 | OXYGEN SATURATION: 98 % | TEMPERATURE: 98.1 F | HEART RATE: 54 BPM | WEIGHT: 89 LBS | HEIGHT: 55 IN | DIASTOLIC BLOOD PRESSURE: 80 MMHG | SYSTOLIC BLOOD PRESSURE: 159 MMHG

## 2024-03-20 DIAGNOSIS — G89.29 CHRONIC BILATERAL LOW BACK PAIN WITH LEFT-SIDED SCIATICA: Primary | ICD-10-CM

## 2024-03-20 DIAGNOSIS — M54.42 CHRONIC BILATERAL LOW BACK PAIN WITH LEFT-SIDED SCIATICA: Primary | ICD-10-CM

## 2024-03-20 DIAGNOSIS — M81.0 AGE-RELATED OSTEOPOROSIS WITHOUT CURRENT PATHOLOGICAL FRACTURE: ICD-10-CM

## 2024-03-20 DIAGNOSIS — K21.00 GASTROESOPHAGEAL REFLUX DISEASE WITH ESOPHAGITIS WITHOUT HEMORRHAGE: ICD-10-CM

## 2024-03-20 DIAGNOSIS — E78.2 MODERATE MIXED HYPERLIPIDEMIA NOT REQUIRING STATIN THERAPY: ICD-10-CM

## 2024-03-20 LAB
ANION GAP SERPL CALCULATED.3IONS-SCNC: 13 MMOL/L (ref 7–15)
BUN SERPL-MCNC: 32.8 MG/DL (ref 8–23)
CALCIUM SERPL-MCNC: 9.5 MG/DL (ref 8.8–10.2)
CHLORIDE SERPL-SCNC: 105 MMOL/L (ref 98–107)
CHOLEST SERPL-MCNC: 217 MG/DL
CREAT SERPL-MCNC: 0.97 MG/DL (ref 0.51–0.95)
DEPRECATED HCO3 PLAS-SCNC: 22 MMOL/L (ref 22–29)
EGFRCR SERPLBLD CKD-EPI 2021: 60 ML/MIN/1.73M2
FASTING STATUS PATIENT QL REPORTED: ABNORMAL
GLUCOSE SERPL-MCNC: 117 MG/DL (ref 70–99)
HDLC SERPL-MCNC: 72 MG/DL
LDLC SERPL CALC-MCNC: 111 MG/DL
NONHDLC SERPL-MCNC: 145 MG/DL
POTASSIUM SERPL-SCNC: 4.6 MMOL/L (ref 3.4–5.3)
SODIUM SERPL-SCNC: 140 MMOL/L (ref 135–145)
TRIGL SERPL-MCNC: 172 MG/DL

## 2024-03-20 PROCEDURE — 80061 LIPID PANEL: CPT

## 2024-03-20 PROCEDURE — 72100 X-RAY EXAM L-S SPINE 2/3 VWS: CPT | Mod: TC | Performed by: RADIOLOGY

## 2024-03-20 PROCEDURE — 36415 COLL VENOUS BLD VENIPUNCTURE: CPT

## 2024-03-20 PROCEDURE — 99214 OFFICE O/P EST MOD 30 MIN: CPT | Mod: GC

## 2024-03-20 PROCEDURE — 80048 BASIC METABOLIC PNL TOTAL CA: CPT

## 2024-03-20 RX ORDER — RESPIRATORY SYNCYTIAL VIRUS VACCINE 120MCG/0.5
0.5 KIT INTRAMUSCULAR ONCE
Qty: 1 EACH | Refills: 0 | Status: CANCELLED | OUTPATIENT
Start: 2024-03-20 | End: 2024-03-20

## 2024-03-20 RX ORDER — FAMOTIDINE 20 MG/1
20 TABLET, FILM COATED ORAL 2 TIMES DAILY
Qty: 90 TABLET | Refills: 3 | Status: SHIPPED | OUTPATIENT
Start: 2024-03-20

## 2024-03-21 DIAGNOSIS — E44.1 MILD MALNUTRITION (H): ICD-10-CM

## 2024-03-21 RX ORDER — LACTOSE-REDUCED FOOD
LIQUID (ML) ORAL
Qty: 15168 ML | Status: CANCELLED | OUTPATIENT
Start: 2024-03-21

## 2024-03-21 NOTE — TELEPHONE ENCOUNTER
Message to physician: Rx refill request    Date of last visit: 3/20/2024    Date of next visit if scheduled: 4/11/24    Potassium   Date Value Ref Range Status   03/20/2024 4.6 3.4 - 5.3 mmol/L Final   10/21/2021 3.5 3.5 - 5.0 mmol/L Final   02/16/2018 3.6 3.4 - 5.3 mmol/L Final     Creatinine   Date Value Ref Range Status   03/20/2024 0.97 (H) 0.51 - 0.95 mg/dL Final   02/16/2018 0.6 0.6 - 1.3 mg/dL Final     GFR Estimate   Date Value Ref Range Status   03/20/2024 60 (L) >60 mL/min/1.73m2 Final   11/04/2018 >60 >60 mL/min/1.73m2 Final   07/08/2014 >60 >60 mL/min/1.73m2 Final       BP Readings from Last 3 Encounters:   03/20/24 (!) 159/80   03/04/24 (!) 146/65   02/13/24 126/70       Hemoglobin A1C   Date Value Ref Range Status   10/17/2022 6.1 (H) 0.0 - 5.6 % Final     Comment:     Normal <5.7%   Prediabetes 5.7-6.4%    Diabetes 6.5% or higher     Note: Adopted from ADA consensus guidelines.   08/23/2018 5.9 (H) 4.1 - 5.7 % Final       Please complete refill and CLOSE ENCOUNTER.  Closing the encounter signifies the refill is complete.

## 2024-03-26 NOTE — PROGRESS NOTES
Preceptor Attestation:   Patient seen, evaluated and discussed with the resident. I have verified the content of the note, which accurately reflects my assessment of the patient and the plan of care.    Supervising Physician:She Briseno MD    [facility]

## 2024-03-27 ENCOUNTER — APPOINTMENT (OUTPATIENT)
Dept: INTERPRETER SERVICES | Facility: CLINIC | Age: 79
End: 2024-03-27
Payer: COMMERCIAL

## 2024-04-17 ENCOUNTER — OFFICE VISIT (OUTPATIENT)
Dept: FAMILY MEDICINE | Facility: CLINIC | Age: 79
End: 2024-04-17
Payer: COMMERCIAL

## 2024-04-17 VITALS
BODY MASS INDEX: 21.69 KG/M2 | RESPIRATION RATE: 16 BRPM | HEART RATE: 60 BPM | TEMPERATURE: 97.9 F | HEIGHT: 55 IN | SYSTOLIC BLOOD PRESSURE: 166 MMHG | DIASTOLIC BLOOD PRESSURE: 77 MMHG | OXYGEN SATURATION: 98 % | WEIGHT: 93.75 LBS

## 2024-04-17 DIAGNOSIS — M54.42 CHRONIC BILATERAL LOW BACK PAIN WITH LEFT-SIDED SCIATICA: Primary | ICD-10-CM

## 2024-04-17 DIAGNOSIS — G89.29 CHRONIC BILATERAL LOW BACK PAIN WITH LEFT-SIDED SCIATICA: Primary | ICD-10-CM

## 2024-04-17 DIAGNOSIS — Z51.81 ENCOUNTER FOR THERAPEUTIC DRUG MONITORING: ICD-10-CM

## 2024-04-17 PROCEDURE — 80048 BASIC METABOLIC PNL TOTAL CA: CPT

## 2024-04-17 PROCEDURE — 36415 COLL VENOUS BLD VENIPUNCTURE: CPT

## 2024-04-17 PROCEDURE — 99213 OFFICE O/P EST LOW 20 MIN: CPT | Mod: GC

## 2024-04-17 RX ORDER — RESPIRATORY SYNCYTIAL VIRUS VACCINE 120MCG/0.5
0.5 KIT INTRAMUSCULAR ONCE
Qty: 1 EACH | Refills: 0 | Status: CANCELLED | OUTPATIENT
Start: 2024-04-17 | End: 2024-04-17

## 2024-04-17 NOTE — LETTER
April 25, 2024      See You  [address]  [place] [address]      Dear See,     Your kidney function and electrolytes are normal, which is good news.  There was evidence of a little bit increased amount of acid in your blood.  I am not exactly sure why this is the case, but I would like to recheck some blood work in about a week to make sure this has improved. You were otherwise feeling well, so I am less concerned about this lab abnormality.     We will plan to discuss the new results once they are done.       Resulted Orders   Basic metabolic panel   Result Value Ref Range    Sodium 144 135 - 145 mmol/L      Comment:      Reference intervals for this test were updated on 09/26/2023 to more accurately reflect our healthy population. There may be differences in the flagging of prior results with similar values performed with this method. Interpretation of those prior results can be made in the context of the updated reference intervals.     Potassium 3.7 3.4 - 5.3 mmol/L    Chloride 110 (H) 98 - 107 mmol/L    Carbon Dioxide (CO2) 17 (L) 22 - 29 mmol/L    Anion Gap 17 (H) 7 - 15 mmol/L    Urea Nitrogen 27.8 (H) 8.0 - 23.0 mg/dL    Creatinine 0.71 0.51 - 0.95 mg/dL    GFR Estimate 88 >60 mL/min/1.73m2    Calcium 8.9 8.8 - 10.2 mg/dL    Glucose 84 70 - 99 mg/dL       If you have any questions or concerns, please call the clinic at the number listed above.       Sincerely,      Wander Spann MD

## 2024-04-17 NOTE — PROGRESS NOTES
Assessment & Plan     Chronic bilateral low back pain with left-sided sciatica  Ongoing bilateral low back pain.  Historically has refused various treatments including PT, spinal injections, acupuncture.  Has also trialed multiple locations including Voltaren gel, gabapentin, duloxetine, amitriptyline, nortriptyline, discontinued due to side effects.  Recent x-ray without evidence of fracture, largely unchanged from prior.  No red flag signs or symptoms.  Started piroxicam recently, no benefit.  Did find an over-the-counter herbal cream from the [employer] that she has been placing on her back, has been helpful.  We discussed unknown ingredients of the cream, and the unknown effects it may have.  She preferred to continue this for now.    -Discontinue piroxicam  -Continue encourage PT  -Follow-up with PCP in a month      Encounter for therapeutic drug monitoring  Monitoring kidney function with NSAID use.  - Basic metabolic panel        No follow-ups on file.    Subjective   See is a 78 year old, presenting for the following health issues:  Back Pain    HPI   Started using an herbal balm on her low back over the past two weeks.  Received this from the [employer].  - Puts it on two times per day.   - has been helpful   - Has been taking piroxicam, no relief   - Has not scheduled dexa yet  - Wants to discontinue piroxicam  - No changes in bowel or bladder function   - No new weakness in the lower extremities            Per Dr Pelayo's Last note  Chronic bilateral low back pain with left-sided sciatica  Age-related osteoporosis without current pathological fracture  Longstanding hx of lumbar and cervical spinal pathology at many levels, see below. Has been seen by spine clinic as recently as 2022. Has refused PT, spinal injections, acupuncture repeatedly over the last several months despite ongoing severe back px limiting ADLs. Has failed numerous medication trials d/t side effects, primarily drowsiness  "(voltaren gel, gabapentin, duloxetine, amitriptyline, nortriptyline, sulindac). Does have hx of osteoporosis s/p bisphosphonate therapy from 1590-5102. We have not actually gotten XRs of the spine in some time and so did order this today to assess for insufficiency fracture that may be acutely managed w/ intranasal calcitonin. This was negative for obvious fracture. I will also order DEXA scan as well to see if BMD has improved or at least maintained. If worsened will likely need more aggressive osteoporosis treatment w/ high dose yearly bisphosphonate infusion vs Prolia infusions. Patient has been taking anti inflammatory medicine she has been receiving from [facility] for her pain, which she states has actually been quite helpful. On reviewing packaging it appears this is Piroxicam, and so I am ordering this today so that she may get it from a pharmacy w/ insurance paying as well as knowing there is no contamination. Will check BMP to make sure her kidneys can handle this, also discussed proper use of NSAIDs.   - Basic metabolic panel  - piroxicam (FELDENE) 20 MG capsule  Dispense: 90 capsule; Refill: 3  - XR Lumbar Spine 2-3 Views*  - Basic metabolic panel  - DX Bone Density               Objective    BP (!) 166/77   Pulse 60   Temp 97.9  F (36.6  C) (Tympanic)   Resp 16   Ht 1.372 m (4' 6\")   Wt 42.5 kg (93 lb 12 oz)   SpO2 98%   BMI 22.60 kg/m    Body mass index is 22.6 kg/m .  Physical Exam   Constitutional: Conversant, well developed. No acute distress  Eyes: Anicteric sclerae, no lid lag  Respiratory: Normal respiratory effort  Cardiovascular: No peripheral edema  Skin: No rash, lesion, or ulcer  Musculoskeletal: No digital cyanosis, normal gait  Psych: Intact judgment and insight. Alert and oriented x3. Cordial affect           Signed Electronically by: Wander Spann MD    "

## 2024-04-19 LAB
ANION GAP SERPL CALCULATED.3IONS-SCNC: 17 MMOL/L (ref 7–15)
BUN SERPL-MCNC: 27.8 MG/DL (ref 8–23)
CALCIUM SERPL-MCNC: 8.9 MG/DL (ref 8.8–10.2)
CHLORIDE SERPL-SCNC: 110 MMOL/L (ref 98–107)
CREAT SERPL-MCNC: 0.71 MG/DL (ref 0.51–0.95)
DEPRECATED HCO3 PLAS-SCNC: 17 MMOL/L (ref 22–29)
EGFRCR SERPLBLD CKD-EPI 2021: 88 ML/MIN/1.73M2
GLUCOSE SERPL-MCNC: 84 MG/DL (ref 70–99)
POTASSIUM SERPL-SCNC: 3.7 MMOL/L (ref 3.4–5.3)
SODIUM SERPL-SCNC: 144 MMOL/L (ref 135–145)

## 2024-04-22 NOTE — PROGRESS NOTES
Preceptor Attestation:  Patient's case reviewed and discussed with Wander Spann MD resident and I evaluated the patient. I agree with written assessment and plan of care.  Supervising Physician:  Kevin Burgos MD, MD [facility]  [facility]

## 2024-04-25 ENCOUNTER — APPOINTMENT (OUTPATIENT)
Dept: INTERPRETER SERVICES | Facility: CLINIC | Age: 79
End: 2024-04-25
Payer: COMMERCIAL

## 2024-05-01 ENCOUNTER — TRANSFERRED RECORDS (OUTPATIENT)
Dept: HEALTH INFORMATION MANAGEMENT | Facility: CLINIC | Age: 79
End: 2024-05-01
Payer: COMMERCIAL

## 2024-05-29 ENCOUNTER — OFFICE VISIT (OUTPATIENT)
Dept: FAMILY MEDICINE | Facility: CLINIC | Age: 79
End: 2024-05-29
Payer: COMMERCIAL

## 2024-05-29 VITALS
OXYGEN SATURATION: 98 % | HEIGHT: 55 IN | BODY MASS INDEX: 21.29 KG/M2 | RESPIRATION RATE: 16 BRPM | SYSTOLIC BLOOD PRESSURE: 148 MMHG | WEIGHT: 92 LBS | TEMPERATURE: 98 F | HEART RATE: 54 BPM | DIASTOLIC BLOOD PRESSURE: 69 MMHG

## 2024-05-29 DIAGNOSIS — Z51.89 FOLLOW-UP MEDICAL CARE REQUESTED BY PATIENT: Primary | ICD-10-CM

## 2024-05-29 PROCEDURE — 99212 OFFICE O/P EST SF 10 MIN: CPT | Mod: GC

## 2024-05-29 RX ORDER — ALENDRONATE SODIUM 70 MG/1
TABLET ORAL
COMMUNITY
End: 2024-05-29

## 2024-05-29 NOTE — PROGRESS NOTES
"  Assessment & Plan     Follow-up medical care requested by patient  Patient here w/ medical forms for adult  program she attends multiple days per week. Filled this out to the best of my ability in clinic today, noting significant daily medications and relevant medical conditions. Currently only daily PTA med is famotidine for GERD, which she takes each AM at home and does not need help w/ there. Continue to recommend weight bearing exercise as much as tolerated for going back px.     No follow-ups on file.    Subjective   See is a 78 year old, presenting for the following health issues:  Forms ([facility], health summary )    HPI     Form request  - Attends adult  here in the community several days a week  - Does not need help w/ medications there, only taking famotidine regularly and no issues w/ compliance taking this at home    Review of Systems  Constitutional, HEENT, cardiovascular, pulmonary, gi and gu systems are negative, except as otherwise noted.      Objective    BP (!) 148/69   Pulse 54   Temp 98  F (36.7  C) (Oral)   Resp 16   Ht 1.39 m (4' 6.72\")   Wt 41.7 kg (92 lb)   SpO2 98%   BMI 21.60 kg/m    Body mass index is 21.6 kg/m .  Physical Exam   GENERAL: alert and no distress  NECK: no adenopathy, no asymmetry, masses, or scars  RESP: lungs clear to auscultation - no rales, rhonchi or wheezes  CV: regular rate and rhythm, normal S1 S2, no S3 or S4, no murmur, click or rub, no peripheral edema  ABDOMEN: soft, nontender, no hepatosplenomegaly, no masses and bowel sounds normal  MS: no gross musculoskeletal defects noted, no edema          Signed Electronically by: Rubin Pelayo MD    "

## 2024-07-09 NOTE — PROGRESS NOTES
Preventive Care Visit  [facility]  Rubin Pelayo MD, Family Medicine  Jul 10, 2024      Assessment & Plan     Need for vaccination against respiratory syncytial virus  ***    Encounter for Medicare annual wellness exam  ***    AWV- 79 yo F w/ pmh GERD, HLD, pre diabetes, and osteoporosis  Only PTA med famotidine  Care gaps: Zoster, RSV,     {Patient advised of split billing (Optional):063072}    Counseling  Appropriate preventive services were discussed with this patient, including applicable screening as appropriate for fall prevention, nutrition, physical activity, Tobacco-use cessation, weight loss and cognition.  Checklist reviewing preventive services available has been given to the patient.  Reviewed patient's diet, addressing concerns and/or questions.   The patient was instructed to see the dentist every 6 months.   Discussed possible causes of fatigue. Updated plan of care.  Patient reported difficulty with activities of daily living were addressed today.Information on urinary incontinence and treatment options given to patient.       {FOLLOW UP PLANS (Optional) Includes COVID19 Treatment Plan:320895}    No follow-ups on file.    Subjective   See is a 78 year old, presenting for the following:  Wellness Visit and Dizziness (No particular association with positional changes. Noticed dizziness to lightheadedness since October 2023 after cataract removal. Dizziness can occur even while at rest. Symptoms makes walking well difficult, needing to walk slower pace and with caution,this is also due to low back pain, bilateral upper leg discomfort. Denies ear discomfort or fullness. Reports discomfort in neck, no stiffness.)    {(!) Visit Details have not yet been documented.  Please enter Visit Details and then use this list to pull in documentation. (Optional):815036}  {ROOMER if patient is in their first year of Medicare a vision screen is required click here to document the [employer]  screen and then refresh the note to pull in results  :565636}    Health Care Directive  Patient has a Health Care Directive on file  {(AWV REQUIRED) Advanced Care Planning Reviewed:093397}    HPI  ***  {MA/LPN/RN Pre-Provider Visit Orders- hCG/UA/Strep (Optional):679068}  {SUPERLIST (Optional):915463}  {additonal problems for provider to add (Optional):676010}      7/10/2024   General Health   How would you rate your overall physical health? (!) POOR   Feel stress (tense, anxious, or unable to sleep) Not at all            7/10/2024   Nutrition   Diet: Regular (no restrictions)            7/10/2024   Exercise   Days per week of moderate/strenous exercise 5 days   Average minutes spent exercising at this level 60 min            7/10/2024   Social Factors   Frequency of gathering with friends or relatives More than three times a week   Worry food won't last until get money to buy more No   Food not last or not have enough money for food? No   Do you have housing? (Housing is defined as stable permanent housing and does not include staying ouside in a car, in a tent, in an abandoned building, in an overnight shelter, or couch-surfing.) Yes   Are you worried about losing your housing? Yes   Lack of transportation? No   Unable to get utilities (heat,electricity)? No   Want help with housing or utility concern? No      (!) HOUSING CONCERN PRESENT      7/10/2024   Fall Risk   Fallen 2 or more times in the past year? No   Trouble with walking or balance? Yes    Yes   Gait Speed Test (Document in seconds) 7   Gait Speed Test Interpretation Greater than 5.01 seconds - ABNORMAL       Multiple values from one day are sorted in reverse-chronological order          7/10/2024   Activities of Daily Living- Home Safety   Needs help with the following daily activites Laundry    Medication administration    Money management   Safety concerns in the home None of the above       Multiple values from one day are sorted in  reverse-chronological order         7/10/2024   Dental   Dentist two times every year? (!) NO            7/10/2024   Hearing Screening   Hearing concerns? None of the above            7/10/2024   Driving Risk Screening   Patient/family members have concerns about driving No            7/10/2024   General Alertness/Fatigue Screening   Have you been more tired than usual lately? (!) YES            7/10/2024   Urinary Incontinence Screening   Bothered by leaking urine in past 6 months Yes            7/10/2024   TB Screening   Were you born outside of the [place]? Yes            Today's PHQ-2 Score:       7/10/2024     9:33 AM   PHQ-2 ( 1999 Pf[employer])   Q1: Little interest or pleasure in doing things 0   Q2: Feeling down, depressed or hopeless 0   PHQ-2 Score 0    0   Q1: Little interest or pleasure in doing things Not at all   Q2: Feeling down, depressed or hopeless Not at all   PHQ-2 Score 0           7/10/2024   Substance Use   Alcohol more than 3/day or more than 7/wk No   Do you have a current opioid prescription? No   How severe/bad is pain from 1 to 10? 10/10   Do you use any other substances recreationally? No        Social History     Tobacco Use    Smoking status: Never     Passive exposure: Never    Smokeless tobacco: Never   Vaping Use    Vaping status: Never Used   Substance Use Topics    Alcohol use: No    Drug use: No     {Provider  If there are gaps in the social history shown above, please follow the link to update and then refresh the note Link to Social and Substance History :922393}     {Mammogram Decision Support (Optional):241303}    ASCVD Risk   The 10-year ASCVD risk score (Mago THAO, et al., 2019) is: 14%    Values used to calculate the score:      Age: 78 years      Sex: Female      Is Non- : No      Diabetic: No      Tobacco smoker: No      Systolic Blood Pressure: 111 mmHg      Is BP treated: No      HDL Cholesterol: 72 mg/dL      Total Cholesterol: 217  "mg/dL    {Link to Fracture Risk Assessment Tool (Optional):612287}    {Provider  REQUIRED FOR AWV Use the storyboard to review patient history, after sections have been marked as reviewed, refresh note to capture documentation:172100}  {Provider   REQUIRED AWV use this link to review and update sexual activity history  after section has been marked as reviewed, refresh note to capture documentation:103484}  Reviewed and updated as needed this visit by Provider                    {HISTORY OPTIONS (Optional):736100}  Current providers sharing in care for this patient include:  Patient Care Team:  Rubin Pelayo MD as PCP - General (Student in organized health care education/training program)  Preethi Monterroso as   Rubin Pelayo MD as Assigned PCP  Iliana Britton LMFT as Assigned Behavioral Health Provider    The following health maintenance items are reviewed in Epic and correct as of today:  Health Maintenance   Topic Date Due    ZOSTER IMMUNIZATION (1 of 2) Never done    RSV VACCINE (Pregnancy & 60+) (1 - 1-dose 60+ series) Never done    MEDICARE ANNUAL WELLNESS VISIT  10/17/2023    COVID-19 Vaccine (8 - 2023-24 season) 03/07/2024    INFLUENZA VACCINE (1) 09/01/2024    LIPID  03/20/2025    FALL RISK ASSESSMENT  07/10/2025    GLUCOSE  04/17/2027    DTAP/TDAP/TD IMMUNIZATION (3 - Td or Tdap) 07/27/2028    ADVANCE CARE PLANNING  07/10/2029    DEXA  01/31/2034    HEPATITIS C SCREENING  Completed    PHQ-2 (once per calendar year)  Completed    Pneumococcal Vaccine: 65+ Years  Completed    IPV IMMUNIZATION  Aged Out    HPV IMMUNIZATION  Aged Out    MENINGITIS IMMUNIZATION  Aged Out    RSV MONOCLONAL ANTIBODY  Aged Out    MAMMO SCREENING  Discontinued    COLORECTAL CANCER SCREENING  Discontinued       {ROS Picklists (Optional):263335}     Objective    Exam  /63 (BP Location: Right arm, Patient Position: Sitting, Cuff Size: Adult Regular)   Pulse 70   Ht 1.37 m (4' 5.94\")   Wt 40.8 kg (90 " "lb)   SpO2 97%   BMI 21.75 kg/m     Estimated body mass index is 21.75 kg/m  as calculated from the following:    Height as of this encounter: 1.37 m (4' 5.94\").    Weight as of this encounter: 40.8 kg (90 lb).    Physical Exam  {Exam Choices (Optional):690853}        7/10/2024   Mini Cog   Mini-Cog Not Completed (choose reason) Patient declines        {A Mini-Cog total score of 0-2 suggests the possibility of dementia, score of 3-5 suggests no dementia:511449}  {(AWV REQUIRED) Cognitive Review by Provider:371908}           Signed Electronically by: Rubin Pelayo MD  {Email feedback regarding this note to [email or web address]   :196448}  "

## 2024-07-10 ENCOUNTER — OFFICE VISIT (OUTPATIENT)
Dept: FAMILY MEDICINE | Facility: CLINIC | Age: 79
End: 2024-07-10
Payer: COMMERCIAL

## 2024-07-10 VITALS
SYSTOLIC BLOOD PRESSURE: 111 MMHG | HEIGHT: 55 IN | BODY MASS INDEX: 20.83 KG/M2 | OXYGEN SATURATION: 97 % | DIASTOLIC BLOOD PRESSURE: 63 MMHG | HEART RATE: 70 BPM | WEIGHT: 90 LBS

## 2024-07-10 DIAGNOSIS — Z00.00 ENCOUNTER FOR MEDICARE ANNUAL WELLNESS EXAM: Primary | ICD-10-CM

## 2024-07-10 DIAGNOSIS — Z00.00 WELLNESS EXAMINATION: Primary | ICD-10-CM

## 2024-07-10 PROCEDURE — 99207 PR NO BILLABLE SERVICE THIS VISIT: CPT

## 2024-07-10 PROCEDURE — G0439 PPPS, SUBSEQ VISIT: HCPCS | Mod: GC

## 2024-07-10 RX ORDER — RESPIRATORY SYNCYTIAL VIRUS VACCINE 120MCG/0.5
0.5 KIT INTRAMUSCULAR ONCE
Qty: 1 EACH | Refills: 0 | Status: CANCELLED | OUTPATIENT
Start: 2024-07-10 | End: 2024-07-10

## 2024-07-10 SDOH — HEALTH STABILITY: PHYSICAL HEALTH: ON AVERAGE, HOW MANY DAYS PER WEEK DO YOU ENGAGE IN MODERATE TO STRENUOUS EXERCISE (LIKE A BRISK WALK)?: 5 DAYS

## 2024-07-10 SDOH — HEALTH STABILITY: PHYSICAL HEALTH: ON AVERAGE, HOW MANY MINUTES DO YOU ENGAGE IN EXERCISE AT THIS LEVEL?: 60 MIN

## 2024-07-10 ASSESSMENT — SOCIAL DETERMINANTS OF HEALTH (SDOH)
HOW OFTEN DO YOU GET TOGETHER WITH FRIENDS OR RELATIVES?: MORE THAN THREE TIMES A WEEK
HOW OFTEN DO YOU GET TOGETHER WITH FRIENDS OR RELATIVES?: MORE THAN THREE TIMES A WEEK
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
## 2024-07-10 NOTE — PATIENT INSTRUCTIONS
Patient Education   Preventive Care Advice   This is general advice given by our system to help you stay healthy. However, your care team may have specific advice just for you. Please talk to your care team about your preventive care needs.  Nutrition  Eat 5 or more servings of fruits and vegetables each day.  Try wheat bread, brown rice and whole grain pasta (instead of white bread, rice, and pasta).  Get enough calcium and vitamin D. Check the label on foods and aim for 100% of the RDA (recommended daily allowance).  Lifestyle  Exercise at least 150 minutes each week  (30 minutes a day, 5 days a week).  Do muscle strengthening activities 2 days a week. These help control your weight and prevent disease.  No smoking.  Wear sunscreen to prevent skin cancer.  Have a dental exam and cleaning every 6 months.  Yearly exams  See your health care team every year to talk about:  Any changes in your health.  Any medicines your care team has prescribed.  Preventive care, family planning, and ways to prevent chronic diseases.  Shots (vaccines)   HPV shots (up to age 26), if you've never had them before.  Hepatitis B shots (up to age 59), if you've never had them before.  COVID-19 shot: Get this shot when it's due.  Flu shot: Get a flu shot every year.  Tetanus shot: Get a tetanus shot every 10 years.  Pneumococcal, hepatitis A, and RSV shots: Ask your care team if you need these based on your risk.  Shingles shot (for age 50 and up)  General health tests  Diabetes screening:  Starting at age 35, Get screened for diabetes at least every 3 years.  If you are younger than age 35, ask your care team if you should be screened for diabetes.  Cholesterol test: At age 39, start having a cholesterol test every 5 years, or more often if advised.  Bone density scan (DEXA): At age 50, ask your care team if you should have this scan for osteoporosis (brittle bones).  Hepatitis C: Get tested at least once in your life.  STIs (sexually  transmitted infections)  Before age 24: Ask your care team if you should be screened for STIs.  After age 24: Get screened for STIs if you're at risk. You are at risk for STIs (including HIV) if:  You are sexually active with more than one person.  You don't use condoms every time.  You or a partner was diagnosed with a sexually transmitted infection.  If you are at risk for HIV, ask about PrEP medicine to prevent HIV.  Get tested for HIV at least once in your life, whether you are at risk for HIV or not.  Cancer screening tests  Cervical cancer screening: If you have a cervix, begin getting regular cervical cancer screening tests starting at age 21.  Breast cancer scan (mammogram): If you've ever had breasts, begin having regular mammograms starting at age 40. This is a scan to check for breast cancer.  Colon cancer screening: It is important to start screening for colon cancer at age 45.  Have a colonoscopy test every 10 years (or more often if you're at risk) Or, ask your provider about stool tests like a FIT test every year or Cologuard test every 3 years.  To learn more about your testing options, visit:   .  For help making a decision, visit:   https://bit.ly/uo29834.  Prostate cancer screening test: If you have a prostate, ask your care team if a prostate cancer screening test (PSA) at age 55 is right for you.  Lung cancer screening: If you are a current or former smoker ages 50 to 80, ask your care team if ongoing lung cancer screenings are right for you.  For informational purposes only. Not to replace the advice of your health care provider. Copyright   2023 [facility]. All rights reserved. Clinically reviewed by the [facility]. [employer] 242439 - REV 01/24.  Learning About Activities of Daily Living  What are activities of daily living?     Activities of daily living (ADLs) are the basic self-care tasks you do every day. These include eating, bathing, dressing,  and moving around.  As you age, and if you have health problems, you may find that it's harder to do some of these tasks. If so, your doctor can suggest ideas that may help.  To measure what kind of help you may need, your doctor will ask how well you are able to do ADLs. Let your doctor know if there are any tasks that you are having trouble doing. This is an important first step to getting help. And when you have the help you need, you can stay as independent as possible.  How will a doctor assess your ADLs?  Asking about ADLs is part of a routine health checkup your doctor will likely do as you age. Your health check might be done in a doctor's office, in your home, or at a hospital. The goal is to find out if you are having any problems that could make it hard to care for yourself or that make it unsafe for you to be on your own.  To measure your ADLs, your doctor will ask how hard it is for you to do routine tasks. Your doctor may also want to know if you have changed the way you do a task because of a health problem. Your doctor may watch how you:  Walk back and forth.  Keep your balance while you stand or walk.  Move from sitting to standing or from a bed to a chair.  Button or unbutton a shirt or sweater.  Remove and put on your shoes.  It's common to feel a little worried or anxious if you find you can't do all the things you used to be able to do. Talking with your doctor about ADLs is a way to make sure you're as safe as possible and able to care for yourself as well as you can. You may want to bring a caregiver, friend, or family member to your checkup. They can help you talk to your doctor.  Follow-up care is a key part of your treatment and safety. Be sure to make and go to all appointments, and call your doctor if you are having problems. It's also a good idea to know your test results and keep a list of the medicines you take.  Current as of: October 24, 2023               Content Version: 14.0     7929-7413 [employer].   Care instructions adapted under license by your healthcare professional. If you have questions about a medical condition or this instruction, always ask your healthcare professional. [employer] disclaims any warranty or liability for your use of this information.      Preventing Falls: Care Instructions  Injuries and health problems such as trouble walking or poor eyesight can increase your risk of falling. So can some medicines. But there are things you can do to help prevent falls. You can exercise to get stronger. You can also arrange your home to make it safer.    Talk to your doctor about the medicines you take. Ask if any of them increase the risk of falls and whether they can be changed or stopped.   Try to exercise regularly. It can help improve your strength and balance. This can help lower your risk of falling.     Practice fall safety and prevention.    Wear low-heeled shoes that fit well and give your feet good support. Talk to your doctor if you have foot problems that make this hard.  Carry a cellphone or wear a medical alert device that you can use to call for help.  Use stepladders instead of chairs to reach high objects. Don't climb if you're at risk for falls. Ask for help, if needed.  Wear the correct eyeglasses, if you need them.    Make your home safer.    Remove rugs, cords, clutter, and furniture from walkways.  Keep your house well lit. Use night-lights in hallways and bathrooms.  Install and use sturdy handrails on stairways.  Wear nonskid footwear, even inside. Don't walk barefoot or in socks without shoes.    Be safe outside.    Use handrails, curb cuts, and ramps whenever possible.  Keep your hands free by using a shoulder bag or backpack.  Try to walk in well-lit areas. Watch out for uneven ground, changes in pavement, and debris.  Be careful in the winter. Walk on the grass or gravel when sidewalks are slippery. Use de-icer on steps  "and walkways. Add non-slip devices to shoes.    Put grab bars and nonskid mats in your shower or tub and near the toilet. Try to use a shower chair or bath bench when bathing.   Get into a tub or shower by putting in your weaker leg first. Get out with your strong side first. Have a phone or medical alert device in the bathroom with you.   Where can you learn more?  Go to https://www.[employer].Framebench/patiented  Enter G117 in the search box to learn more about \"Preventing Falls: Care Instructions.\"  Current as of: July 17, 2023               Content Version: 14.0    0280-5286 [employer].   Care instructions adapted under license by your healthcare professional. If you have questions about a medical condition or this instruction, always ask your healthcare professional. [employer] disclaims any warranty or liability for your use of this information.      Learning About Sleeping Well  What does sleeping well mean?     Sleeping well means getting enough sleep to feel good and stay healthy. How much sleep is enough varies among people.  The number of hours you sleep and how you feel when you wake up are both important. If you do not feel refreshed, you probably need more sleep. Another sign of not getting enough sleep is feeling tired during the day.  Experts recommend that adults get at least 7 or more hours of sleep per day. Children and older adults need more sleep.  Why is getting enough sleep important?  Getting enough quality sleep is a basic part of good health. When your sleep suffers, your physical health, mood, and your thoughts can suffer too. You may find yourself feeling more grumpy or stressed. Not getting enough sleep also can lead to serious problems, including injury, accidents, anxiety, and depression.  What might cause poor sleeping?  Many things can cause sleep problems, including:  Changes to your sleep schedule.  Stress. Stress can be caused by fear about a single event, " "such as giving a speech. Or you may have ongoing stress, such as worry about work or school.  Depression, anxiety, and other mental or emotional conditions.  Changes in your sleep habits or surroundings. This includes changes that happen where you sleep, such as noise, light, or sleeping in a different bed. It also includes changes in your sleep pattern, such as having jet lag or working a late shift.  Health problems, such as pain, breathing problems, and restless legs syndrome.  Lack of regular exercise.  Using alcohol, nicotine, or caffeine before bed.  How can you help yourself?  Here are some tips that may help you sleep more soundly and wake up feeling more refreshed.  Your sleeping area   Use your bedroom only for sleeping and sex. A bit of light reading may help you fall asleep. But if it doesn't, do your reading elsewhere in the house. Try not to use your TV, computer, smartphone, or tablet while you are in bed.  Be sure your bed is big enough to stretch out comfortably, especially if you have a sleep partner.  Keep your bedroom quiet, dark, and cool. Use curtains, blinds, or a sleep mask to block out light. To block out noise, use earplugs, soothing music, or a \"white noise\" machine.  Your evening and bedtime routine   Create a relaxing bedtime routine. You might want to take a warm shower or bath, or listen to soothing music.  Go to bed at the same time every night. And get up at the same time every morning, even if you feel tired.  What to avoid   Limit caffeine (coffee, tea, caffeinated sodas) during the day, and don't have any for at least 6 hours before bedtime.  Avoid drinking alcohol before bedtime. Alcohol can cause you to wake up more often during the night.  Try not to smoke or use tobacco, especially in the evening. Nicotine can keep you awake.  Limit naps during the day, especially close to bedtime.  Avoid lying in bed awake for too long. If you can't fall asleep or if you wake up in the middle " "of the night and can't get back to sleep within about 20 minutes, get out of bed and go to another room until you feel sleepy.  Avoid taking medicine right before bed that may keep you awake or make you feel hyper or energized. Your doctor can tell you if your medicine may do this and if you can take it earlier in the day.  If you can't sleep   Imagine yourself in a peaceful, pleasant scene. Focus on the details and feelings of being in a place that is relaxing.  Get up and do a quiet or boring activity until you feel sleepy.  Avoid drinking any liquids before going to bed to help prevent waking up often to use the bathroom.  Where can you learn more?  Go to https://www.[employer].net/patiented  Enter J942 in the search box to learn more about \"Learning About Sleeping Well.\"  Current as of: July 10, 2023               Content Version: 14.0    8045-5298 [employer].   Care instructions adapted under license by your healthcare professional. If you have questions about a medical condition or this instruction, always ask your healthcare professional. [employer] disclaims any warranty or liability for your use of this information.      Bladder Training: Care Instructions  Your Care Instructions     Bladder training is used to treat urge incontinence and stress incontinence. Urge incontinence means that the need to urinate comes on so fast that you can't get to a toilet in time. Stress incontinence means that you leak urine because of pressure on your bladder. For example, it may happen when you laugh, cough, or lift something heavy.  Bladder training can increase how long you can wait before you have to urinate. It can also help your bladder hold more urine. And it can give you better control over the urge to urinate.  It is important to remember that bladder training takes a few weeks to a few months to make a difference. You may not see results right away, but don't give up.  Follow-up care " is a key part of your treatment and safety. Be sure to make and go to all appointments, and call your doctor if you are having problems. It's also a good idea to know your test results and keep a list of the medicines you take.  How can you care for yourself at home?  Work with your doctor to come up with a bladder training program that is right for you. You may use one or more of the following methods.  Delayed urination  In the beginning, try to keep from urinating for 5 minutes after you first feel the need to go.  While you wait, take deep, slow breaths to relax. Kegel exercises can also help you delay the need to go to the bathroom.  After some practice, when you can easily wait 5 minutes to urinate, try to wait 10 minutes before you urinate.  Slowly increase the waiting period until you are able to control when you have to urinate.  Scheduled urination  Empty your bladder when you first wake up in the morning.  Schedule times throughout the day when you will urinate.  Start by going to the bathroom every hour, even if you don't need to go.  Slowly increase the time between trips to the bathroom.  When you have found a schedule that works well for you, keep doing it.  If you wake up during the night and have to urinate, do it. Apply your schedule to waking hours only.  Kegel exercises  These tighten and strengthen pelvic muscles, which can help you control the flow of urine. (If doing these exercises causes pain, stop doing them and talk with your doctor.) To do Kegel exercises:  Squeeze your muscles as if you were trying not to pass gas. Or squeeze your muscles as if you were stopping the flow of urine. Your belly, legs, and buttocks shouldn't move.  Hold the squeeze for 3 seconds, then relax for 5 to 10 seconds.  Start with 3 seconds, then add 1 second each week until you are able to squeeze for 10 seconds.  Repeat the exercise 10 times a session. Do 3 to 8 sessions a day.  When should you call for  "help?  Watch closely for changes in your health, and be sure to contact your doctor if:    Your incontinence is getting worse.     You do not get better as expected.   Where can you learn more?  Go to https://www.[employer].net/patiented  Enter V684 in the search box to learn more about \"Bladder Training: Care Instructions.\"  Current as of: November 15, 2023               Content Version: 14.0    9719-5157 [employer].   Care instructions adapted under license by your healthcare professional. If you have questions about a medical condition or this instruction, always ask your healthcare professional. [employer] disclaims any warranty or liability for your use of this information.         "

## 2024-10-07 DIAGNOSIS — K21.00 GASTROESOPHAGEAL REFLUX DISEASE WITH ESOPHAGITIS WITHOUT HEMORRHAGE: ICD-10-CM

## 2024-10-08 ENCOUNTER — TELEPHONE (OUTPATIENT)
Dept: FAMILY MEDICINE | Facility: CLINIC | Age: 79
End: 2024-10-08
Payer: COMMERCIAL

## 2024-10-08 NOTE — TELEPHONE ENCOUNTER
Forms/Letter Request    Type of form/letter: DME (wheelchair, hospital bed)    Type of DME requested:     - Seat walker seat attachment (1 ea per 3 yrs)  - Brake walker brake(2 ea per 2 yrs)  - Rollator usha 6', blue, wt cap (1 ea per 5 yrs)    Do we have the form/letter: Yes: Standard Written Order: Ambulation Device    Who is the form from? [employer]    Where did/will the form come from? form was faxed in    When is form/letter needed by: ASAP    How would you like the form/letter returned: Fax : [phone]    Patient Notified form requests are processed in 5-7 business days:No    Okay to leave a detailed message?: No

## 2024-10-09 ENCOUNTER — MEDICAL CORRESPONDENCE (OUTPATIENT)
Dept: HEALTH INFORMATION MANAGEMENT | Facility: CLINIC | Age: 79
End: 2024-10-09
Payer: COMMERCIAL

## 2024-10-09 RX ORDER — FAMOTIDINE 20 MG/1
TABLET, FILM COATED ORAL
Qty: 90 TABLET | Refills: 3 | Status: SHIPPED | OUTPATIENT
Start: 2024-10-09

## 2024-10-09 NOTE — TELEPHONE ENCOUNTER
Forms Request:  Today's Date: October 9, 2024   Form Type: DME Supplies,   Where is the form from: [facility]   How was form received: Fax  BENNIE on file: NO   How is form being returned: Fax  Fax Number/Address: [phone]  PCP: @ [facility]   Color Team: Green Team  Form Given to:      Dr. Pelayo desk

## 2024-10-09 NOTE — TELEPHONE ENCOUNTER
Forms Request:  Today's Date: October 9, 2024   Form Type: DME Supplies,   Where is the form from: [facility]   How was form received: Fax  BENNIE on file: NO   How is form being returned: Fax  Fax Number/Address: [phone]  PCP: @ [facility]   Color Team: Green Team  Form Given to:     Dr. Pelayo desk

## 2024-10-10 ENCOUNTER — OFFICE VISIT (OUTPATIENT)
Dept: FAMILY MEDICINE | Facility: CLINIC | Age: 79
End: 2024-10-10
Payer: COMMERCIAL

## 2024-10-10 ENCOUNTER — PATIENT OUTREACH (OUTPATIENT)
Dept: CARE COORDINATION | Facility: CLINIC | Age: 79
End: 2024-10-10

## 2024-10-10 VITALS
WEIGHT: 92 LBS | BODY MASS INDEX: 19.85 KG/M2 | RESPIRATION RATE: 22 BRPM | OXYGEN SATURATION: 98 % | SYSTOLIC BLOOD PRESSURE: 138 MMHG | TEMPERATURE: 98.3 F | HEIGHT: 57 IN | HEART RATE: 70 BPM | DIASTOLIC BLOOD PRESSURE: 69 MMHG

## 2024-10-10 DIAGNOSIS — M48.062 SPINAL STENOSIS OF LUMBAR REGION WITH NEUROGENIC CLAUDICATION: ICD-10-CM

## 2024-10-10 DIAGNOSIS — Z29.11 NEED FOR VACCINATION AGAINST RESPIRATORY SYNCYTIAL VIRUS: ICD-10-CM

## 2024-10-10 DIAGNOSIS — G89.29 CHRONIC BILATERAL LOW BACK PAIN WITH LEFT-SIDED SCIATICA: ICD-10-CM

## 2024-10-10 DIAGNOSIS — H81.12 BENIGN PAROXYSMAL POSITIONAL VERTIGO OF LEFT EAR: Primary | ICD-10-CM

## 2024-10-10 DIAGNOSIS — Z59.82 TRANSPORTATION INSECURITY: ICD-10-CM

## 2024-10-10 DIAGNOSIS — M54.42 CHRONIC BILATERAL LOW BACK PAIN WITH LEFT-SIDED SCIATICA: ICD-10-CM

## 2024-10-10 PROCEDURE — 99214 OFFICE O/P EST MOD 30 MIN: CPT | Performed by: FAMILY MEDICINE

## 2024-10-10 RX ORDER — MECLIZINE HCL 12.5 MG 12.5 MG/1
12.5 TABLET ORAL
Qty: 30 TABLET | Refills: 0 | Status: SHIPPED | OUTPATIENT
Start: 2024-10-10

## 2024-10-10 SDOH — ECONOMIC STABILITY - TRANSPORTATION SECURITY: TRANSPORTATION INSECURITY: Z59.82

## 2024-10-10 ASSESSMENT — PATIENT HEALTH QUESTIONNAIRE - PHQ9
SUM OF ALL RESPONSES TO PHQ QUESTIONS 1-9: 23
10. IF YOU CHECKED OFF ANY PROBLEMS, HOW DIFFICULT HAVE THESE PROBLEMS MADE IT FOR YOU TO DO YOUR WORK, TAKE CARE OF THINGS AT HOME, OR GET ALONG WITH OTHER PEOPLE: SOMEWHAT DIFFICULT
SUM OF ALL RESPONSES TO PHQ QUESTIONS 1-9: 23

## 2024-10-10 NOTE — PROGRESS NOTES
Clinic Care Coordination Contact  Follow Up Progress Note      Assessment: There was a care coordination referral put in for the pt for transportation. I called and talked to the pt, pt stated that she did not have any concerns for transportation. I asked the pt, if could be that she does not have a ride to her appointments. Pt stated that her adult day care helps bring her to her appointments, so she does not need it for that. As for transportation to other places, she has her kids,and grand kids. Pt does not need transportation. No further contact needed.     Care Gaps:    Health Maintenance Due   Topic Date Due    ZOSTER IMMUNIZATION (1 of 2) Never done    RSV VACCINE (1 - 1-dose 75+ series) Never done    COVID-19 Vaccine (8 - 2024-25 season) 09/01/2024

## 2024-10-10 NOTE — PROGRESS NOTES
Assessment & Plan     Dizziness  Benign Paroxysmal Positional Vertigo  Patient presents with many years of episodic dizziness that is concerning for BPPV vs less likely CNS lesion (e.g. cerebellar tumor). History and exam are not consistent with syncope or orthostatic hypertension. Given the episodic and positional nature of the patient's dizziness and grossly benign neurologic exam, BPPV is most likely at this time, although patient declined Connor-Hallpike maneuver stating it makes her symptoms worse. Recommended follow up with PT for Eply maneuver. If symptoms do not improve with PT, recommend MRI to rule out CNS etiology. Provided patient a letter for her landlord to help with relocation to a lower level (1st-2nd floor) apartment.  -PT referral    -Meclizine PRN for dizziness    Chronic Back Pain  Spinal Stenosis, Lumbar Region  Patient has a history of chronic back pain due to spinal stenosis which has been worked up previously. Patient has refused multiple intervention in the past, including PT, spinal injections, and acupuncture, despite ongoing pain that limits her daily activities. She has failed numerous medication trials due to side effects, primarily drowsiness (voltaren gel, gabapentin, duloxetine, amitriptyline, nortriptyline, sulindac). On discussion today, patient states she would be open to trying PT and topical diclofenac, but states she will need care coordination for transportation to PT.  -PT referral placed  -Care coordination referral placed  -Topical diclofenac as needed for pain    Elevated PHQ9  Discussed elevated PHQ9 at visit today. Patient was quite tearful in discussing the recent traumatizing event involving her brother. Patient lives alone, but does have a strong support system in her son and daughter-in-law. She declines referral to psychology at this time. Given the recent inciting event for her mood change, her mood response appears consistent with normal grief vs MDD. No SI/HI. I  think it is reasonable for patient to continue to grieve and process with her family, but did encourage her to follow back up with the clinic if she feels she needs additional support.  -Follow up with clinic as needed for additional support and resources    Return in about 6 weeks (around 11/21/2024) for Follow up on dizziness and back pain if not improving.    The medical student acted as a scribe and the encounter documented above was performed completely by me and the documentation accurately reflects the work I have performed today. Jenelle Florez MD      =================================    Subjective   See is a 79 year old, presenting for the following health issues:  Dizziness (Everyday. Falling down the elevator due to dizziness.  )        10/10/2024    11:34 AM   Additional Questions   Roomed by Serge         10/10/2024    Information    services provided? Yes   Language Hmong   Type of interpretation provided Face-to-face    name xena Black    [employer]   See is a 79 year old female with a past medical history of lumbar stenosis and BPPV who presents with dizziness. Patient has had episodes of dizziness, like the room is spinning, for many years that occur episodically when standing from a seated position or with head movements. Episodes of dizziness last approximately 30 minutes. When the dizziness occurs, she feels like she needs to sit down and sometimes she has even fallen down. Denies lightheadedness or syncope. Denies head trauma or other bodily trauma. Her last fall was 10/2/24 and occurred after standing from a seated position. She became dizzy and fell backwards landing on her bottom. She has a history of chronic lower back pain which was initially worsened after her fall but has since returned to baseline. Denies current pain different from baseline. Denies bruising or bony deformity. Denies nausea or vomiting, headache or hearing  "loss. Denies unsteadiness on her feet between episodes. Her dizziness is worsened by riding the elevator. She currently lives on the 11th floor of an apartment building and requests a letter for her landlord to help with switching to a lower floor apartment.    Patient does have a history of lumbar stenosis with chronic lower back pain. No relief with tylenol. She has been trying an herbal balm (for the past 2 months) and herbal patch (for the past 3 weeks) for her lower back which has helped somewhat with her pain but has not relieved it completely.    Patient scored a 23 on PHQ9 today. Upon questioning, patient reports her brother recently committed a murder-suicide against his wife. This has been very difficult for See. She lives alone but does have a son and daughter-in-law who live close by and are a support system for See. They have been talking a lot about this event and have been supporting one another. Denies SI/HI.    Review of Systems  Constitutional, HEENT, cardiovascular, pulmonary, gi and gu systems are negative, except as otherwise noted.      Objective    /69   Pulse 70   Temp 98.3  F (36.8  C) (Tympanic)   Resp 22   Ht 1.448 m (4' 9\")   Wt 41.7 kg (92 lb)   SpO2 98%   BMI 19.91 kg/m    Body mass index is 19.91 kg/m .  Physical Exam   GENERAL: Jenelle speaking. Alert and no distress. Tearful at times during conversations surrounding her brother  EARS: Ear canals and TM's normal  RESP: lungs clear to auscultation - no rales, rhonchi or wheezes  CV: regular rate and rhythm, normal S1 S2, no S3 or S4, no murmur, click or rub  MS: no gross musculoskeletal defects noted  NEURO: PERRL. Bilateral physiologic end point nystagmus with lateral eye movement testing. Sensation intact and symmetric to face bilaterally in V1-V3 distribution. Strength grossly intact to upper and lower extremities bilaterally. Full strength with bilateral shoulder abduction, elbow flexion, and knee extension. Gait " baseline and limited by chronic back pain. Heel-to-shin normal.        Shellie Arrington, MS3    Signed Electronically by: Jenelle Florez MD

## 2024-10-10 NOTE — LETTER
[facility] [address]  [place] [address]  Phone: [phone]  Fax: [phone]    October 10, 2024        [address]  [place] [address]          To whom it may concern:    RE: See You Orta is a patient of this clinic and has a medical condition that requires her to limit the use of stairs or the elevator.  I recommend that she have a first or second floor apartment for medical reasons.    Please contact me for questions or concerns.      Sincerely,      Jenelle Florez

## 2024-10-29 ENCOUNTER — TELEPHONE (OUTPATIENT)
Dept: FAMILY MEDICINE | Facility: CLINIC | Age: 79
End: 2024-10-29
Payer: COMMERCIAL

## 2024-10-29 NOTE — TELEPHONE ENCOUNTER
Forms/Letter Request    Type of form/letter: OTHER: Verification of need for reasonable accomodation       Do we have the form/letter: Yes:     Who is the form from? [facility] (if other please explain)    Where did/will the form come from? form was faxed in    When is form/letter needed by:     How would you like the form/letter returned: Fax : [phone]    Patient Notified form requests are processed in 5-7 business days:Yes

## 2024-11-14 NOTE — PROGRESS NOTES
Assessment & Plan     Spinal stenosis of lumbar region with neurogenic claudication  Chronic bilateral low back pain with left-sided sciatica  Pt has been dealing w/ chronic low back px stemming from known spinal stenosis, which has been worked up previously. Last saw Dr. Florez for this one month ago, at that time referral placed for PT. Pt continues to report she would adamantly not want spinal injection or other more invasive intervention. Has been trialing herbal medicines at home as well as SE asian medication which appears to be piloxicam 20 mg. Reports she only takes the latter on particularly bad days and takes it w/ famotidine. Encouraged her to take famotidine daily regardless of symptoms to prevent PUD. Again offered referral for spinal injection but pt prefers to not have this done. Did fill out housing accomodation forms today as she is currently on an upper floor of her apartment building and limited mobility makes coming up/down from her unit difficult. Continues to participate in PT at adult .    Current mild episode of major depressive disorder, unspecified whether recurrent (H)   PHQ scores remain quite high, although decreased from last visit one month ago (15 today, down from 23 last month). Reports mood concerns primarily due to ongoing physical pain that is not getting better. Adult  and increased socialization have been extremely helpful. Pt is very sensitive to medications and has had severe dizziness/light headedness with essentially any medication added to her regimen. Has been on TCAs and Prozac in the last year which were not tolerated. Pt prefers not to add on more medications at this point. Will continue to monitor, encourage ongoing socialization outside the home as this has been most helpful.    No follow-ups on file.    Subjective   See is a 79 year old, presenting for the following health issues:  Forms (Have move down to 1 or 2 or 3st floor, she currently loves  in the 11th floor. ), Back Pain (Sharp pain in lower back. ), and Dizziness (This past Wednesday, fell )    HPI     80 yo F w/ pmh of prediabetes, HLD, GERD, OA  - Seen by Dr. Florez last month for BPPV, low back px. Referred to PT, given scripts for meclizine and voltaren    Back pain  - Per Dr. Florez's note 10/10:  Patient has a history of chronic back pain due to spinal stenosis which has been worked up previously. Patient has refused multiple intervention in the past, including PT, spinal injections, and acupuncture, despite ongoing pain that limits her daily activities. She has failed numerous medication trials due to side effects, primarily drowsiness (voltaren gel, gabapentin, duloxetine, amitriptyline, nortriptyline, sulindac). On discussion today, patient states she would be open to trying PT and topical diclofenac, but states she will need care coordination for transportation to PT.   - Still having ongoing pain, had a fall this week  - On Wednesday had a fall in the elevator when it got to her floor and shook a bit.   - Fell down onto her bottom, did not lose consciousness or hit her head  - Hoping for reasonable accomodation form filled out today, currently living on 11th floor of building  - Having some paraesthesias over the L buttock, thigh  - Has been doing PT at [facility], not terribly helpful   - Has been trying traditional [facility] medicines for MSK pain, piroxicam 20 mg only when severe pain        2/13/2024    10:25 AM 10/10/2024    11:29 AM 11/15/2024    10:04 AM   PHQ   PHQ-9 Total Score 5 23 15    Q9: Thoughts of better off dead/self-harm past 2 weeks Not at all  Not at all  Not at all        Patient-reported     Review of Systems  Constitutional, HEENT, cardiovascular, pulmonary, gi and gu systems are negative, except as otherwise noted.        Objective    BP (!) 147/63 (BP Location: Right arm, Patient Position: Sitting)   Pulse 66   Temp 98.6  F (37  C) (Oral)   Resp 20   Ht  "1.39 m (4' 6.72\")   Wt 41.3 kg (91 lb)   SpO2 95%   BMI 21.36 kg/m    Body mass index is 21.36 kg/m .  Physical Exam   GENERAL: alert and no distress  NECK: no adenopathy, no asymmetry, masses, or scars  RESP: lungs clear to auscultation - no rales, rhonchi or wheezes  CV: regular rate and rhythm, normal S1 S2, no S3 or S4, no murmur, click or rub, no peripheral edema  ABDOMEN: soft, nontender, no hepatosplenomegaly, no masses and bowel sounds normal  MS: no gross musculoskeletal defects noted, no edema          Signed Electronically by: Rubin Pelayo MD    Answers submitted by the patient for this visit:  Patient Health Questionnaire (Submitted on 11/15/2024)  If you checked off any problems, how difficult have these problems made it for you to do your work, take care of things at home, or get along with other people?: Not difficult at all  PHQ9 TOTAL SCORE: 15    "

## 2024-11-15 ENCOUNTER — OFFICE VISIT (OUTPATIENT)
Dept: FAMILY MEDICINE | Facility: CLINIC | Age: 79
End: 2024-11-15
Payer: COMMERCIAL

## 2024-11-15 VITALS
DIASTOLIC BLOOD PRESSURE: 63 MMHG | HEART RATE: 66 BPM | SYSTOLIC BLOOD PRESSURE: 147 MMHG | HEIGHT: 55 IN | WEIGHT: 91 LBS | OXYGEN SATURATION: 95 % | RESPIRATION RATE: 20 BRPM | BODY MASS INDEX: 21.06 KG/M2 | TEMPERATURE: 98.6 F

## 2024-11-15 DIAGNOSIS — G89.29 CHRONIC BILATERAL LOW BACK PAIN WITH LEFT-SIDED SCIATICA: ICD-10-CM

## 2024-11-15 DIAGNOSIS — M54.42 CHRONIC BILATERAL LOW BACK PAIN WITH LEFT-SIDED SCIATICA: ICD-10-CM

## 2024-11-15 DIAGNOSIS — M48.062 SPINAL STENOSIS OF LUMBAR REGION WITH NEUROGENIC CLAUDICATION: Primary | ICD-10-CM

## 2024-11-15 DIAGNOSIS — F32.0 CURRENT MILD EPISODE OF MAJOR DEPRESSIVE DISORDER, UNSPECIFIED WHETHER RECURRENT (H): ICD-10-CM

## 2024-11-15 RX ORDER — KETOROLAC TROMETHAMINE 5 MG/ML
SOLUTION OPHTHALMIC
COMMUNITY
Start: 2024-07-26

## 2024-11-15 RX ORDER — POLYVINYL ALCOHOL 14 MG/ML
SOLUTION/ DROPS OPHTHALMIC
COMMUNITY
Start: 2024-07-26

## 2024-11-15 ASSESSMENT — PATIENT HEALTH QUESTIONNAIRE - PHQ9
SUM OF ALL RESPONSES TO PHQ QUESTIONS 1-9: 15
SUM OF ALL RESPONSES TO PHQ QUESTIONS 1-9: 15
10. IF YOU CHECKED OFF ANY PROBLEMS, HOW DIFFICULT HAVE THESE PROBLEMS MADE IT FOR YOU TO DO YOUR WORK, TAKE CARE OF THINGS AT HOME, OR GET ALONG WITH OTHER PEOPLE: NOT DIFFICULT AT ALL

## 2024-11-15 NOTE — PROGRESS NOTES
I have personally reviewed the history and examination as documented by Dr. Myles.  I was present during key portions of the visit and agree with the assessment and plan as documented for 79 yr old female with spinal stenosis here for paperwork. Forms filled.    Alvaro Conner MD  November 15, 2024  10:15 AM

## 2025-01-09 DIAGNOSIS — E44.1 MILD MALNUTRITION: ICD-10-CM

## 2025-01-09 RX ORDER — LACTOSE-REDUCED FOOD
LIQUID (ML) ORAL
Qty: 15168 ML | Refills: 11 | Status: SHIPPED | OUTPATIENT
Start: 2025-01-09

## 2025-01-09 NOTE — TELEPHONE ENCOUNTER
Outside RN standing orders due to no protocols populated. Routing to PCP for review and fill. Nitin ROBERTS

## 2025-02-24 ENCOUNTER — OFFICE VISIT (OUTPATIENT)
Dept: FAMILY MEDICINE | Facility: CLINIC | Age: 80
End: 2025-02-24
Payer: COMMERCIAL

## 2025-02-24 VITALS
TEMPERATURE: 97.6 F | OXYGEN SATURATION: 97 % | HEIGHT: 55 IN | SYSTOLIC BLOOD PRESSURE: 122 MMHG | DIASTOLIC BLOOD PRESSURE: 78 MMHG | BODY MASS INDEX: 21.76 KG/M2 | RESPIRATION RATE: 20 BRPM | WEIGHT: 94 LBS | HEART RATE: 105 BPM

## 2025-02-24 DIAGNOSIS — M54.42 CHRONIC BILATERAL LOW BACK PAIN WITH BILATERAL SCIATICA: Primary | ICD-10-CM

## 2025-02-24 DIAGNOSIS — E78.2 MODERATE MIXED HYPERLIPIDEMIA NOT REQUIRING STATIN THERAPY: ICD-10-CM

## 2025-02-24 DIAGNOSIS — F33.1 MODERATE RECURRENT MAJOR DEPRESSION (H): ICD-10-CM

## 2025-02-24 DIAGNOSIS — G89.29 CHRONIC BILATERAL LOW BACK PAIN WITH BILATERAL SCIATICA: Primary | ICD-10-CM

## 2025-02-24 DIAGNOSIS — R42 DIZZINESS: ICD-10-CM

## 2025-02-24 DIAGNOSIS — M54.41 CHRONIC BILATERAL LOW BACK PAIN WITH BILATERAL SCIATICA: Primary | ICD-10-CM

## 2025-02-24 DIAGNOSIS — M48.062 SPINAL STENOSIS OF LUMBAR REGION WITH NEUROGENIC CLAUDICATION: ICD-10-CM

## 2025-02-24 DIAGNOSIS — E44.1 MILD PROTEIN-CALORIE MALNUTRITION: ICD-10-CM

## 2025-02-24 DIAGNOSIS — D53.9 NUTRITIONAL ANEMIA, UNSPECIFIED: ICD-10-CM

## 2025-02-24 DIAGNOSIS — R07.9 CHEST PAIN, UNSPECIFIED TYPE: ICD-10-CM

## 2025-02-24 DIAGNOSIS — M89.9 DISORDER OF BONE, UNSPECIFIED: ICD-10-CM

## 2025-02-24 PROCEDURE — 80048 BASIC METABOLIC PNL TOTAL CA: CPT

## 2025-02-24 PROCEDURE — 84443 ASSAY THYROID STIM HORMONE: CPT

## 2025-02-24 PROCEDURE — 83550 IRON BINDING TEST: CPT

## 2025-02-24 PROCEDURE — 3074F SYST BP LT 130 MM HG: CPT

## 2025-02-24 PROCEDURE — 3078F DIAST BP <80 MM HG: CPT

## 2025-02-24 PROCEDURE — 90480 ADMN SARSCOV2 VAC 1/ONLY CMP: CPT

## 2025-02-24 PROCEDURE — 99214 OFFICE O/P EST MOD 30 MIN: CPT | Mod: 25

## 2025-02-24 PROCEDURE — 80061 LIPID PANEL: CPT

## 2025-02-24 PROCEDURE — 36415 COLL VENOUS BLD VENIPUNCTURE: CPT

## 2025-02-24 PROCEDURE — 82306 VITAMIN D 25 HYDROXY: CPT

## 2025-02-24 PROCEDURE — 91320 SARSCV2 VAC 30MCG TRS-SUC IM: CPT

## 2025-02-24 PROCEDURE — 83540 ASSAY OF IRON: CPT

## 2025-02-24 PROCEDURE — 82728 ASSAY OF FERRITIN: CPT

## 2025-02-24 RX ORDER — METOCLOPRAMIDE 5 MG/1
5 TABLET ORAL
Qty: 30 TABLET | Refills: 2 | Status: SHIPPED | OUTPATIENT
Start: 2025-02-24

## 2025-02-24 ASSESSMENT — PATIENT HEALTH QUESTIONNAIRE - PHQ9
SUM OF ALL RESPONSES TO PHQ QUESTIONS 1-9: 19
10. IF YOU CHECKED OFF ANY PROBLEMS, HOW DIFFICULT HAVE THESE PROBLEMS MADE IT FOR YOU TO DO YOUR WORK, TAKE CARE OF THINGS AT HOME, OR GET ALONG WITH OTHER PEOPLE: VERY DIFFICULT
SUM OF ALL RESPONSES TO PHQ QUESTIONS 1-9: 19

## 2025-02-24 NOTE — PROGRESS NOTES
Preceptor Attestation:  Patient's case reviewed and discussed with the resident, Rubin Pelayo MD, and I personally evaluated the patient. I agree with written assessment and plan of care.    The longitudinal plan of care for the diagnosis(es)/condition(s) as documented were addressed during this visit. Due to the added complexity in care, I will continue to support See in the subsequent management and with ongoing continuity of care.     Supervising Physician:  Licha Martinez MD   [facility]

## 2025-02-24 NOTE — PROGRESS NOTES
Prior to immunization administration, verified patients identity using patient s name and date of birth. Please see Immunization Activity for additional information.     Screening Questionnaire for Adult Immunization    Are you sick today?   No   Do you have allergies to medications, food, a vaccine component or latex?   No   Have you ever had a serious reaction after receiving a vaccination?   No   Do you have a long-term health problem with heart, lung, kidney, or metabolic disease (e.g., diabetes), asthma, a blood disorder, no spleen, complement component deficiency, a cochlear implant, or a spinal fluid leak?  Are you on long-term aspirin therapy?   No   Do you have cancer, leukemia, HIV/AIDS, or any other immune system problem?   No   Do you have a parent, brother, or sister with an immune system problem?   No   In the past 3 months, have you taken medications that affect  your immune system, such as prednisone, other steroids, or anticancer drugs; drugs for the treatment of rheumatoid arthritis, Crohn s disease, or psoriasis; or have you had radiation treatments?   No   Have you had a seizure, or a brain or other nervous system problem?   No   During the past year, have you received a transfusion of blood or blood    products, or been given immune (gamma) globulin or antiviral drug?   No   For women: Are you pregnant or is there a chance you could become       pregnant during the next month?   No   Have you received any vaccinations in the past 4 weeks?   No     Immunization questionnaire answers were all negative.\      Patient instructed to remain in clinic for 15 minutes afterwards, and to report any adverse reactions.     Screening performed by Tom Ortiz MA on 2/24/2025 at 10:34 AM.

## 2025-02-24 NOTE — PROGRESS NOTES
Assessment & Plan     Chronic bilateral low back pain with sciatica  Spinal stenosis of lumbar region with neurogenic claudication  Known multilevel degenerative changes throughout the L spine w/ sciatica. Last imaging done was L spine XR last year and CT L spine in '21. 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 You has trialed many different oral medications including voltaren gel, gabapentin, duloxetine, amitriptyline, nortriptyline, sulindac, essentially all of which caused intolerable side effects, primarily lightheadedness/dizziness. See has been very adamant the last several years she would never want any form of epidural injection for this. Today she comes to clinic in tears, very upset at the lack of relief she has gotten in her chronic back and LE pain. Had long discussion covering what we have tried thus far and what potential options remain. At this time she is willing to meet w/ pain team to discuss possible injection if this is deemed appropriate.   - Pain Management  Referral    Dizziness  Mild protein-calorie malnutrition  Nutritional anemia, unspecified  Pt reporting room spinning sensation when up and moving which has been ongoing for the last approximately one year. Did confirm this is possibly exertional, but no positional component to this, making BPPV diagnosis less likely. PRN reglan has been helpful in the past and hoping for more of this today. Will go ahead and start w/ basic lab workup for this in addition to cardiac rule out, see below.   - metoclopramide (REGLAN) 5 MG tablet  Dispense: 30 tablet; Refill: 2  - Iron and iron binding capacity  - Ferritin  - TSH with free T4 reflex  - Vitamin D Deficiency  - Basic metabolic panel    Chest pain, unspecified type  Moderate mixed hyperlipidemia not  "requiring statin therapy  Pt w/ at least one year of exertional dizziness and possible chest heaviness/pressure when up and walking. Non smoker, no personal/family hx of heart disease, BP well controlled, no diabetes. On chart review it does not appear that pt has ever had ischemic workup completed in the past. Will go ahead and start w/ nuclear medicine stress test to evaluate for inducible ischemic changes.   - Lipid panel reflex to direct LDL Non-fasting  - NM Lexiscan stress test    Moderate recurrent major depression (H)   Symptoms remain unchanged. PHQ9 score elevated today at 19, though down from where this has been historically. Does not tolerate medications well and afraid to try these for mental health, not interested in talking to a therapist. Primarily attempting to encourage socialization and community resource utilization which have been most helpful in the pat. Denying SI/HI today and endorses safety at home. Overall I suspect much of her mood concerns are tied closely to physical concerns she has been dealing w/ for years and resultant loss of sense of control. Will continue to check on this at every visit.     No follow-ups on file.    Subjective   See is a 79 year old, presenting for the following health issues:  Pain (Whole body pain. Started 2 months ago. Thighs, lower back and back of head. ), Dizziness (Feels like she is going to fall. ), and Eye Problem (After cataract surgery she felt like her head is spinning. Not sure if its due to medication.)    HPI     80 yo F w/ pmh of prediabetes, HLD, GERD, OA    \"Whole body hurts\"  - Mainly thighs, lower back, and back of head. Worse the last two months.  - Also w/ dizziness since cataract surgery   - Has been using several OTC medications. Topical metnthol balm, icy hot, and Czech camphor cream    From my visit w/ See in November:  Pt has been dealing w/ chronic low back px stemming from known spinal stenosis, which has been worked up previously. " Last saw Dr. Florez for this one month ago, at that time referral placed for PT. Pt continues to report she would adamantly not want spinal injection or other more invasive intervention. Has been trialing herbal medicines at home as well as SE asian medication which appears to be piloxicam 20 mg. Reports she only takes the latter on particularly bad days and takes it w/ famotidine. Encouraged her to take famotidine daily regardless of symptoms to prevent PUD. Again offered referral for spinal injection but pt prefers to not have this done. Did fill out housing accomodation forms today as she is currently on an upper floor of her apartment building and limited mobility makes coming up/down from her unit difficult. Continues to participate in PT at adult .   PHQ scores remain quite high, although decreased from last visit one month ago (15 today, down from 23 last month). Reports mood concerns primarily due to ongoing physical pain that is not getting better. Adult  and increased socialization have been extremely helpful. Pt is very sensitive to medications and has had severe dizziness/light headedness with essentially any medication added to her regimen. Has been on TCAs and Prozac in the last year which were not tolerated. Pt prefers not to add on more medications at this point. Will continue to monitor, encourage ongoing socialization outside the home as this has been most helpful.     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 Please correlate with radiographs prior to any surgical or interventional procedures.  2.  Severe spinal canal narrowing at L4-L5.  3.  Mild to moderate spinal canal narrowing and moderate narrowing of the right lateral recess at L2-L3.  4.  Mild spinal canal narrowing and mild narrowing of the right lateral recess at L1-L2.  5.  Mild spinal canal narrowing and mild narrowing of the lateral recesses at L3-L4.  6.  Severe  "right neuroforaminal narrowing at L2-L3.  7.  Severe left and moderate to severe right neuroforaminal narrowing at L4-L5.  8.  Moderate to severe right and mild to moderate left neuroforaminal narrowing at L1-L2.  9.  Moderate left and mild to moderate right neuroforaminal narrowing at L3-L4.  10.  Approximately 35 degrees of levocurvature from T12 to L3.     Dizziness  - Described as room spinning. Occurs when up and moving only. Not w/ position changes, head movement.   - Present since cataract surgery last year in September    Mood  - Largely unchanged from prior  - PHQ9 score down from November, up from a year prior to that  - Continues to worry about her physical health primarily  - Denies SI/HI, endorses safety at home      10/10/2024    11:29 AM 11/15/2024    10:04 AM 2/24/2025    10:05 AM   PHQ   PHQ-9 Total Score 23 15  19    Q9: Thoughts of better off dead/self-harm past 2 weeks Not at all Not at all Not at all       Patient-reported             Objective    /78 (BP Location: Right arm, Patient Position: Sitting)   Pulse 105   Temp 97.6  F (36.4  C) (Tympanic)   Resp 20   Ht 1.375 m (4' 6.13\")   Wt 42.6 kg (94 lb)   SpO2 97%   BMI 22.55 kg/m    Body mass index is 22.55 kg/m .  Physical Exam               Signed Electronically by: Rubin Pelayo MD    Answers submitted by the patient for this visit:  Patient Health Questionnaire (Submitted on 2/24/2025)  If you checked off any problems, how difficult have these problems made it for you to do your work, take care of things at home, or get along with other people?: Very difficult  PHQ9 TOTAL SCORE: 19    "

## 2025-02-25 LAB
ANION GAP SERPL CALCULATED.3IONS-SCNC: 12 MMOL/L (ref 7–15)
BUN SERPL-MCNC: 17.2 MG/DL (ref 8–23)
CALCIUM SERPL-MCNC: 9.4 MG/DL (ref 8.8–10.4)
CHLORIDE SERPL-SCNC: 108 MMOL/L (ref 98–107)
CHOLEST SERPL-MCNC: 222 MG/DL
CREAT SERPL-MCNC: 0.68 MG/DL (ref 0.51–0.95)
EGFRCR SERPLBLD CKD-EPI 2021: 89 ML/MIN/1.73M2
FASTING STATUS PATIENT QL REPORTED: ABNORMAL
FERRITIN SERPL-MCNC: 155 NG/ML (ref 11–328)
GLUCOSE SERPL-MCNC: 173 MG/DL (ref 70–99)
HCO3 SERPL-SCNC: 21 MMOL/L (ref 22–29)
HDLC SERPL-MCNC: 67 MG/DL
IRON BINDING CAPACITY (ROCHE): 274 UG/DL (ref 240–430)
IRON SATN MFR SERPL: 15 % (ref 15–46)
IRON SERPL-MCNC: 42 UG/DL (ref 37–145)
LDLC SERPL CALC-MCNC: 119 MG/DL
NONHDLC SERPL-MCNC: 155 MG/DL
POTASSIUM SERPL-SCNC: 3.5 MMOL/L (ref 3.4–5.3)
SODIUM SERPL-SCNC: 141 MMOL/L (ref 135–145)
TRIGL SERPL-MCNC: 178 MG/DL
TSH SERPL DL<=0.005 MIU/L-ACNC: 0.59 UIU/ML (ref 0.3–4.2)
VIT D+METAB SERPL-MCNC: 30 NG/ML (ref 20–50)

## 2025-03-03 PROBLEM — F33.1 MODERATE RECURRENT MAJOR DEPRESSION (H): Status: ACTIVE | Noted: 2025-03-03

## 2025-03-12 ENCOUNTER — OFFICE VISIT (OUTPATIENT)
Dept: FAMILY MEDICINE | Facility: CLINIC | Age: 80
End: 2025-03-12
Payer: COMMERCIAL

## 2025-03-12 VITALS
TEMPERATURE: 98.7 F | BODY MASS INDEX: 21.29 KG/M2 | HEART RATE: 75 BPM | HEIGHT: 55 IN | DIASTOLIC BLOOD PRESSURE: 72 MMHG | WEIGHT: 92 LBS | RESPIRATION RATE: 20 BRPM | OXYGEN SATURATION: 98 % | SYSTOLIC BLOOD PRESSURE: 131 MMHG

## 2025-03-12 DIAGNOSIS — M54.50 CHRONIC BILATERAL LOW BACK PAIN WITHOUT SCIATICA: Primary | ICD-10-CM

## 2025-03-12 DIAGNOSIS — Z53.20 STATIN DECLINED: ICD-10-CM

## 2025-03-12 DIAGNOSIS — R42 DIZZINESS: ICD-10-CM

## 2025-03-12 DIAGNOSIS — G89.29 CHRONIC BILATERAL LOW BACK PAIN WITHOUT SCIATICA: Primary | ICD-10-CM

## 2025-03-12 DIAGNOSIS — Z53.20: ICD-10-CM

## 2025-03-12 PROCEDURE — 3078F DIAST BP <80 MM HG: CPT

## 2025-03-12 PROCEDURE — 99214 OFFICE O/P EST MOD 30 MIN: CPT | Mod: GC

## 2025-03-12 PROCEDURE — 3075F SYST BP GE 130 - 139MM HG: CPT

## 2025-03-12 NOTE — PROGRESS NOTES
Assessment & Plan     Chronic bilateral low back pain without sciatica  Longstanding hx of severe chronic low back pain, see previous documentation for details. Has known degenerative changes at essentially all levels. Has trialed numerous medications for assistance (voltaren gel, gabapentin, duloxetine, amitriptyline, nortriptyline, sulindac) essentially all came w/ severe intolerable dizziness. I have discussed w/ See numerous times that we have exhausted what I am able to provide here in clinic. We have recommended she see the pain clinic and/or interventional radiology to discuss possible epidural injections vs other procedural intervention. She has remained quite adamant she does not want any type of injection or procedure done. Discussed strategies for coping her daily symptoms, continue to offer mental health resources as I suspect there is some component  of anxiety/depression heightening her physical symptoms.     Dizziness   Statin declined  Aspirin prophylaxis declined   See has been continuing to experience exertional dizziness when she is up and moving. Symptoms are not position dependent and only come on w/ exertion. I am concerned this represents potential symptomatic CAD and therefore discussed options for evaluation of this with See. She adamantly declines any stress testing, either treadmill or nuclear. Discussed utility of EKG here in clinic, however pt states that if we would not do anything different based on these results she does not want the EKG done today. Does have ACP on file from several years ago stating she is DNR DNI and therefore this does seem appropriate and within GOC.     No follow-ups on file.    Subjective   See is a 79 year old, presenting for the following health issues:  Tailbone Pain (Applying the cream helps but starts again afterwards )    HPI      80 yo F w/ pmh of prediabetes, HLD, GERD, OA   - document recommended medication management for presumed CAD    Care  "coordination / follow up  - Saw me last 2/24 for a number of concerns including chronic low back pain, dizziness, exertional chest pain, and mental health  - Known multilevel degenerative changes throughout the L spine w/ sciatica. Last imaging done was L spine XR last year and CT L spine in '21. 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    > has tried many meds (voltaren gel, gabapentin, duloxetine, amitriptyline, nortriptyline, sulindac) all of which were not tolerated d/t side effects  - Given pain referral for discussion of possible epidural injection. Unable to schedule after multiple attempts to get in touch.  - Exertional dizziness and chest pain. Ordered nuclear med stress test, has not been scheduled yet  - Today: no longer wants epidural injections, will continue using OTC menthol cream and tylenol    Review of Systems  Constitutional, HEENT, cardiovascular, pulmonary, gi and gu systems are negative, except as otherwise noted.        Objective    /72 (BP Location: Right arm, Patient Position: Sitting)   Pulse 75   Temp 98.7  F (37.1  C) (Oral)   Resp 20   Ht 1.4 m (4' 7.12\")   Wt 41.7 kg (92 lb)   SpO2 98%   BMI 21.29 kg/m    Body mass index is 21.29 kg/m .  Physical Exam   GENERAL: alert and no distress  NECK: no adenopathy, no asymmetry, masses, or scars  RESP: lungs clear to auscultation - no rales, rhonchi or wheezes  CV: regular rate and rhythm, normal S1 S2, no S3 or S4, no murmur, click or rub, no peripheral edema  ABDOMEN: soft, nontender, no hepatosplenomegaly, no masses and bowel sounds normal  MS: no gross musculoskeletal defects noted, no edema          Signed Electronically by: Rubin Pelayo MD    "

## 2025-03-24 ENCOUNTER — TRANSFERRED RECORDS (OUTPATIENT)
Dept: HEALTH INFORMATION MANAGEMENT | Facility: CLINIC | Age: 80
End: 2025-03-24

## 2025-05-05 ENCOUNTER — TELEPHONE (OUTPATIENT)
Dept: FAMILY MEDICINE | Facility: CLINIC | Age: 80
End: 2025-05-05
Payer: COMMERCIAL

## 2025-06-03 ENCOUNTER — APPOINTMENT (OUTPATIENT)
Dept: CT IMAGING | Facility: HOSPITAL | Age: 80
End: 2025-06-03
Attending: PHYSICIAN ASSISTANT
Payer: COMMERCIAL

## 2025-06-03 ENCOUNTER — HOSPITAL ENCOUNTER (EMERGENCY)
Facility: HOSPITAL | Age: 80
Discharge: HOME | End: 2025-06-04
Payer: COMMERCIAL

## 2025-06-03 VITALS
RESPIRATION RATE: 16 BRPM | DIASTOLIC BLOOD PRESSURE: 99 MMHG | HEART RATE: 64 BPM | SYSTOLIC BLOOD PRESSURE: 190 MMHG | OXYGEN SATURATION: 98 % | TEMPERATURE: 98.1 F

## 2025-06-03 DIAGNOSIS — N13.2 HYDRONEPHROSIS WITH URINARY OBSTRUCTION DUE TO URETERAL CALCULUS: ICD-10-CM

## 2025-06-03 LAB
ALBUMIN UR-MCNC: 10 MG/DL
ANION GAP SERPL CALCULATED.3IONS-SCNC: 11 MMOL/L (ref 7–15)
APPEARANCE UR: ABNORMAL
BACTERIA #/AREA URNS HPF: ABNORMAL /HPF
BASOPHILS # BLD AUTO: 0.1 10E3/UL (ref 0–0.2)
BASOPHILS NFR BLD AUTO: 1 %
BILIRUB UR QL STRIP: NEGATIVE
BUN SERPL-MCNC: 23.1 MG/DL (ref 8–23)
CALCIUM SERPL-MCNC: 9.2 MG/DL (ref 8.8–10.4)
CHLORIDE SERPL-SCNC: 108 MMOL/L (ref 98–107)
COLOR UR AUTO: ABNORMAL
CREAT SERPL-MCNC: 0.82 MG/DL (ref 0.51–0.95)
CRP SERPL-MCNC: 3.5 MG/L
EGFRCR SERPLBLD CKD-EPI 2021: 73 ML/MIN/1.73M2
EOSINOPHIL # BLD AUTO: 0.2 10E3/UL (ref 0–0.7)
EOSINOPHIL NFR BLD AUTO: 2 %
ERYTHROCYTE [DISTWIDTH] IN BLOOD BY AUTOMATED COUNT: 12.5 % (ref 10–15)
GLUCOSE SERPL-MCNC: 162 MG/DL (ref 70–99)
GLUCOSE UR STRIP-MCNC: NEGATIVE MG/DL
HCO3 SERPL-SCNC: 21 MMOL/L (ref 22–29)
HCT VFR BLD AUTO: 38.7 % (ref 35–47)
HGB BLD-MCNC: 13.2 G/DL (ref 11.7–15.7)
HGB UR QL STRIP: ABNORMAL
HYALINE CASTS: 2 /LPF
IMM GRANULOCYTES # BLD: 0.1 10E3/UL
IMM GRANULOCYTES NFR BLD: 1 %
KETONES UR STRIP-MCNC: NEGATIVE MG/DL
LEUKOCYTE ESTERASE UR QL STRIP: ABNORMAL
LYMPHOCYTES # BLD AUTO: 2.4 10E3/UL (ref 0.8–5.3)
LYMPHOCYTES NFR BLD AUTO: 22 %
MCH RBC QN AUTO: 30.1 PG (ref 26.5–33)
MCHC RBC AUTO-ENTMCNC: 34.1 G/DL (ref 31.5–36.5)
MCV RBC AUTO: 88 FL (ref 78–100)
MONOCYTES # BLD AUTO: 1 10E3/UL (ref 0–1.3)
MONOCYTES NFR BLD AUTO: 9 %
MUCOUS THREADS #/AREA URNS LPF: PRESENT /LPF
NEUTROPHILS # BLD AUTO: 7.5 10E3/UL (ref 1.6–8.3)
NEUTROPHILS NFR BLD AUTO: 66 %
NITRATE UR QL: NEGATIVE
NRBC # BLD AUTO: 0 10E3/UL
NRBC BLD AUTO-RTO: 0 /100
PH UR STRIP: 5.5 [PH] (ref 5–7)
PLATELET # BLD AUTO: 236 10E3/UL (ref 150–450)
POTASSIUM SERPL-SCNC: 3.7 MMOL/L (ref 3.4–5.3)
RBC # BLD AUTO: 4.38 10E6/UL (ref 3.8–5.2)
RBC URINE: >182 /HPF
SODIUM SERPL-SCNC: 140 MMOL/L (ref 135–145)
SP GR UR STRIP: 1.02 (ref 1–1.03)
SQUAMOUS EPITHELIAL: <1 /HPF
UROBILINOGEN UR STRIP-MCNC: NORMAL MG/DL
WBC # BLD AUTO: 11.3 10E3/UL (ref 4–11)
WBC URINE: 11 /HPF

## 2025-06-03 PROCEDURE — 258N000003 HC RX IP 258 OP 636: Performed by: EMERGENCY MEDICINE

## 2025-06-03 PROCEDURE — 86140 C-REACTIVE PROTEIN: CPT | Performed by: PHYSICIAN ASSISTANT

## 2025-06-03 PROCEDURE — 96361 HYDRATE IV INFUSION ADD-ON: CPT

## 2025-06-03 PROCEDURE — 99285 EMERGENCY DEPT VISIT HI MDM: CPT | Mod: 25

## 2025-06-03 PROCEDURE — 250N000011 HC RX IP 250 OP 636: Mod: JZ | Performed by: PHYSICIAN ASSISTANT

## 2025-06-03 PROCEDURE — 96374 THER/PROPH/DIAG INJ IV PUSH: CPT

## 2025-06-03 PROCEDURE — 85041 AUTOMATED RBC COUNT: CPT | Performed by: EMERGENCY MEDICINE

## 2025-06-03 PROCEDURE — 82435 ASSAY OF BLOOD CHLORIDE: CPT | Performed by: EMERGENCY MEDICINE

## 2025-06-03 PROCEDURE — 36415 COLL VENOUS BLD VENIPUNCTURE: CPT | Performed by: EMERGENCY MEDICINE

## 2025-06-03 PROCEDURE — 96375 TX/PRO/DX INJ NEW DRUG ADDON: CPT

## 2025-06-03 PROCEDURE — 74176 CT ABD & PELVIS W/O CONTRAST: CPT

## 2025-06-03 PROCEDURE — 250N000011 HC RX IP 250 OP 636: Mod: JZ | Performed by: EMERGENCY MEDICINE

## 2025-06-03 PROCEDURE — 87086 URINE CULTURE/COLONY COUNT: CPT | Performed by: EMERGENCY MEDICINE

## 2025-06-03 PROCEDURE — 85025 COMPLETE CBC W/AUTO DIFF WBC: CPT | Performed by: EMERGENCY MEDICINE

## 2025-06-03 PROCEDURE — 82310 ASSAY OF CALCIUM: CPT | Performed by: EMERGENCY MEDICINE

## 2025-06-03 PROCEDURE — 250N000013 HC RX MED GY IP 250 OP 250 PS 637: Performed by: PHYSICIAN ASSISTANT

## 2025-06-03 PROCEDURE — 81001 URINALYSIS AUTO W/SCOPE: CPT | Performed by: EMERGENCY MEDICINE

## 2025-06-03 RX ORDER — TAMSULOSIN HYDROCHLORIDE 0.4 MG/1
0.4 CAPSULE ORAL ONCE
Status: COMPLETED | OUTPATIENT
Start: 2025-06-03 | End: 2025-06-03

## 2025-06-03 RX ORDER — KETOROLAC TROMETHAMINE 15 MG/ML
15 INJECTION, SOLUTION INTRAMUSCULAR; INTRAVENOUS ONCE
Status: COMPLETED | OUTPATIENT
Start: 2025-06-03 | End: 2025-06-03

## 2025-06-03 RX ADMIN — KETOROLAC TROMETHAMINE 15 MG: 15 INJECTION, SOLUTION INTRAMUSCULAR; INTRAVENOUS at 20:19

## 2025-06-03 RX ADMIN — TAMSULOSIN HYDROCHLORIDE 0.4 MG: 0.4 CAPSULE ORAL at 23:20

## 2025-06-03 RX ADMIN — HYDROMORPHONE HYDROCHLORIDE 1 MG: 1 INJECTION, SOLUTION INTRAMUSCULAR; INTRAVENOUS; SUBCUTANEOUS at 23:20

## 2025-06-03 RX ADMIN — SODIUM CHLORIDE 500 ML: 0.9 INJECTION, SOLUTION INTRAVENOUS at 20:17

## 2025-06-03 ASSESSMENT — COLUMBIA-SUICIDE SEVERITY RATING SCALE - C-SSRS
1. IN THE PAST MONTH, HAVE YOU WISHED YOU WERE DEAD OR WISHED YOU COULD GO TO SLEEP AND NOT WAKE UP?: NO
6. HAVE YOU EVER DONE ANYTHING, STARTED TO DO ANYTHING, OR PREPARED TO DO ANYTHING TO END YOUR LIFE?: NO
2. HAVE YOU ACTUALLY HAD ANY THOUGHTS OF KILLING YOURSELF IN THE PAST MONTH?: NO

## 2025-06-03 ASSESSMENT — ACTIVITIES OF DAILY LIVING (ADL): ADLS_ACUITY_SCORE: 41

## 2025-06-04 RX ORDER — TAMSULOSIN HYDROCHLORIDE 0.4 MG/1
0.4 CAPSULE ORAL DAILY
Qty: 14 CAPSULE | Refills: 0 | Status: SHIPPED | OUTPATIENT
Start: 2025-06-04 | End: 2025-06-18

## 2025-06-04 RX ORDER — OXYCODONE AND ACETAMINOPHEN 5; 325 MG/1; MG/1
1 TABLET ORAL EVERY 6 HOURS PRN
Qty: 10 TABLET | Refills: 0 | Status: SHIPPED | OUTPATIENT
Start: 2025-06-04

## 2025-06-04 RX ORDER — ONDANSETRON 4 MG/1
4 TABLET, ORALLY DISINTEGRATING ORAL EVERY 8 HOURS PRN
Qty: 10 TABLET | Refills: 0 | Status: SHIPPED | OUTPATIENT
Start: 2025-06-04 | End: 2025-06-07

## 2025-06-04 NOTE — ED TRIAGE NOTES
The patient comes from her apartment by ems. She has a hx of kidney stones and called 911 today due to increased back pain and left flank pain. She also reports painful urination with blood. This started at 1600 today. She was able to walk to the stretcher for ems.
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
## 2025-06-04 NOTE — ED NOTES
Bed: JNEDH-D  Expected date: 6/3/25  Expected time: 10:37 PM  Means of arrival: Ambulance  Comments:

## 2025-06-04 NOTE — DISCHARGE INSTRUCTIONS
As we discussed, you have a large kidney stone which is the source of your pain.  This will not pass on its own and will need intervention.  I have placed an emergent referral to the kidney stone [place] and provided their phone number above.  Please call them tomorrow to schedule close follow-up before the weekend.  Please take regularly dosed ibuprofen, and I will start you on daily Flomax to help with ureter spasms and send you home with Percocet which is oxycodone with Tylenol for breakthrough pain.  Percocet is a strong pain medicine and can make you drowsy.  Please do not take this working, driving, or operating heavy machinery.  If it anytime you develop fevers or chills, uncontrolled nausea, vomiting or pain, burning when you urinate please return to the ER for further evaluation.    Your blood pressure was elevated in the emergencydepartment today and requires recheck and close follow-up in your primary care clinic. Untreated blood pressure can cause serious complications including, but not limited to stroke, heart attack/failure, and kidney disease.  Please make a close follow-up appointment to have this recheck performed. Please return to the emergency department immediately if you develop a severe headache, vision changes, chest pain, shortness of breath, orabdominal pain.

## 2025-06-05 ENCOUNTER — APPOINTMENT (OUTPATIENT)
Dept: INTERPRETER SERVICES | Facility: CLINIC | Age: 80
End: 2025-06-05
Payer: COMMERCIAL

## 2025-06-05 ENCOUNTER — RESULTS FOLLOW-UP (OUTPATIENT)
Dept: NURSING | Facility: CLINIC | Age: 80
End: 2025-06-05

## 2025-06-05 LAB — BACTERIA UR CULT: NORMAL

## 2025-06-10 ENCOUNTER — TELEPHONE (OUTPATIENT)
Dept: UROLOGY | Facility: CLINIC | Age: 80
End: 2025-06-10

## 2025-06-10 NOTE — TELEPHONE ENCOUNTER
[facility] Call Center    Phone Message    May a detailed message be left on voicemail: yes     Reason for Call: Other: Carter Metz called at 11:05am upset because his mom and family have been on hold (or waiting?) since 9:30 for the VV this morning. Writer called [facility] but we couldn't determine exactly what went wrong. Please call carter Metz back asa to discuss/schedule. Thank you!      Action Taken: Message routed to:  [facility] ([facility] Rodriguez BERNSTEIN    Travel Screening: Not Applicable     Date of Service:

## 2025-06-10 NOTE — TELEPHONE ENCOUNTER
Left message with son Isaías and GLENNA Montes to assist, direct number for nurse given.  Leilani Mcclellan RN

## 2025-06-10 NOTE — TELEPHONE ENCOUNTER
Patient needs to be rescheduled for their virtual visit due to Reason for Reschedule: No-Show    Appointment mode: Video  Provider: Janeth APONTE CNP

## 2025-06-11 ENCOUNTER — VIRTUAL VISIT (OUTPATIENT)
Dept: UROLOGY | Facility: CLINIC | Age: 80
End: 2025-06-11
Attending: PHYSICIAN ASSISTANT
Payer: COMMERCIAL

## 2025-06-11 DIAGNOSIS — N20.1 CALCULUS OF DISTAL LEFT URETER: Primary | ICD-10-CM

## 2025-06-11 DIAGNOSIS — N20.0 URIC ACID NEPHROLITHIASIS: ICD-10-CM

## 2025-06-11 NOTE — PATIENT INSTRUCTIONS
UROLOGY CLINIC VISIT PATIENT INSTRUCTIONS    It was a pleasure seeing you today! Thank you for giving us the opportunity to care for you. We hope we provided the excellent service you deserve and look forward to serving you again.    Instructions per today's visit: If having severe flank pain, fevers, chills, nausea, or vomiting please notify Urology clinic or be seen in the ER.     If you have any issues, questions, or concerns, please don't hesitate to contact us at [facility] at [phone] or via [employer].    Important Contact Information:  -To each our nurse triage line, please call our contact center at [phone].  -Our clinic hours are Monday through Friday, 8:00 a.m. - 4:30 p.m. Feel free to call during these hours with any questions.  -You can also contact us anytime via [employer], and we will respond during clinic hours.      Janeth Cantu, CNP  Department of Urology    
[employer]
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
## 2025-06-11 NOTE — NURSING NOTE
Current patient location: [address]  [place] [address]    Is the patient currently in the state of [place]? YES    Visit mode: VIDEO    If the visit is dropped, the patient can be reconnected by:VIDEO VISIT: Text to cell phone:   Telephone Information:   Mobile [phone]   Mobile [phone]       Will anyone else be joining the visit? NO  (If patient encounters technical issues they should call [phone]    Are changes needed to the allergy or medication list? Patients daughter stated no changes, and taking Tylenol for pain.     Are refills needed on medications prescribed by this physician? NO    Rooming Documentation:  Not applicable    Reason for visit: Consult    Kendra JEAN-BAPTISTE

## 2025-06-16 ENCOUNTER — TELEPHONE (OUTPATIENT)
Dept: UROLOGY | Facility: CLINIC | Age: 80
End: 2025-06-16

## 2025-06-19 ENCOUNTER — TELEPHONE (OUTPATIENT)
Dept: UROLOGY | Facility: CLINIC | Age: 80
End: 2025-06-19
Payer: COMMERCIAL

## 2025-06-20 PROBLEM — N20.1 CALCULUS OF DISTAL LEFT URETER: Status: ACTIVE | Noted: 2025-06-11

## 2025-06-24 ENCOUNTER — TELEPHONE (OUTPATIENT)
Dept: UROLOGY | Facility: CLINIC | Age: 80
End: 2025-06-24
Payer: COMMERCIAL

## 2025-06-24 NOTE — TELEPHONE ENCOUNTER
Left message for pt's son (primary contact) to call RN regarding upcoming surgery with Dr. Guerrero. Provided RN direct number for follow up.     Needs pre-op.    ALEJANDRA Mccoy  Care Coordinator- Urology   [phone]

## 2025-07-03 ENCOUNTER — TELEPHONE (OUTPATIENT)
Dept: UROLOGY | Facility: CLINIC | Age: 80
End: 2025-07-03
Payer: COMMERCIAL

## 2025-07-03 NOTE — TELEPHONE ENCOUNTER
Message left for patient to call regarding large ureteral stone.  Would like to discuss provider's care plan for treating the large stone.  Attempt [phone] on [phone] with ringing then busy signal.  Will try to reach patient again on Monday.

## 2025-08-13 ENCOUNTER — OFFICE VISIT (OUTPATIENT)
Dept: FAMILY MEDICINE | Facility: CLINIC | Age: 80
End: 2025-08-13
Payer: COMMERCIAL

## 2025-08-13 VITALS
HEART RATE: 70 BPM | BODY MASS INDEX: 19.67 KG/M2 | RESPIRATION RATE: 18 BRPM | TEMPERATURE: 97.7 F | OXYGEN SATURATION: 96 % | HEIGHT: 55 IN | SYSTOLIC BLOOD PRESSURE: 122 MMHG | DIASTOLIC BLOOD PRESSURE: 72 MMHG | WEIGHT: 85 LBS

## 2025-08-13 DIAGNOSIS — R42 DIZZINESS: Primary | ICD-10-CM

## 2025-08-13 DIAGNOSIS — N20.1 URETERAL STONE: ICD-10-CM

## 2025-08-13 DIAGNOSIS — R53.1 GENERALIZED WEAKNESS: ICD-10-CM

## 2025-08-13 DIAGNOSIS — D49.2 SKIN GROWTH: ICD-10-CM

## 2025-08-13 LAB
ALBUMIN UR-MCNC: 30 MG/DL
APPEARANCE UR: CLEAR
BACTERIA #/AREA URNS HPF: ABNORMAL /HPF
BILIRUB UR QL STRIP: NEGATIVE
COLOR UR AUTO: YELLOW
ERYTHROCYTE [DISTWIDTH] IN BLOOD BY AUTOMATED COUNT: 13.3 % (ref 10–15)
GLUCOSE UR STRIP-MCNC: NEGATIVE MG/DL
HCT VFR BLD AUTO: 39.4 % (ref 35–47)
HGB BLD-MCNC: 12.6 G/DL (ref 11.7–15.7)
HGB UR QL STRIP: ABNORMAL
KETONES UR STRIP-MCNC: NEGATIVE MG/DL
LEUKOCYTE ESTERASE UR QL STRIP: ABNORMAL
MCH RBC QN AUTO: 29.3 PG (ref 26.5–33)
MCHC RBC AUTO-ENTMCNC: 32 G/DL (ref 31.5–36.5)
MCV RBC AUTO: 91.6 FL (ref 78–100)
NITRATE UR QL: NEGATIVE
PH UR STRIP: 5.5 [PH] (ref 5–7)
PLATELET # BLD AUTO: 220 10E3/UL (ref 150–450)
RBC # BLD AUTO: 4.3 10E6/UL (ref 3.8–5.2)
RBC #/AREA URNS AUTO: ABNORMAL /HPF
SP GR UR STRIP: 1.02 (ref 1–1.03)
SQUAMOUS #/AREA URNS AUTO: ABNORMAL /LPF
UROBILINOGEN UR STRIP-ACNC: 0.2 E.U./DL
WBC # BLD AUTO: 8.15 10E3/UL (ref 4–11)
WBC #/AREA URNS AUTO: ABNORMAL /HPF

## 2025-08-13 PROCEDURE — 80048 BASIC METABOLIC PNL TOTAL CA: CPT

## 2025-08-13 PROCEDURE — 99214 OFFICE O/P EST MOD 30 MIN: CPT | Mod: GC

## 2025-08-13 PROCEDURE — 3074F SYST BP LT 130 MM HG: CPT

## 2025-08-13 PROCEDURE — 36415 COLL VENOUS BLD VENIPUNCTURE: CPT

## 2025-08-13 PROCEDURE — 85027 COMPLETE CBC AUTOMATED: CPT

## 2025-08-13 PROCEDURE — 81001 URINALYSIS AUTO W/SCOPE: CPT

## 2025-08-13 PROCEDURE — G2211 COMPLEX E/M VISIT ADD ON: HCPCS

## 2025-08-13 PROCEDURE — 3078F DIAST BP <80 MM HG: CPT

## 2025-08-13 ASSESSMENT — PATIENT HEALTH QUESTIONNAIRE - PHQ9
SUM OF ALL RESPONSES TO PHQ QUESTIONS 1-9: 7
SUM OF ALL RESPONSES TO PHQ QUESTIONS 1-9: 7
10. IF YOU CHECKED OFF ANY PROBLEMS, HOW DIFFICULT HAVE THESE PROBLEMS MADE IT FOR YOU TO DO YOUR WORK, TAKE CARE OF THINGS AT HOME, OR GET ALONG WITH OTHER PEOPLE: NOT DIFFICULT AT ALL

## 2025-08-14 LAB
ANION GAP SERPL CALCULATED.3IONS-SCNC: 9 MMOL/L (ref 7–15)
BUN SERPL-MCNC: 23.6 MG/DL (ref 8–23)
CALCIUM SERPL-MCNC: 9.5 MG/DL (ref 8.8–10.4)
CHLORIDE SERPL-SCNC: 106 MMOL/L (ref 98–107)
CREAT SERPL-MCNC: 0.76 MG/DL (ref 0.51–0.95)
EGFRCR SERPLBLD CKD-EPI 2021: 80 ML/MIN/1.73M2
GLUCOSE SERPL-MCNC: 117 MG/DL (ref 70–99)
HCO3 SERPL-SCNC: 24 MMOL/L (ref 22–29)
POTASSIUM SERPL-SCNC: 3.6 MMOL/L (ref 3.4–5.3)
SODIUM SERPL-SCNC: 139 MMOL/L (ref 135–145)